# Patient Record
Sex: FEMALE | Race: WHITE | Employment: OTHER | URBAN - METROPOLITAN AREA
[De-identification: names, ages, dates, MRNs, and addresses within clinical notes are randomized per-mention and may not be internally consistent; named-entity substitution may affect disease eponyms.]

---

## 2018-11-26 ENCOUNTER — HOSPITAL ENCOUNTER (OUTPATIENT)
Dept: WOMENS IMAGING | Age: 63
Discharge: HOME | End: 2018-11-28
Payer: MEDICARE

## 2018-11-26 DIAGNOSIS — Z12.31 ENCOUNTER FOR SCREENING MAMMOGRAM FOR BREAST CANCER: ICD-10-CM

## 2018-11-26 PROCEDURE — 77067 SCR MAMMO BI INCL CAD: CPT

## 2020-03-19 ENCOUNTER — HOSPITAL ENCOUNTER (OUTPATIENT)
Dept: WOMENS IMAGING | Age: 65
Discharge: HOME | End: 2020-03-21
Payer: MEDICARE

## 2020-03-19 PROCEDURE — 77063 BREAST TOMOSYNTHESIS BI: CPT

## 2020-10-16 ENCOUNTER — HOSPITAL ENCOUNTER (OUTPATIENT)
Dept: GENERAL RADIOLOGY | Age: 65
Discharge: HOME | End: 2020-10-18
Payer: MEDICARE

## 2020-10-16 PROCEDURE — 72050 X-RAY EXAM NECK SPINE 4/5VWS: CPT

## 2021-08-10 ENCOUNTER — HOSPITAL ENCOUNTER (OUTPATIENT)
Dept: WOMENS IMAGING | Age: 66
Discharge: HOME | End: 2021-08-12
Payer: MEDICARE

## 2021-08-10 DIAGNOSIS — Z78.0 MENOPAUSE: ICD-10-CM

## 2021-08-10 DIAGNOSIS — Z12.31 ENCOUNTER FOR SCREENING MAMMOGRAM FOR BREAST CANCER: ICD-10-CM

## 2021-08-10 DIAGNOSIS — Z13.820 SCREENING FOR OSTEOPOROSIS: ICD-10-CM

## 2021-08-10 PROCEDURE — 77067 SCR MAMMO BI INCL CAD: CPT

## 2021-08-10 PROCEDURE — 77080 DXA BONE DENSITY AXIAL: CPT

## 2022-09-14 ENCOUNTER — HOSPITAL ENCOUNTER (OUTPATIENT)
Dept: GENERAL RADIOLOGY | Age: 67
Discharge: HOME | End: 2022-09-16
Payer: MEDICARE

## 2022-09-14 DIAGNOSIS — M54.6 THORACIC SPINE PAIN: ICD-10-CM

## 2022-09-14 DIAGNOSIS — M54.2 NECK PAIN: ICD-10-CM

## 2022-09-14 PROCEDURE — 72050 X-RAY EXAM NECK SPINE 4/5VWS: CPT

## 2022-09-14 PROCEDURE — 72072 X-RAY EXAM THORAC SPINE 3VWS: CPT

## 2023-03-16 ENCOUNTER — INITIAL CONSULT (OUTPATIENT)
Dept: PAIN MANAGEMENT | Age: 68
End: 2023-03-16
Payer: MEDICARE

## 2023-03-16 VITALS
TEMPERATURE: 96.9 F | DIASTOLIC BLOOD PRESSURE: 84 MMHG | HEIGHT: 65 IN | SYSTOLIC BLOOD PRESSURE: 132 MMHG | WEIGHT: 239 LBS | BODY MASS INDEX: 39.82 KG/M2

## 2023-03-16 DIAGNOSIS — M54.12 CERVICAL RADICULOPATHY: Primary | ICD-10-CM

## 2023-03-16 PROCEDURE — 99203 OFFICE O/P NEW LOW 30 MIN: CPT | Performed by: PAIN MEDICINE

## 2023-03-16 PROCEDURE — 1123F ACP DISCUSS/DSCN MKR DOCD: CPT | Performed by: PAIN MEDICINE

## 2023-03-16 NOTE — PROGRESS NOTES
[facility]) Physicians  Neurosurgery and Pain [facility]  [address] [facility], [address] [facility] , [address], [place]: [phone]  F: [phone]        Crescencio Liriano  ([birth year])    3/16/2023    Subjective:     Crescencio Liriano is 78 y.o. female who complains today of:     Chief Complaint   Patient presents with    Neck Pain    Back Pain    Consultation     Patient here today for initial evaluation. She saw Izabel Jarquin physician assistant for Dr. Gunner Maldonado. Currently she is not a surgical candidate. Her MRI from January of this year shows mild multilevel cervical spondylosis most pronounced at C5-6 C6-7. She gets neck pain and sometimes pain down the arms with numbness tingling. She is on Plavix aspirin. No neck surgery. She is diabetic. Currently does not work to physical jobs. Had physical therapy. She quit smoking in 2006. Turning twisting bothers her. The pain can be achy or sharp and varies. Plan: She has failed conservative treatment. We will put in for a cervical epidural to help her neck pain and radicular pain. We will try to get permission from her primary care provider to be off Plavix and aspirin 1 week prior to procedure. She apparently was put on Plavix many years ago by her neurologist.  She currently does not have a neurologist.  She also had a heart attack in 2006 that is when she quit smoking. I showed her an anatomic model pathology. She understands the plan is in agreement. Questions answered chart was reviewed. Allergies:  Patient has no allergy information on record. No past medical history on file. No past surgical history on file. No family history on file.   Social History     Socioeconomic History    Marital status:      Spouse name: Not on file    Number of children: Not on file    Years of education: Not on file    Highest education level: Not on file   Occupational History    Not on file   Tobacco Use    Smoking status: Not on file    Smokeless tobacco: Not on file   Substance and Sexual Activity    Alcohol use: Not on file    Drug use: Not on file    Sexual activity: Not on file   Other Topics Concern    Not on file   Social History Narrative    Not on file     Social Determinants of Health     Financial Resource Strain: Not on file   Food Insecurity: Not on file   Transportation Needs: Not on file   Physical Activity: Not on file   Stress: Not on file   Social Connections: Not on file   Intimate Partner Violence: Not on file   Housing Stability: Not on file       No current outpatient medications on file prior to visit. No current facility-administered medications on file prior to visit. HPI    Review of Systems   All other systems reviewed and are negative. Objective:     Vitals:  /84 (Site: Left Upper Arm)   Temp 96.9 °F (36.1 °C) (Temporal)   Ht 5' 5\" (1.651 m)   Wt 239 lb (108.4 kg)   BMI 39.77 kg/m² Pain Score:  10 - Worst pain ever      Physical Exam  Patient is AO X 3 , recent and remote memory is intact,mood and affect normal,judgement and insight normal. Head normacephalic,eyes - PERRLA, EOMI, No obvious external Ears, Nose, mouth masses seen, neck supple, trachae midline, no abnormal lymph nodes palpated in neck or supraclavicular region. CN's 2-12 grossly intact. Strength functional for ambulation, balance functional, coordination normal. Visualized skin intact, no obvious lesion or visualized cyanosis. No swelling. Pulses intact. No obvious upper motor neuron signs. Sensation grossly intact to light touch. Strength functional upper extremities. Positive Spurling's maneuver decreased range of motion cervical spine      Assessment:      Diagnosis Orders   1.  Cervical radiculopathy  MO NJX DX/THER SBST INTRLMNR CRV/THRC W/IMG GDN          Plan:

## 2023-03-24 ENCOUNTER — TELEPHONE (OUTPATIENT)
Dept: PAIN MANAGEMENT | Age: 68
End: 2023-03-24

## 2023-03-24 NOTE — TELEPHONE ENCOUNTER
Received permission from Dr Thierry Serrano to hold Plavx and ASA for  7 days prior to cervical epidural. Waiting for insurance approval. Order printed and put in precert bin.

## 2023-03-29 ENCOUNTER — TELEPHONE (OUTPATIENT)
Dept: PAIN MANAGEMENT | Age: 68
End: 2023-03-29

## 2023-03-29 NOTE — TELEPHONE ENCOUNTER
PLEASE ASK DR. Stacy Oneal WHO HE WOULD LIKE TO PERFORM THIS CERVICAL PRIYANKA? DR Naveed Landin?     WE NEED TO KNOW WHICH DR TO  SUBMIT FOR AUTHORIZATION

## 2023-03-30 ENCOUNTER — TELEPHONE (OUTPATIENT)
Dept: PAIN MANAGEMENT | Age: 68
End: 2023-03-30

## 2023-03-30 NOTE — TELEPHONE ENCOUNTER
REFERRAL NUMBER [id number]    EDILMA    AUTH FROM 4/4/23-9/26/23    OK to schedule procedure approved as above. Please note sides/levels approved and date range. (If applicable, sides/levels approved may differ from those ordered)    TO BE SCHEDULED WITH DR. Delacruz Running

## 2023-03-30 NOTE — TELEPHONE ENCOUNTER
(SM- CERVICAL PRIYANKA AUTH -- 4/4/23-9/26/23)    Patient is on Plavix and ASA , request send to pcp Dr aPntera Tillman, request send on 3/24 ok to schedule.  Patient is schedule

## 2023-05-04 ENCOUNTER — PROCEDURE VISIT (OUTPATIENT)
Dept: PAIN MANAGEMENT | Age: 68
End: 2023-05-04

## 2023-05-04 DIAGNOSIS — M54.12 CERVICAL RADICULOPATHY: Primary | ICD-10-CM

## 2023-05-04 RX ORDER — LIDOCAINE HYDROCHLORIDE 10 MG/ML
5 INJECTION, SOLUTION INFILTRATION; PERINEURAL ONCE
Status: COMPLETED | OUTPATIENT
Start: 2023-05-04 | End: 2023-05-04

## 2023-05-04 RX ORDER — DEXAMETHASONE SODIUM PHOSPHATE 10 MG/ML
10 INJECTION, SOLUTION INTRAMUSCULAR; INTRAVENOUS ONCE
Status: COMPLETED | OUTPATIENT
Start: 2023-05-04 | End: 2023-05-04

## 2023-05-04 RX ORDER — SODIUM CHLORIDE 9 MG/ML
3 INJECTION INTRAVENOUS ONCE
Status: COMPLETED | OUTPATIENT
Start: 2023-05-04 | End: 2023-05-04

## 2023-05-04 RX ADMIN — Medication 0.5 MEQ: at 11:43

## 2023-05-04 RX ADMIN — SODIUM CHLORIDE 3 ML: 9 INJECTION INTRAVENOUS at 11:44

## 2023-05-04 RX ADMIN — LIDOCAINE HYDROCHLORIDE 5 ML: 10 INJECTION, SOLUTION INFILTRATION; PERINEURAL at 11:43

## 2023-05-04 RX ADMIN — DEXAMETHASONE SODIUM PHOSPHATE 10 MG: 10 INJECTION, SOLUTION INTRAMUSCULAR; INTRAVENOUS at 11:42

## 2023-05-04 NOTE — PROGRESS NOTES
Cervical Interlaminar Epidural Corticosteroid Injection (ILESI) under Fluoroscopic Guidance         Patient Name: Dimas Ramirez  :   Date: 2023   Provider: Francois Segura MD    PROCEDURE:  Cervical interlaminar epidural corticosteroid injection (ILESI) at the C7/T1 level under fluoroscopic guidance    INDICATIONS: Dimas Ramirez is a 78 y.o. female who presents with symptoms and physical exam findings consistent with cervical radiculopathy. She has cervical paresthesias with a positive Spurling's maneuver on physical exam. She has had persistent pain that limits her activities of daily living such as upper body dressing. The pain is persistent despite conservative measures including home exercise program. She is unable to take anti-inflammatories due to comorbidities. Given her symptoms, physical exam findings, impairment in activities of daily living, and lack of response to conservative measures, consideration for C7/T1 Cervical interlaminar epidural corticosteroid injection under fluoroscopic guidance was given. Discussed the risks including but not limited to bleeding, infection, worsened pain, damage to surrounding structures, side effects, toxicity, allergic reactions to medications used, need for surgery, headache, vision changes, difficulty with chewing and/or swallowing, pneumothorax, immune and stress-response dysfunction, fat necrosis, avascular necrosis, skin pigmentation changes, blood sugar elevation, as well as catastrophic injury such as vision loss/blindness, paralysis, spinal cord or plexus injury, cerebral brainstem or spinal cord infarction, intrathecal injection, spinal cord puncture, persistent dural leak, arachnoiditis, stroke, bowel/bladder/sexual dysfunction, ventilator dependence, loss of use of the arms and/or legs, and death. Discussed off-label use of corticosteroids and how the Food and Drug Administration (FDA) has not approved corticosteroids for epidural use.

## 2023-05-04 NOTE — PROGRESS NOTES
This procedure was 50% more difficult and required 50% more work secondary to the patient's habitus. The patient has a BMI of 39.8 and has comorbidities of diabetes mellitus and osteoarthritis. This required increased work for safe and proper positioning upon the fluoroscopy table, increased needle passes for safe and appropriate needle placement, and increased fluoroscopy time and radiation exposure for proper visualization.

## 2023-05-26 LAB
BACTERIA, URINE: ABNORMAL /HPF
BUDDING YEAST, URINE: PRESENT /HPF
HYALINE CASTS, URINE: ABNORMAL /LPF
RBC, URINE: 10 /HPF (ref 0–5)
SQUAMOUS EPITHELIAL CELLS, URINE: 3 /HPF
WBC CLUMPS, URINE: ABNORMAL /HPF
WBC, URINE: 36 /HPF (ref 0–5)

## 2023-05-27 LAB — URINE CULTURE: NORMAL

## 2023-06-07 ENCOUNTER — OFFICE VISIT (OUTPATIENT)
Dept: PAIN MANAGEMENT | Age: 68
End: 2023-06-07
Payer: MEDICARE

## 2023-06-07 VITALS
SYSTOLIC BLOOD PRESSURE: 126 MMHG | BODY MASS INDEX: 39.82 KG/M2 | DIASTOLIC BLOOD PRESSURE: 74 MMHG | HEIGHT: 65 IN | TEMPERATURE: 96.4 F | WEIGHT: 239 LBS

## 2023-06-07 DIAGNOSIS — M25.561 CHRONIC PAIN OF BOTH KNEES: Primary | ICD-10-CM

## 2023-06-07 DIAGNOSIS — M25.552 BILATERAL HIP PAIN: ICD-10-CM

## 2023-06-07 DIAGNOSIS — G89.29 CHRONIC PAIN OF BOTH KNEES: Primary | ICD-10-CM

## 2023-06-07 DIAGNOSIS — M25.551 BILATERAL HIP PAIN: ICD-10-CM

## 2023-06-07 DIAGNOSIS — M54.12 CERVICAL RADICULOPATHY: ICD-10-CM

## 2023-06-07 DIAGNOSIS — M25.562 CHRONIC PAIN OF BOTH KNEES: Primary | ICD-10-CM

## 2023-06-07 PROCEDURE — 1123F ACP DISCUSS/DSCN MKR DOCD: CPT | Performed by: NURSE PRACTITIONER

## 2023-06-07 PROCEDURE — 99214 OFFICE O/P EST MOD 30 MIN: CPT | Performed by: NURSE PRACTITIONER

## 2023-06-07 RX ORDER — CLOPIDOGREL BISULFATE 75 MG/1
75 TABLET ORAL DAILY
COMMUNITY
Start: 2023-03-31

## 2023-06-07 RX ORDER — OMEPRAZOLE MAGNESIUM 10 MG/1
GRANULE, DELAYED RELEASE ORAL
COMMUNITY

## 2023-06-07 RX ORDER — FLUTICASONE PROPIONATE AND SALMETEROL 500; 50 UG/1; UG/1
1 POWDER RESPIRATORY (INHALATION) 2 TIMES DAILY
COMMUNITY
Start: 2022-05-13

## 2023-06-07 RX ORDER — CLOPIDOGREL BISULFATE 75 MG/1
TABLET ORAL
COMMUNITY
Start: 2022-09-24

## 2023-06-07 RX ORDER — GLIMEPIRIDE 2 MG/1
TABLET ORAL
COMMUNITY
Start: 2023-04-15

## 2023-06-07 RX ORDER — B-COMPLEX WITH VITAMIN C
TABLET ORAL
COMMUNITY

## 2023-06-07 RX ORDER — CALCIUM CARBONATE/VITAMIN D3 600 MG-10
TABLET ORAL
COMMUNITY

## 2023-06-07 RX ORDER — ALLOPURINOL 100 MG/1
TABLET ORAL
COMMUNITY
Start: 2023-06-05

## 2023-06-07 RX ORDER — ALPRAZOLAM 0.5 MG/1
TABLET ORAL
COMMUNITY
Start: 2023-03-10

## 2023-06-07 RX ORDER — DICYCLOMINE HCL 20 MG
TABLET ORAL
COMMUNITY
Start: 2020-12-15

## 2023-06-07 RX ORDER — GABAPENTIN 300 MG/1
CAPSULE ORAL
COMMUNITY
Start: 2023-05-16

## 2023-06-07 RX ORDER — LEVOFLOXACIN 500 MG/1
500 TABLET, FILM COATED ORAL DAILY
COMMUNITY
Start: 2023-05-25

## 2023-06-07 ASSESSMENT — ENCOUNTER SYMPTOMS
DIARRHEA: 0
RESPIRATORY NEGATIVE: 1
CONSTIPATION: 0
BACK PAIN: 0

## 2023-06-08 ENCOUNTER — HOSPITAL ENCOUNTER (OUTPATIENT)
Dept: GENERAL RADIOLOGY | Age: 68
Discharge: HOME | End: 2023-06-10
Payer: MEDICARE

## 2023-06-08 DIAGNOSIS — M25.561 CHRONIC PAIN OF BOTH KNEES: ICD-10-CM

## 2023-06-08 DIAGNOSIS — M25.562 CHRONIC PAIN OF BOTH KNEES: ICD-10-CM

## 2023-06-08 DIAGNOSIS — G89.29 CHRONIC PAIN OF BOTH KNEES: ICD-10-CM

## 2023-06-08 DIAGNOSIS — M25.552 BILATERAL HIP PAIN: ICD-10-CM

## 2023-06-08 DIAGNOSIS — M25.551 BILATERAL HIP PAIN: ICD-10-CM

## 2023-06-08 PROCEDURE — 73502 X-RAY EXAM HIP UNI 2-3 VIEWS: CPT

## 2023-06-08 PROCEDURE — 73560 X-RAY EXAM OF KNEE 1 OR 2: CPT

## 2023-06-09 ENCOUNTER — INITIAL CONSULT (OUTPATIENT)
Dept: SPORTS MEDICINE | Age: 68
End: 2023-06-09

## 2023-06-09 VITALS
HEIGHT: 65 IN | WEIGHT: 234 LBS | SYSTOLIC BLOOD PRESSURE: 128 MMHG | DIASTOLIC BLOOD PRESSURE: 82 MMHG | TEMPERATURE: 97.8 F | BODY MASS INDEX: 38.99 KG/M2

## 2023-06-09 DIAGNOSIS — M25.859 FEMORAL ACETABULAR IMPINGEMENT: ICD-10-CM

## 2023-06-09 DIAGNOSIS — M17.0 PRIMARY OSTEOARTHRITIS OF BOTH KNEES: Primary | ICD-10-CM

## 2023-06-09 RX ORDER — LIDOCAINE HYDROCHLORIDE 10 MG/ML
6 INJECTION, SOLUTION INFILTRATION; PERINEURAL ONCE
Status: COMPLETED | OUTPATIENT
Start: 2023-06-09 | End: 2023-06-09

## 2023-06-09 RX ORDER — BETAMETHASONE SODIUM PHOSPHATE AND BETAMETHASONE ACETATE 3; 3 MG/ML; MG/ML
24 INJECTION, SUSPENSION INTRA-ARTICULAR; INTRALESIONAL; INTRAMUSCULAR; SOFT TISSUE ONCE
Status: COMPLETED | OUTPATIENT
Start: 2023-06-09 | End: 2023-06-09

## 2023-06-09 RX ORDER — BUPIVACAINE HYDROCHLORIDE 2.5 MG/ML
6 INJECTION, SOLUTION INFILTRATION; PERINEURAL ONCE
Status: COMPLETED | OUTPATIENT
Start: 2023-06-09 | End: 2023-06-09

## 2023-06-09 RX ADMIN — LIDOCAINE HYDROCHLORIDE 6 ML: 10 INJECTION, SOLUTION INFILTRATION; PERINEURAL at 11:54

## 2023-06-09 RX ADMIN — BUPIVACAINE HYDROCHLORIDE 15 MG: 2.5 INJECTION, SOLUTION INFILTRATION; PERINEURAL at 11:54

## 2023-06-09 RX ADMIN — BETAMETHASONE SODIUM PHOSPHATE AND BETAMETHASONE ACETATE 24 MG: 3; 3 INJECTION, SUSPENSION INTRA-ARTICULAR; INTRALESIONAL; INTRAMUSCULAR; SOFT TISSUE at 11:54

## 2023-06-09 ASSESSMENT — ENCOUNTER SYMPTOMS
DIARRHEA: 0
SHORTNESS OF BREATH: 0
BACK PAIN: 0
CONSTIPATION: 0
NAUSEA: 0

## 2023-06-09 NOTE — PROGRESS NOTES
[facility]) Physicians  Neurosurgery and Pain [facility]  [address] [facility] [facility], [address] [facility] , [address] [facility], [place]: [phone]  F: [phone]      Frank Whitten  ([birth year])    6/9/2023    Subjective:     Frank Whitten is 78 y.o. female who complains today of:    Chief Complaint   Patient presents with    Knee Pain    Hip Pain       HPI     This patient comes in in consultation for bilateral knee and hip pain she has some left side hurts worse than the right since the left was so bad at times she had to get a surgery done about 10 years ago for meniscal issue which says the pain continues to bother her she has had no recent treatments for the issue is here today for further evaluation and treatment  Allergies:  Atorvastatin, Erythromycin, Hydroxychloroquine, Penicillins, Amlodipine besylate, and Glimepiride    History reviewed. No pertinent past medical history. History reviewed. No pertinent surgical history. History reviewed. No pertinent family history.   Social History     Socioeconomic History    Marital status:      Spouse name: Not on file    Number of children: Not on file    Years of education: Not on file    Highest education level: Not on file   Occupational History    Not on file   Tobacco Use    Smoking status: Never    Smokeless tobacco: Never   Substance and Sexual Activity    Alcohol use: Never    Drug use: Never    Sexual activity: Not on file   Other Topics Concern    Not on file   Social History Narrative    Not on file     Social Determinants of Health     Financial Resource Strain: Not on file   Food Insecurity: Not on file   Transportation Needs: Not on file   Physical Activity: Not on file   Stress: Not on file   Social Connections: Not on file   Intimate Partner Violence: Not on file   Housing Stability: Not on file       Current Outpatient Medications on File Prior to Visit   Medication Sig Dispense Refill    allopurinol (ZYLOPRIM) 100 MG tablet

## 2023-07-03 ENCOUNTER — OFFICE VISIT (OUTPATIENT)
Dept: SPORTS MEDICINE | Age: 68
End: 2023-07-03
Payer: MEDICARE

## 2023-07-03 VITALS — WEIGHT: 236 LBS | TEMPERATURE: 97.6 F | HEIGHT: 65 IN | BODY MASS INDEX: 39.32 KG/M2

## 2023-07-03 DIAGNOSIS — M17.0 PRIMARY OSTEOARTHRITIS OF BOTH KNEES: Primary | ICD-10-CM

## 2023-07-03 DIAGNOSIS — M25.859 FEMORAL ACETABULAR IMPINGEMENT: ICD-10-CM

## 2023-07-03 PROCEDURE — 1123F ACP DISCUSS/DSCN MKR DOCD: CPT | Performed by: FAMILY MEDICINE

## 2023-07-03 PROCEDURE — 99213 OFFICE O/P EST LOW 20 MIN: CPT | Performed by: FAMILY MEDICINE

## 2023-07-03 ASSESSMENT — ENCOUNTER SYMPTOMS
SHORTNESS OF BREATH: 0
CONSTIPATION: 0
NAUSEA: 0
BACK PAIN: 0
DIARRHEA: 0

## 2023-07-03 NOTE — PROGRESS NOTES
[facility] ([facility]) Physicians  Neurosurgery and Pain [address] , [address] [facility], [address] [place] [address] [place]: [phone]  F: [phone]      Cathy Moulton  ([birth year])    7/3/2023    Subjective:     Cathy Moulton is 78 y.o. female who complains today of:    Chief Complaint   Patient presents with    Knee Pain       HPI     This patient returns stating that she is feeling a lot better since injection still has some pain in both knees she is started on her exercise at home and also getting set to go to formalized therapy hips are also feeling better  Allergies:  Atorvastatin, Erythromycin, Hydroxychloroquine, Penicillins, Amlodipine besylate, and Glimepiride    History reviewed. No pertinent past medical history. History reviewed. No pertinent surgical history. History reviewed. No pertinent family history.   Social History     Socioeconomic History    Marital status:      Spouse name: Not on file    Number of children: Not on file    Years of education: Not on file    Highest education level: Not on file   Occupational History    Not on file   Tobacco Use    Smoking status: Never    Smokeless tobacco: Never   Substance and Sexual Activity    Alcohol use: Never    Drug use: Never    Sexual activity: Not on file   Other Topics Concern    Not on file   Social History Narrative    Not on file     Social Determinants of Health     Financial Resource Strain: Not on file   Food Insecurity: Not on file   Transportation Needs: Not on file   Physical Activity: Not on file   Stress: Not on file   Social Connections: Not on file   Intimate Partner Violence: Not on file   Housing Stability: Not on file       Current Outpatient Medications on File Prior to Visit   Medication Sig Dispense Refill    allopurinol (ZYLOPRIM) 100 MG tablet       ALPRAZolam (XANAX) 0.5 MG tablet Take by mouth.      calcium carb-cholecalciferol 600-10 MG-MCG TABS per tab       clopidogrel (PLAVIX) 75 MG

## 2023-07-24 ENCOUNTER — OFFICE VISIT (OUTPATIENT)
Dept: SPORTS MEDICINE | Age: 68
End: 2023-07-24
Payer: MEDICARE

## 2023-07-24 VITALS — TEMPERATURE: 96.7 F | BODY MASS INDEX: 39.32 KG/M2 | WEIGHT: 236 LBS | HEIGHT: 65 IN

## 2023-07-24 DIAGNOSIS — M17.0 PRIMARY OSTEOARTHRITIS OF BOTH KNEES: Primary | ICD-10-CM

## 2023-07-24 DIAGNOSIS — M25.859 FEMORAL ACETABULAR IMPINGEMENT: ICD-10-CM

## 2023-07-24 PROCEDURE — 99213 OFFICE O/P EST LOW 20 MIN: CPT | Performed by: FAMILY MEDICINE

## 2023-07-24 PROCEDURE — 1123F ACP DISCUSS/DSCN MKR DOCD: CPT | Performed by: FAMILY MEDICINE

## 2023-07-24 ASSESSMENT — ENCOUNTER SYMPTOMS
BACK PAIN: 0
CONSTIPATION: 0
NAUSEA: 0
DIARRHEA: 0
SHORTNESS OF BREATH: 0

## 2023-07-24 NOTE — PROGRESS NOTES
motion is 0-90 tenderness mostly of the medial joint spaces no edema or effusion    Assessment:      Diagnosis Orders   1. Primary osteoarthritis of both knees        2. Femoral acetabular impingement            Plan:   Regarding the hips I would have her try some physical therapy see if it helps if not we will consider an injection  Regarding the knees we are waiting for permission for Durolane and we will inject once we get permission       No orders of the defined types were placed in this encounter. No orders of the defined types were placed in this encounter. Follow up:  Return for gel injections.     ROBER BHATIA DO

## 2023-07-26 ENCOUNTER — TELEPHONE (OUTPATIENT)
Dept: SPORTS MEDICINE | Age: 68
End: 2023-07-26

## 2023-07-26 NOTE — TELEPHONE ENCOUNTER
PLEASE ADVISE PATIENT THAT AN ORDER WAS RECEIVED FOR A BILAT KNEE DUROLANE INJECTION. PATIENT WILL NEED A COURSE OF PT NEEDED BEFORE WE CAN SUBMIT FOR AUTH.

## 2023-07-27 NOTE — TELEPHONE ENCOUNTER
CALLED [facility] AND SPOKE WITH BILL BELTRAN STATES THE LAST TIME PATIENT WAS THERE WAS BACK IN JAN AND FEB OF THIS YEAR. NOTHING IN June WHEN DR. BHATIA ORDERED PT FOR HER KNEES.

## 2023-09-08 ENCOUNTER — TELEPHONE (OUTPATIENT)
Dept: PAIN MANAGEMENT | Age: 68
End: 2023-09-08

## 2023-09-08 NOTE — TELEPHONE ENCOUNTER
[facility] called requesting the PT order updated. It's usually good for 60days but it's pass that and pt has an appt next Mon. 9/11.

## 2023-09-11 ENCOUNTER — HOSPITAL ENCOUNTER (OUTPATIENT)
Dept: PHYSICAL THERAPY | Age: 68
Setting detail: THERAPIES SERIES
Discharge: HOME | End: 2023-09-11
Payer: MEDICARE

## 2023-09-11 PROCEDURE — 97162 PT EVAL MOD COMPLEX 30 MIN: CPT

## 2023-09-11 PROCEDURE — 97140 MANUAL THERAPY 1/> REGIONS: CPT

## 2023-09-11 ASSESSMENT — PAIN SCALES - GENERAL: PAINLEVEL_OUTOF10: 9

## 2023-09-11 ASSESSMENT — PAIN DESCRIPTION - LOCATION: LOCATION: KNEE

## 2023-09-11 ASSESSMENT — PAIN DESCRIPTION - DESCRIPTORS: DESCRIPTORS: ACHING;THROBBING

## 2023-09-11 ASSESSMENT — PAIN DESCRIPTION - ORIENTATION: ORIENTATION: RIGHT;LEFT;ANTERIOR

## 2023-09-11 NOTE — PROGRESS NOTES
[address]   [address],[facility] [address]   [place], [address]  Phone: [phone]                             Physical Therapy: Initial Evaluation    Patient: Panchito Montana (84 y.o.     female)   Examination Date: 2023   :  1955 ;    Confirmed: Yes MRN: [id number]  CSN: [id number]   Insurance: Payor: Dede Haddad / Plan: Nancy Manriquez PPO / Product Type: Medicare /   Insurance ID: [id number] - (Medicare Managed) PT Insurance Information: Aetna Medicare Secondary Insurance (if applicable):    Referring Physician: Anastasiia Oneill DO      PCP: Consuelo Perry MD Visits to Date/Visits Approved:   (BMN, $20 copay)    No Show/Cancelled Appts: 0 / 0     Medical Diagnosis: Bilateral primary osteoarthritis of knee [M17.0]  Other specified joint disorders, unspecified hip [M25.859]    Treatment Diagnosis: B knee pain with decreased ROM, strength affecting overall functional tolerance. PERTINENT MEDICAL HISTORY   Patient Assessed for Rehabilitation Services: Yes       Medical History: Chart Reviewed: Yes No past medical history on file. Surgical History: No past surgical history on file.     Medications:   Current Outpatient Medications:     allopurinol (ZYLOPRIM) 100 MG tablet, , Disp: , Rfl:     ALPRAZolam (XANAX) 0.5 MG tablet, Take by mouth., Disp: , Rfl:     calcium carb-cholecalciferol 600-10 MG-MCG TABS per tab, , Disp: , Rfl:     clopidogrel (PLAVIX) 75 MG tablet, , Disp: , Rfl:     Calcium Carbonate-Vitamin D 600-5 MG-MCG TABS, Take by mouth, Disp: , Rfl:     clopidogrel (PLAVIX) 75 MG tablet, Take 1 tablet by mouth daily, Disp: , Rfl:     dicyclomine (BENTYL) 20 MG tablet, Take by mouth, Disp: , Rfl:     fluticasone-salmeterol (ADVAIR) 500-50 MCG/ACT AEPB diskus inhaler, Inhale 1 puff into the lungs 2 times daily, Disp: , Rfl:     gabapentin (NEURONTIN) 300 MG capsule, TAKE 2 CAPSULES BY MOUTH 3 TIMES A DAY, Disp: , Rfl:     glimepiride (AMARYL) 2 MG

## 2023-09-15 ENCOUNTER — OFFICE VISIT (OUTPATIENT)
Dept: SPORTS MEDICINE | Age: 68
End: 2023-09-15
Payer: MEDICARE

## 2023-09-15 ENCOUNTER — HOSPITAL ENCOUNTER (OUTPATIENT)
Dept: PHYSICAL THERAPY | Age: 68
Setting detail: THERAPIES SERIES
Discharge: HOME | End: 2023-09-15
Payer: MEDICARE

## 2023-09-15 VITALS — HEIGHT: 65 IN | WEIGHT: 236 LBS | BODY MASS INDEX: 39.32 KG/M2 | TEMPERATURE: 96.7 F

## 2023-09-15 DIAGNOSIS — M25.859 FEMORAL ACETABULAR IMPINGEMENT: ICD-10-CM

## 2023-09-15 DIAGNOSIS — M17.0 PRIMARY OSTEOARTHRITIS OF BOTH KNEES: Primary | ICD-10-CM

## 2023-09-15 PROCEDURE — G0283 ELEC STIM OTHER THAN WOUND: HCPCS

## 2023-09-15 PROCEDURE — 97110 THERAPEUTIC EXERCISES: CPT

## 2023-09-15 PROCEDURE — 1123F ACP DISCUSS/DSCN MKR DOCD: CPT | Performed by: FAMILY MEDICINE

## 2023-09-15 PROCEDURE — 99213 OFFICE O/P EST LOW 20 MIN: CPT | Performed by: FAMILY MEDICINE

## 2023-09-15 ASSESSMENT — PAIN DESCRIPTION - LOCATION: LOCATION: KNEE

## 2023-09-15 ASSESSMENT — PAIN SCALES - GENERAL: PAINLEVEL_OUTOF10: 4

## 2023-09-15 ASSESSMENT — PAIN DESCRIPTION - ORIENTATION: ORIENTATION: RIGHT;LEFT

## 2023-09-15 ASSESSMENT — ENCOUNTER SYMPTOMS
NAUSEA: 0
SHORTNESS OF BREATH: 0
CONSTIPATION: 0
BACK PAIN: 0
DIARRHEA: 0

## 2023-09-15 ASSESSMENT — PAIN DESCRIPTION - DESCRIPTORS: DESCRIPTORS: ACHING;THROBBING

## 2023-09-15 NOTE — PROGRESS NOTES
[address] [facility], [address]  VVW:[phone]      Physical TherapyTreatment Note        Date: 9/15/2023  Patient: Alin Matt  : 1955   Confirmed: Yes  MRN: [id number]  Referring Provider: Renetta Mendez DO      Medical Diagnosis: Bilateral primary osteoarthritis of knee [M17.0]  Other specified joint disorders, unspecified hip [M25.859]      Treatment Diagnosis: B knee pain with decreased ROM, strength affecting overall functional tolerance. Visit Information:  Insurance: Payor: Nahid Leone / Plan: Criss Can PPO / Product Type: Medicare /   PT Visit Information  Onset Date: 22 (one year worsening of arthritic knee pain L>R knee)  PT Insurance Information: Aetna Medicare  Total # of Visits Approved:  (BMN, $20 copay)  Total # of Visits to Date: 2  Plan of Care/Certification Expiration Date: 23  No Show: 0  Progress Note Due Date: 10/11/23  Canceled Appointment: 0  Progress Note Counter:  (PN due 10/11/23)    Subjective Information:  Subjective: Pt continues to use st cane. States was sore after evaluation testing the other day. HEP Compliance:  [] Good [] Fair [] Poor [x] Reports forgetting to follow up about aquatics and pt removed taping due to ineffective at controlling pain. Pain Screening  Pain Level: 4  Pain Location: Knee  Pain Orientation: Right, Left  Pain Descriptors: Aching, Throbbing    Treatment:  Exercises:  Exercises  Exercise 1: QS 5 s x 10, ham jeff, hip IR 5s x 10 with ball @ankles/ER 5s x 10 with belt  Exercise 2: hip add 10s x 12/abd GTB x 12  Exercise 3: ham stretch*  Exercise 4: supine SLR x 10 R and x7 L, hip abd*, circles*, heel slides x 10 B  Exercise 5: bridge x 10, 3 sec hold  Exercise 15: HEP:QS, heel slide, SLR, hip add, abd with green band, bridge  Treatment Reasoning  Limitations addressed: Mobility, Strength  Functional ability(s) targeted:  Tolerance to age appropriate

## 2023-09-18 ENCOUNTER — HOSPITAL ENCOUNTER (OUTPATIENT)
Dept: PHYSICAL THERAPY | Age: 68
Setting detail: THERAPIES SERIES
Discharge: HOME | End: 2023-09-18
Payer: MEDICARE

## 2023-09-18 ENCOUNTER — TELEPHONE (OUTPATIENT)
Dept: PAIN MANAGEMENT | Age: 68
End: 2023-09-18

## 2023-09-18 PROCEDURE — 97110 THERAPEUTIC EXERCISES: CPT

## 2023-09-18 PROCEDURE — G0283 ELEC STIM OTHER THAN WOUND: HCPCS

## 2023-09-18 ASSESSMENT — PAIN DESCRIPTION - LOCATION: LOCATION: LEG

## 2023-09-18 ASSESSMENT — PAIN DESCRIPTION - ORIENTATION: ORIENTATION: RIGHT;LEFT

## 2023-09-18 ASSESSMENT — PAIN SCALES - GENERAL: PAINLEVEL_OUTOF10: 5

## 2023-09-18 ASSESSMENT — PAIN DESCRIPTION - DESCRIPTORS: DESCRIPTORS: ACHING

## 2023-09-18 NOTE — PROGRESS NOTES
exercise program, Gait training, Stair training, Neuromuscular re-education, Balance training, Functional mobility training, Manual, Modalities  Modalities: Heat/Cold, E-stim - unattended, Ultrasound  Pt to continue current HEP. See objective section for any therapeutic exercise changes, additions or modifications this date.     Therapy Time:      PT Individual Minutes  Time In: 4653  Time Out: 4450  Minutes: 53  Timed Code Treatment Minutes: 43 Minutes  Procedure Minutes:10 min Estim   Timed Activity Minutes Units   Ther Ex 43 3     Electronically signed by Rachel Aaron PTA on 9/18/23 at 1:09 PM EDT

## 2023-09-18 NOTE — TELEPHONE ENCOUNTER
Prior authorization has been started on Cover My Meds, for Lidocaine 5% ointment   Clinical uploaded, authorization pending Key: [id number] - PA Case ID: [id number]

## 2023-09-19 NOTE — TELEPHONE ENCOUNTER
Patient's insurance has denied coverage of Lidocaine 4% ointment for the following reason(s):    -We denied this request under Medicare Part D because: Drugs available without a prescription and over the counter are excluded from coverage under your Medicare Part D drug plan. Your Medicare Part D drug plan was asked to cover the requested drug. The requested drug is available without a prescription and available over the counter    How would Dr. Gala Nuñez like to proceed?   Please respond to Pain Clinical Pool

## 2023-09-20 ENCOUNTER — HOSPITAL ENCOUNTER (OUTPATIENT)
Dept: PHYSICAL THERAPY | Age: 68
Setting detail: THERAPIES SERIES
Discharge: HOME | End: 2023-09-20
Payer: MEDICARE

## 2023-09-20 NOTE — PROGRESS NOTES
Therapy                            Cancellation/No-show Note    Date: 2023  Patient: Lashawn Sheppard (8 y.o. female)  : 1955  MRN:  [id number]  Referring Physician: Harshil Colindres DO    Medical Diagnosis: Bilateral primary osteoarthritis of knee [M17.0]  Other specified joint disorders, unspecified hip [M25.859]      Visit Information:  Insurance: Payor: Teodora Figueroa / Plan: Kati Diaz PPO / Product Type: Medicare /   Visits to Date: 3   No Show/Cancelled Appts: 0 / 1      For today's appointment patient:  [x]  Cancelled  []  Rescheduled appointment  []  No-show   [x]  Called pt to remind of next appointment     Reason given by patient:  [x]  Patient ill  []  Conflicting appointment  []  No transportation    []  Conflict with work  []  No reason given  []  Other:      [x] Pt has future appointments scheduled, no follow up needed  [] Pt requests to be on hold.     Reason:   If > 2 weeks please discuss with therapist.  [] Therapist to call pt for follow up     Comments:       Signature: Electronically signed by Lupis Boyer PTA on 23 at 10:39 AM EDT

## 2023-09-27 ENCOUNTER — HOSPITAL ENCOUNTER (OUTPATIENT)
Dept: PHYSICAL THERAPY | Age: 68
Setting detail: THERAPIES SERIES
Discharge: HOME | End: 2023-09-27
Payer: MEDICARE

## 2023-09-27 PROCEDURE — 97110 THERAPEUTIC EXERCISES: CPT

## 2023-09-27 PROCEDURE — G0283 ELEC STIM OTHER THAN WOUND: HCPCS

## 2023-09-27 ASSESSMENT — PAIN SCALES - GENERAL: PAINLEVEL_OUTOF10: 5

## 2023-09-27 ASSESSMENT — PAIN DESCRIPTION - LOCATION: LOCATION: KNEE

## 2023-09-27 ASSESSMENT — PAIN DESCRIPTION - ORIENTATION: ORIENTATION: RIGHT;LEFT

## 2023-09-27 ASSESSMENT — PAIN DESCRIPTION - DESCRIPTORS: DESCRIPTORS: ACHING

## 2023-10-03 ENCOUNTER — HOSPITAL ENCOUNTER (OUTPATIENT)
Dept: PHYSICAL THERAPY | Age: 68
Setting detail: THERAPIES SERIES
Discharge: HOME | End: 2023-10-03
Payer: MEDICARE

## 2023-10-03 PROCEDURE — 97110 THERAPEUTIC EXERCISES: CPT

## 2023-10-03 PROCEDURE — G0283 ELEC STIM OTHER THAN WOUND: HCPCS

## 2023-10-03 ASSESSMENT — PAIN DESCRIPTION - LOCATION: LOCATION: KNEE

## 2023-10-03 ASSESSMENT — PAIN SCALES - GENERAL: PAINLEVEL_OUTOF10: 7

## 2023-10-03 ASSESSMENT — PAIN DESCRIPTION - DESCRIPTORS: DESCRIPTORS: ACHING

## 2023-10-03 ASSESSMENT — PAIN DESCRIPTION - ORIENTATION: ORIENTATION: LEFT

## 2023-10-03 NOTE — PROGRESS NOTES
[address] [facility], [address]  EIR[place]:[phone]      Physical TherapyTreatment Note        Date: 10/3/2023  Patient: Latricia Hanson  : 1955   Confirmed: Yes  MRN: [id number]  Referring Provider: Arlyn Mendoza DO      Medical Diagnosis: Bilateral primary osteoarthritis of knee [M17.0]  Other specified joint disorders, unspecified hip [M25.859]      Treatment Diagnosis: B knee pain with decreased ROM, strength affecting overall functional tolerance. Visit Information:  Insurance: Payor: Argelia Jo / Plan: Nina Gonzalez PPO / Product Type: Medicare /   PT Visit Information  Onset Date: 22 (one year worsening of arthritic knee pain L>R knee)  PT Insurance Information: Aetna Medicare  Total # of Visits Approved:  (BMN, $20 copay)  Total # of Visits to Date: 5  Plan of Care/Certification Expiration Date: 23  No Show: 1  Progress Note Due Date: 10/11/23  Canceled Appointment: 1  Progress Note Counter:  (PN due 10/11/23)    Subjective Information:  Subjective: Pt reports increased pain in L knee this date. Pt reports that it is waking her up at night.  Pt reports relief after last treatment for about an 1/2 hr.  HEP Compliance:  [x] Good [] Fair [] Poor [] Reports not doing due to:    Pain Screening  Patient Currently in Pain: Yes  Pain Assessment: 0-10  Pain Level: 7  Pain Location: Knee  Pain Orientation: Left  Pain Descriptors: Aching    Treatment:  Exercises:  Exercises  Exercise 1: QS 5s x 12, seated B hip IR iso 5s x 10 with ball / B  with belt*  Exercise 2: hip add 5s x 12 / abd GTB x 12 seated  Exercise 4: supine SLR x 10 B, hip abd*, circles*, heel slides x 15 B  Exercise 5: bridge x 10, 3 sec hold  Exercise 6: hamstring curl YTB B 10 x 3\"     Modalities:  Electric stimulation, unattended (CPT 60191) /  (Medicare)  Patient Position: Supine  E-stim location: Right, Left, Knee  E-stim type: Pre-modulated  E-stim via: 2 Electrode

## 2023-10-05 ENCOUNTER — HOSPITAL ENCOUNTER (OUTPATIENT)
Dept: PHYSICAL THERAPY | Age: 68
Setting detail: THERAPIES SERIES
Discharge: HOME | End: 2023-10-05
Payer: MEDICARE

## 2023-10-05 ENCOUNTER — OFFICE VISIT (OUTPATIENT)
Dept: SPORTS MEDICINE | Age: 68
End: 2023-10-05
Payer: MEDICARE

## 2023-10-05 VITALS — HEIGHT: 66 IN | TEMPERATURE: 96.9 F | HEART RATE: 62 BPM | BODY MASS INDEX: 36.96 KG/M2 | WEIGHT: 230 LBS

## 2023-10-05 DIAGNOSIS — M17.0 PRIMARY OSTEOARTHRITIS OF BOTH KNEES: Primary | ICD-10-CM

## 2023-10-05 PROCEDURE — 1123F ACP DISCUSS/DSCN MKR DOCD: CPT | Performed by: FAMILY MEDICINE

## 2023-10-05 PROCEDURE — 99213 OFFICE O/P EST LOW 20 MIN: CPT | Performed by: FAMILY MEDICINE

## 2023-10-05 ASSESSMENT — ENCOUNTER SYMPTOMS
SHORTNESS OF BREATH: 0
BACK PAIN: 0
CONSTIPATION: 0
NAUSEA: 0
DIARRHEA: 0

## 2023-10-05 NOTE — PROGRESS NOTES
[facility] ([facility]) Physicians  Neurosurgery and Pain [facility]  [address] , [address] [address] [facility], [address] [place] [address] [place]: [phone]  F: [phone]      Martin Martinez  ([birth year])    10/5/2023    Subjective:     Martin Martinez is 80 y.o. female who complains today of:    Chief Complaint   Patient presents with    Knee Pain     bilateral       HPI     This patient returns stating that her right knee is starting improved somewhat but left knee is still very stubborn and has significant pain she is doing her physical therapy she is also been using ibuprofen and lidocaine cream  Allergies:  Atorvastatin, Erythromycin, Hydroxychloroquine, Penicillins, Amlodipine besylate, and Glimepiride    History reviewed. No pertinent past medical history. History reviewed. No pertinent surgical history. History reviewed. No pertinent family history.   Social History     Socioeconomic History    Marital status:      Spouse name: Not on file    Number of children: Not on file    Years of education: Not on file    Highest education level: Not on file   Occupational History    Not on file   Tobacco Use    Smoking status: Never    Smokeless tobacco: Never   Substance and Sexual Activity    Alcohol use: Never    Drug use: Never    Sexual activity: Not on file   Other Topics Concern    Not on file   Social History Narrative    Not on file     Social Determinants of Health     Financial Resource Strain: Not on file   Food Insecurity: Not on file   Transportation Needs: Not on file   Physical Activity: Not on file   Stress: Not on file   Social Connections: Not on file   Intimate Partner Violence: Not on file   Housing Stability: Not on file       Current Outpatient Medications on File Prior to Visit   Medication Sig Dispense Refill    Lidocaine 4 % OINT Apply 1 application  topically in the morning and at bedtime 150 g 0    allopurinol (ZYLOPRIM) 100 MG tablet       ALPRAZolam (XANAX) 0.5 MG tablet Take

## 2023-10-05 NOTE — PROGRESS NOTES
Therapy                            Cancellation/No-show Note    Date: 10/05/2023  Patient: Panchito Montana (39 y.o. female)  : 1955  MRN:  [id number]  Referring Physician: Anastasiia Oneill DO    Medical Diagnosis: Bilateral primary osteoarthritis of knee [M17.0]  Other specified joint disorders, unspecified hip [M25.859]      Visit Information:  Insurance: Payor: Dede Haddad / Plan: [employer] PPO / Product Type: Medicare /   Visits to Date: 5   No Show/Cancelled Appts:       For today's appointment patient:  [x]  Cancelled  []  Rescheduled appointment  []  No-show   []  Called pt to remind of next appointment     Reason given by patient:  [x]  Patient ill  []  Conflicting appointment  []  No transportation    []  Conflict with work  []  No reason given  []  Other:      [] Pt has future appointments scheduled, no follow up needed  [] Pt requests to be on hold. Reason:   If > 2 weeks please discuss with therapist.  [x] Therapist/ to call pt for follow up- Pt does not have any further appts at this time.       Comments:       Signature: Electronically signed by Yu Luna PTA on 10/5/23 at 9:19 AM EDT

## 2023-10-10 ENCOUNTER — TELEPHONE (OUTPATIENT)
Dept: PAIN MANAGEMENT | Age: 68
End: 2023-10-10

## 2023-10-10 NOTE — TELEPHONE ENCOUNTER
REFERRAL # [id number]      BILAT SYNSIC-ONE    AUTH FROM 10/9/23-1/9/24    OK to schedule procedure approved as above. Please note sides/levels approved and date range.    (If applicable, sides/levels approved may differ from those ordered)    TO BE SCHEDULED WITH DR Shanda Castillo

## 2023-10-16 PROBLEM — K58.9 IBS (IRRITABLE BOWEL SYNDROME): Status: ACTIVE | Noted: 2023-10-16

## 2023-10-16 PROBLEM — N18.30 CKD (CHRONIC KIDNEY DISEASE), STAGE III (MULTI): Status: ACTIVE | Noted: 2023-10-16

## 2023-10-16 PROBLEM — I65.09 VERTEBRAL ARTERY NARROWING: Status: ACTIVE | Noted: 2023-10-16

## 2023-10-16 PROBLEM — E04.1 THYROID NODULE: Status: ACTIVE | Noted: 2023-10-16

## 2023-10-16 PROBLEM — I10 ESSENTIAL HYPERTENSION: Status: ACTIVE | Noted: 2023-10-16

## 2023-10-16 PROBLEM — G45.9 TIA (TRANSIENT ISCHEMIC ATTACK): Status: ACTIVE | Noted: 2023-10-16

## 2023-10-16 PROBLEM — R20.9 SENSORY DISTURBANCE: Status: ACTIVE | Noted: 2023-10-16

## 2023-10-16 PROBLEM — E11.69 DM TYPE 2 WITH DIABETIC MIXED HYPERLIPIDEMIA (MULTI): Status: ACTIVE | Noted: 2023-10-16

## 2023-10-16 PROBLEM — I87.2 CHRONIC VENOUS INSUFFICIENCY: Status: ACTIVE | Noted: 2023-10-16

## 2023-10-16 PROBLEM — E79.0 HYPERURICEMIA: Status: ACTIVE | Noted: 2023-10-16

## 2023-10-16 PROBLEM — E78.2 MIXED HYPERLIPIDEMIA: Status: ACTIVE | Noted: 2023-10-16

## 2023-10-16 PROBLEM — M54.9 CHRONIC BACK PAIN: Status: ACTIVE | Noted: 2023-10-16

## 2023-10-16 PROBLEM — H81.10 BPPV (BENIGN PAROXYSMAL POSITIONAL VERTIGO): Status: ACTIVE | Noted: 2023-10-16

## 2023-10-16 PROBLEM — I50.32: Status: ACTIVE | Noted: 2023-10-16

## 2023-10-16 PROBLEM — M54.2 NECK PAIN: Status: ACTIVE | Noted: 2023-10-16

## 2023-10-16 PROBLEM — J01.90 ACUTE SINUSITIS: Status: ACTIVE | Noted: 2023-10-16

## 2023-10-16 PROBLEM — J44.9 CHRONIC OBSTRUCTIVE PULMONARY DISEASE (MULTI): Status: ACTIVE | Noted: 2023-10-16

## 2023-10-16 PROBLEM — I95.9 HYPOTENSION: Status: ACTIVE | Noted: 2023-10-16

## 2023-10-16 PROBLEM — G89.29 CHRONIC BACK PAIN: Status: ACTIVE | Noted: 2023-10-16

## 2023-10-16 PROBLEM — N91.2 AMENIA: Status: ACTIVE | Noted: 2023-10-16

## 2023-10-16 PROBLEM — F32.A DEPRESSION: Status: ACTIVE | Noted: 2023-10-16

## 2023-10-16 PROBLEM — N20.0 NEPHROLITHIASIS: Status: ACTIVE | Noted: 2023-10-16

## 2023-10-16 PROBLEM — J30.9 ALLERGIC RHINITIS: Status: ACTIVE | Noted: 2023-10-16

## 2023-10-16 PROBLEM — G45.0 VERTEBROBASILAR INSUFFICIENCY: Status: ACTIVE | Noted: 2023-10-16

## 2023-10-16 PROBLEM — J90 PLEURAL EFFUSION: Status: ACTIVE | Noted: 2023-10-16

## 2023-10-16 PROBLEM — Z98.61 CAD S/P PERCUTANEOUS CORONARY ANGIOPLASTY: Status: ACTIVE | Noted: 2023-10-16

## 2023-10-16 PROBLEM — I25.10 CAD S/P PERCUTANEOUS CORONARY ANGIOPLASTY: Status: ACTIVE | Noted: 2023-10-16

## 2023-10-16 PROBLEM — E11.42 DIABETIC PERIPHERAL NEUROPATHY (MULTI): Status: ACTIVE | Noted: 2023-10-16

## 2023-10-16 PROBLEM — F41.9 ANXIETY: Status: ACTIVE | Noted: 2023-10-16

## 2023-10-16 PROBLEM — K21.9 GERD (GASTROESOPHAGEAL REFLUX DISEASE): Status: ACTIVE | Noted: 2023-10-16

## 2023-10-16 PROBLEM — E78.2 DM TYPE 2 WITH DIABETIC MIXED HYPERLIPIDEMIA (MULTI): Status: ACTIVE | Noted: 2023-10-16

## 2023-10-16 PROBLEM — G62.9 PERIPHERAL POLYNEUROPATHY: Status: ACTIVE | Noted: 2023-10-16

## 2023-10-16 PROBLEM — E55.9 VITAMIN D DEFICIENCY: Status: ACTIVE | Noted: 2023-10-16

## 2023-10-16 PROBLEM — N28.9 RENAL INSUFFICIENCY: Status: ACTIVE | Noted: 2023-10-16

## 2023-10-16 RX ORDER — CLOPIDOGREL BISULFATE 75 MG/1
1 TABLET ORAL DAILY
COMMUNITY
Start: 2022-03-16 | End: 2023-12-04

## 2023-10-16 RX ORDER — IPRATROPIUM BROMIDE AND ALBUTEROL SULFATE 2.5; .5 MG/3ML; MG/3ML
SOLUTION RESPIRATORY (INHALATION)
COMMUNITY
Start: 2021-07-19

## 2023-10-16 RX ORDER — SIMVASTATIN 20 MG/1
1 TABLET, FILM COATED ORAL DAILY
COMMUNITY
Start: 2020-07-30 | End: 2024-05-03

## 2023-10-16 RX ORDER — MINERAL OIL
1 ENEMA (ML) RECTAL DAILY
COMMUNITY
Start: 2020-06-29 | End: 2024-03-04

## 2023-10-16 RX ORDER — METOPROLOL SUCCINATE 50 MG/1
50 TABLET, EXTENDED RELEASE ORAL
COMMUNITY
End: 2023-11-06

## 2023-10-16 RX ORDER — LOPERAMIDE HYDROCHLORIDE 2 MG/1
1 CAPSULE ORAL DAILY PRN
COMMUNITY

## 2023-10-16 RX ORDER — TIOTROPIUM BROMIDE INHALATION SPRAY 1.56 UG/1
2 SPRAY, METERED RESPIRATORY (INHALATION) DAILY
COMMUNITY

## 2023-10-16 RX ORDER — FLUTICASONE PROPIONATE 50 MCG
2 SPRAY, SUSPENSION (ML) NASAL DAILY
COMMUNITY
Start: 2020-07-15 | End: 2024-03-04

## 2023-10-16 RX ORDER — SITAGLIPTIN 50 MG/1
50 TABLET, FILM COATED ORAL
COMMUNITY
Start: 2023-03-25

## 2023-10-16 RX ORDER — GLIMEPIRIDE 2 MG/1
2 TABLET ORAL 2 TIMES DAILY
COMMUNITY
Start: 2023-04-15 | End: 2024-01-19

## 2023-10-16 RX ORDER — POTASSIUM CHLORIDE 750 MG/1
1 TABLET, EXTENDED RELEASE ORAL DAILY
COMMUNITY
Start: 2019-12-20 | End: 2024-03-07

## 2023-10-16 RX ORDER — FLUTICASONE PROPIONATE AND SALMETEROL 250; 50 UG/1; UG/1
1 POWDER RESPIRATORY (INHALATION) 2 TIMES DAILY
COMMUNITY
Start: 2022-08-04 | End: 2024-03-07

## 2023-10-16 RX ORDER — LANOLIN ALCOHOL/MO/W.PET/CERES
1 CREAM (GRAM) TOPICAL DAILY
COMMUNITY
Start: 2020-12-21

## 2023-10-16 RX ORDER — DICYCLOMINE HYDROCHLORIDE 20 MG/1
1 TABLET ORAL 3 TIMES DAILY
COMMUNITY
Start: 2021-02-25

## 2023-10-16 RX ORDER — CYCLOBENZAPRINE HCL 5 MG
5 TABLET ORAL
COMMUNITY
Start: 2023-01-10 | End: 2023-11-21 | Stop reason: ALTCHOICE

## 2023-10-16 RX ORDER — METFORMIN HYDROCHLORIDE 1000 MG/1
1000 TABLET ORAL 2 TIMES DAILY
COMMUNITY
Start: 2020-06-29 | End: 2023-11-21 | Stop reason: ALTCHOICE

## 2023-10-16 RX ORDER — SERTRALINE HYDROCHLORIDE 100 MG/1
100 TABLET, FILM COATED ORAL
COMMUNITY
Start: 2020-04-15 | End: 2024-02-17

## 2023-10-16 RX ORDER — ALLOPURINOL 100 MG/1
100 TABLET ORAL DAILY
COMMUNITY
Start: 2022-06-30 | End: 2024-05-31

## 2023-10-16 RX ORDER — PROPRANOLOL HYDROCHLORIDE 60 MG/1
1 CAPSULE, EXTENDED RELEASE ORAL DAILY
COMMUNITY
Start: 2021-05-11

## 2023-10-16 RX ORDER — LISINOPRIL 20 MG/1
20 TABLET ORAL DAILY
COMMUNITY
Start: 2020-04-15 | End: 2024-03-29

## 2023-10-16 RX ORDER — LIDOCAINE 50 MG/G
1 PATCH TOPICAL EVERY 12 HOURS
COMMUNITY
Start: 2022-09-29 | End: 2023-12-06 | Stop reason: ALTCHOICE

## 2023-10-16 RX ORDER — ASPIRIN 81 MG/1
1 TABLET ORAL DAILY
COMMUNITY
Start: 2020-04-15 | End: 2023-11-07 | Stop reason: ALTCHOICE

## 2023-10-16 RX ORDER — DAPAGLIFLOZIN 10 MG/1
10 TABLET, FILM COATED ORAL
COMMUNITY
Start: 2023-08-04

## 2023-10-16 RX ORDER — ERGOCALCIFEROL 1.25 MG/1
50000 CAPSULE ORAL
COMMUNITY
Start: 2022-06-30 | End: 2023-11-21 | Stop reason: ALTCHOICE

## 2023-10-16 RX ORDER — MECLIZINE HYDROCHLORIDE 25 MG/1
1 TABLET ORAL 3 TIMES DAILY PRN
COMMUNITY
Start: 2020-06-09 | End: 2024-04-25

## 2023-10-16 RX ORDER — GABAPENTIN 300 MG/1
300 CAPSULE ORAL 3 TIMES DAILY
COMMUNITY
Start: 2021-12-20 | End: 2024-01-03

## 2023-10-16 RX ORDER — PANTOPRAZOLE SODIUM 40 MG/1
1 TABLET, DELAYED RELEASE ORAL 2 TIMES DAILY
COMMUNITY
Start: 2020-04-15

## 2023-10-16 RX ORDER — ALBUTEROL SULFATE 90 UG/1
AEROSOL, METERED RESPIRATORY (INHALATION)
COMMUNITY
Start: 2022-09-08 | End: 2024-02-19

## 2023-10-16 RX ORDER — FUROSEMIDE 20 MG/1
20 TABLET ORAL 2 TIMES DAILY
COMMUNITY
Start: 2019-11-25 | End: 2023-11-08

## 2023-10-16 RX ORDER — DOXYCYCLINE 100 MG/1
100 CAPSULE ORAL EVERY 12 HOURS
COMMUNITY
Start: 2022-03-17 | End: 2023-11-21 | Stop reason: ALTCHOICE

## 2023-10-18 ENCOUNTER — APPOINTMENT (OUTPATIENT)
Dept: UROLOGY | Facility: CLINIC | Age: 68
End: 2023-10-18
Payer: MEDICARE

## 2023-10-26 DIAGNOSIS — N18.30 CHRONIC KIDNEY DISEASE, STAGE 3 UNSPECIFIED (MULTI): ICD-10-CM

## 2023-11-01 ENCOUNTER — OFFICE VISIT (OUTPATIENT)
Dept: UROLOGY | Facility: CLINIC | Age: 68
End: 2023-11-01
Payer: MEDICARE

## 2023-11-01 DIAGNOSIS — N39.46 MIXED STRESS AND URGE URINARY INCONTINENCE: Primary | ICD-10-CM

## 2023-11-01 DIAGNOSIS — R35.0 FREQUENCY OF MICTURITION: ICD-10-CM

## 2023-11-01 DIAGNOSIS — R39.15 URGENCY OF URINATION: ICD-10-CM

## 2023-11-01 LAB
POC APPEARANCE, URINE: CLEAR
POC BILIRUBIN, URINE: NEGATIVE
POC BLOOD, URINE: NEGATIVE
POC COLOR, URINE: YELLOW
POC GLUCOSE, URINE: ABNORMAL MG/DL
POC KETONES, URINE: NEGATIVE MG/DL
POC LEUKOCYTES, URINE: NEGATIVE
POC NITRITE,URINE: NEGATIVE
POC PH, URINE: 5.5 PH
POC PROTEIN, URINE: NEGATIVE MG/DL
POC SPECIFIC GRAVITY, URINE: 1.01
POC UROBILINOGEN, URINE: 0.2 EU/DL

## 2023-11-01 PROCEDURE — 4010F ACE/ARB THERAPY RXD/TAKEN: CPT | Performed by: NURSE PRACTITIONER

## 2023-11-01 PROCEDURE — 3074F SYST BP LT 130 MM HG: CPT | Performed by: NURSE PRACTITIONER

## 2023-11-01 PROCEDURE — 51798 US URINE CAPACITY MEASURE: CPT | Performed by: NURSE PRACTITIONER

## 2023-11-01 PROCEDURE — 3078F DIAST BP <80 MM HG: CPT | Performed by: NURSE PRACTITIONER

## 2023-11-01 PROCEDURE — 81003 URINALYSIS AUTO W/O SCOPE: CPT | Performed by: NURSE PRACTITIONER

## 2023-11-01 PROCEDURE — 99214 OFFICE O/P EST MOD 30 MIN: CPT | Performed by: NURSE PRACTITIONER

## 2023-11-01 PROCEDURE — 1036F TOBACCO NON-USER: CPT | Performed by: NURSE PRACTITIONER

## 2023-11-01 PROCEDURE — 1159F MED LIST DOCD IN RCRD: CPT | Performed by: NURSE PRACTITIONER

## 2023-11-01 RX ORDER — MULTIVITAMIN
1 TABLET ORAL DAILY
COMMUNITY

## 2023-11-01 RX ORDER — OXYBUTYNIN CHLORIDE 5 MG/1
5 TABLET, EXTENDED RELEASE ORAL DAILY
Qty: 30 TABLET | Refills: 11 | Status: SHIPPED | OUTPATIENT
Start: 2023-11-01 | End: 2023-11-21 | Stop reason: ALTCHOICE

## 2023-11-01 NOTE — PATIENT INSTRUCTIONS
Oxybutynin ER 5 mg daily, discussed constipation prevention  Psyllium fiber (metamucil) or colace     Has order for blood work HgbA1c and follow up w Dr. Gallegos as planned  I believe her increased urinary symptoms may be r/t DM Hgb A1c was 14 last check  And blood glucose still running 400s in a.m.    Follow up 4-6 weeks Olimpia pelvic exam, PVR, med response    Nurse line [phone]

## 2023-11-01 NOTE — PROGRESS NOTES
11/01/23   [id number]    Urinary symptoms     Subjective      HPI Catherine Patel is a 68 y.o. female who presents for increased urinary incontinence.  Wondered if kidney stones as she has so much incontinence past few mos and forcing to finish last part out to urinate, PVR 48 ml, UA negative except for glucose 500; Hgb A1c 14.8 few mos ago, has appt. later this month for DM recheck may consider going on insulin per patient if not under control; per patient sugar running 400 in a.m. on glimiperide increased last visit by Dr. Gallegos, patient has order for HgbA1c, will go and do sooner and follow up w Dr. Gallegos; would like OAB med until blood glucose can get under control.    no hematuria; no UTI, reviewed urine last year did not show glucose;     CT 8/2022 no urolithiasis or obstructive uropathy; no renal masses;     The CT in Aug 2022 was done after MARKO showed possible stone;         Objective     There were no vitals taken for this visit.   Physical Exam  General: Appears comfortable and in no apparent distress, well nourished  Head: Normocephalic, atraumatic  Neck: trachea midline  Respiratory: respirations unlabored, no wheezes, and no use of accessory muscles  Cardiovascular: at rest no dyspnea, well perfused  Skin: no visible rashes or lesions  Neurologic: grossly intact, oriented to person, place, and time  Psychiatric: mood and affect appropriate  Musculoskeletal: in chair for appt. no difficulty w upper body movement      Assessment/Plan   Problem List Items Addressed This Visit    None  Visit Diagnoses       Mixed stress and urge urinary incontinence    -  Primary    Relevant Medications    oxybutynin XL (Ditropan XL) 5 mg 24 hr tablet    Other Relevant Orders    POCT UA Automated manually resulted (Completed)    Post-Void Residual (Completed)    Urgency of urination        Relevant Medications    oxybutynin XL (Ditropan XL) 5 mg 24 hr tablet    Frequency of micturition              Orders Placed This  Encounter   Procedures    Post-Void Residual    POCT UA Automated manually resulted     Order Specific Question:   Release result to [employer]     Answer:   Immediate [1]      Oxybutynin ER 5 mg daily, discussed constipation prevention  Psyllium fiber (metamucil) or colace     Has order for blood work HgbA1c and follow up w Dr. Gallegos as planned  I believe her increased urinary symptoms may be r/t DM Hgb A1c was 14 last check  And blood glucose still running 400s in a.m.    Follow up 4-6 weeks Olimpia pelvic exam, PVR, med response    Nurse line [phone]       Olimpia Rosas, APRN-CNP  Lab Results   Component Value Date    GLUCOSE 174 (H) 05/31/2022    CALCIUM 9.3 05/31/2022     05/31/2022    K 4.3 05/31/2022    CO2 27 05/31/2022     05/31/2022    BUN 32 (H) 05/31/2022    CREATININE 1.88 (H) 05/31/2022

## 2023-11-02 ENCOUNTER — OFFICE VISIT (OUTPATIENT)
Dept: PAIN MANAGEMENT | Age: 68
End: 2023-11-02
Payer: MEDICARE

## 2023-11-02 VITALS
TEMPERATURE: 97.6 F | DIASTOLIC BLOOD PRESSURE: 70 MMHG | HEIGHT: 65 IN | SYSTOLIC BLOOD PRESSURE: 128 MMHG | BODY MASS INDEX: 38.32 KG/M2 | WEIGHT: 230 LBS

## 2023-11-02 DIAGNOSIS — M17.0 PRIMARY OSTEOARTHRITIS OF BOTH KNEES: ICD-10-CM

## 2023-11-02 DIAGNOSIS — G89.4 CHRONIC PAIN SYNDROME: ICD-10-CM

## 2023-11-02 DIAGNOSIS — M47.812 CERVICAL SPONDYLOSIS WITHOUT MYELOPATHY: Primary | ICD-10-CM

## 2023-11-02 PROCEDURE — 99214 OFFICE O/P EST MOD 30 MIN: CPT | Performed by: PAIN MEDICINE

## 2023-11-02 PROCEDURE — 1123F ACP DISCUSS/DSCN MKR DOCD: CPT | Performed by: PAIN MEDICINE

## 2023-11-02 RX ORDER — DAPAGLIFLOZIN 10 MG/1
TABLET, FILM COATED ORAL
COMMUNITY
Start: 2023-10-30

## 2023-11-02 RX ORDER — TRAMADOL HYDROCHLORIDE 50 MG/1
50 TABLET ORAL 2 TIMES DAILY PRN
Qty: 60 TABLET | Refills: 0 | Status: SHIPPED | OUTPATIENT
Start: 2023-11-02 | End: 2023-12-02

## 2023-11-02 NOTE — PROGRESS NOTES
file.  Social History     Socioeconomic History    Marital status:      Spouse name: Not on file    Number of children: Not on file    Years of education: Not on file    Highest education level: Not on file   Occupational History    Not on file   Tobacco Use    Smoking status: Never    Smokeless tobacco: Never   Substance and Sexual Activity    Alcohol use: Never    Drug use: Never    Sexual activity: Not on file   Other Topics Concern    Not on file   Social History Narrative    Not on file     Social Determinants of Health     Financial Resource Strain: Not on file   Food Insecurity: Not on file   Transportation Needs: Not on file   Physical Activity: Not on file   Stress: Not on file   Social Connections: Not on file   Intimate Partner Violence: Not on file   Housing Stability: Not on file       Current Outpatient Medications on File Prior to Visit   Medication Sig Dispense Refill    FARXIGA 10 MG tablet       Lidocaine 4 % OINT Apply 1 application  topically in the morning and at bedtime 150 g 0    allopurinol (ZYLOPRIM) 100 MG tablet       ALPRAZolam (XANAX) 0.5 MG tablet Take by mouth.      calcium carb-cholecalciferol 600-10 MG-MCG TABS per tab       Calcium Carbonate-Vitamin D 600-5 MG-MCG TABS Take by mouth      clopidogrel (PLAVIX) 75 MG tablet Take 1 tablet by mouth daily      dicyclomine (BENTYL) 20 MG tablet Take by mouth      fluticasone-salmeterol (ADVAIR) 500-50 MCG/ACT AEPB diskus inhaler Inhale 1 puff into the lungs 2 times daily      gabapentin (NEURONTIN) 300 MG capsule TAKE 2 CAPSULES BY MOUTH 3 TIMES A DAY      glimepiride (AMARYL) 2 MG tablet TAKE 1 TABLET BY MOUTH TWICE A DAY AS DIRECTED       Current Facility-Administered Medications on File Prior to Visit   Medication Dose Route Frequency Provider Last Rate Last Admin    iopamidol (ISOVUE-300) 61 % injection 2 mL  2 mL Other ONCE PRN Ana Maria Morrell MD               HPI    Review of Systems   All other systems reviewed and are

## 2023-11-03 ENCOUNTER — TELEPHONE (OUTPATIENT)
Dept: PAIN MANAGEMENT | Age: 68
End: 2023-11-03

## 2023-11-03 DIAGNOSIS — I25.10 ATHEROSCLEROTIC HEART DISEASE OF NATIVE CORONARY ARTERY WITHOUT ANGINA PECTORIS: ICD-10-CM

## 2023-11-03 DIAGNOSIS — Z98.61 CORONARY ANGIOPLASTY STATUS: ICD-10-CM

## 2023-11-03 NOTE — TELEPHONE ENCOUNTER
MARIBEL C5,6,7 MBB    AUTH APPROVED FROM 11/3/23-5/1/24    REFERRAL # [id number]  OK to schedule procedure approved as above. Please note sides/levels approved and date range.    (If applicable, sides/levels approved may differ from those ordered)    TO [address] [place] [id number]

## 2023-11-06 RX ORDER — METOPROLOL SUCCINATE 50 MG/1
TABLET, EXTENDED RELEASE ORAL
Qty: 135 TABLET | Refills: 1 | Status: SHIPPED | OUTPATIENT
Start: 2023-11-06 | End: 2024-05-10

## 2023-11-07 RX ORDER — NITROGLYCERIN 0.4 MG/1
0.4 TABLET SUBLINGUAL EVERY 5 MIN PRN
COMMUNITY

## 2023-11-08 RX ORDER — FUROSEMIDE 20 MG/1
20 TABLET ORAL 2 TIMES DAILY
Qty: 180 TABLET | Refills: 3 | Status: SHIPPED | OUTPATIENT
Start: 2023-11-08

## 2023-11-20 ENCOUNTER — OFFICE VISIT (OUTPATIENT)
Dept: PRIMARY CARE | Facility: CLINIC | Age: 68
End: 2023-11-20
Payer: MEDICARE

## 2023-11-20 VITALS
WEIGHT: 225.4 LBS | BODY MASS INDEX: 37.55 KG/M2 | TEMPERATURE: 97 F | HEIGHT: 65 IN | HEART RATE: 69 BPM | DIASTOLIC BLOOD PRESSURE: 64 MMHG | SYSTOLIC BLOOD PRESSURE: 86 MMHG

## 2023-11-20 DIAGNOSIS — E11.42 DIABETIC PERIPHERAL NEUROPATHY (MULTI): Primary | ICD-10-CM

## 2023-11-20 DIAGNOSIS — Z87.891 FORMER SMOKER: ICD-10-CM

## 2023-11-20 DIAGNOSIS — Z00.00 ENCOUNTER FOR ANNUAL WELLNESS EXAM IN MEDICARE PATIENT: ICD-10-CM

## 2023-11-20 PROCEDURE — 1160F RVW MEDS BY RX/DR IN RCRD: CPT | Performed by: FAMILY MEDICINE

## 2023-11-20 PROCEDURE — 3008F BODY MASS INDEX DOCD: CPT | Performed by: FAMILY MEDICINE

## 2023-11-20 PROCEDURE — 1159F MED LIST DOCD IN RCRD: CPT | Performed by: FAMILY MEDICINE

## 2023-11-20 PROCEDURE — 3078F DIAST BP <80 MM HG: CPT | Performed by: FAMILY MEDICINE

## 2023-11-20 PROCEDURE — G0439 PPPS, SUBSEQ VISIT: HCPCS | Performed by: FAMILY MEDICINE

## 2023-11-20 PROCEDURE — 1036F TOBACCO NON-USER: CPT | Performed by: FAMILY MEDICINE

## 2023-11-20 PROCEDURE — 3074F SYST BP LT 130 MM HG: CPT | Performed by: FAMILY MEDICINE

## 2023-11-20 PROCEDURE — 1170F FXNL STATUS ASSESSED: CPT | Performed by: FAMILY MEDICINE

## 2023-11-20 PROCEDURE — 4010F ACE/ARB THERAPY RXD/TAKEN: CPT | Performed by: FAMILY MEDICINE

## 2023-11-20 ASSESSMENT — PATIENT HEALTH QUESTIONNAIRE - PHQ9
5. POOR APPETITE OR OVEREATING: NEARLY EVERY DAY
10. IF YOU CHECKED OFF ANY PROBLEMS, HOW DIFFICULT HAVE THESE PROBLEMS MADE IT FOR YOU TO DO YOUR WORK, TAKE CARE OF THINGS AT HOME, OR GET ALONG WITH OTHER PEOPLE: SOMEWHAT DIFFICULT
6. FEELING BAD ABOUT YOURSELF - OR THAT YOU ARE A FAILURE OR HAVE LET YOURSELF OR YOUR FAMILY DOWN: NOT AT ALL
1. LITTLE INTEREST OR PLEASURE IN DOING THINGS: NOT AT ALL
9. THOUGHTS THAT YOU WOULD BE BETTER OFF DEAD, OR OF HURTING YOURSELF: NOT AT ALL
2. FEELING DOWN, DEPRESSED OR HOPELESS: NEARLY EVERY DAY
3. TROUBLE FALLING OR STAYING ASLEEP OR SLEEPING TOO MUCH: MORE THAN HALF THE DAYS
8. MOVING OR SPEAKING SO SLOWLY THAT OTHER PEOPLE COULD HAVE NOTICED. OR THE OPPOSITE, BEING SO FIGETY OR RESTLESS THAT YOU HAVE BEEN MOVING AROUND A LOT MORE THAN USUAL: NOT AT ALL
SUM OF ALL RESPONSES TO PHQ9 QUESTIONS 1 AND 2: 3
SUM OF ALL RESPONSES TO PHQ QUESTIONS 1-9: 12
4. FEELING TIRED OR HAVING LITTLE ENERGY: NEARLY EVERY DAY
7. TROUBLE CONCENTRATING ON THINGS, SUCH AS READING THE NEWSPAPER OR WATCHING TELEVISION: SEVERAL DAYS

## 2023-11-20 ASSESSMENT — ACTIVITIES OF DAILY LIVING (ADL)
DOING_HOUSEWORK: INDEPENDENT
BATHING: INDEPENDENT
GROCERY_SHOPPING: INDEPENDENT
DRESSING: INDEPENDENT
MANAGING_FINANCES: INDEPENDENT
TAKING_MEDICATION: INDEPENDENT

## 2023-11-21 ENCOUNTER — PROCEDURE VISIT (OUTPATIENT)
Dept: PAIN MANAGEMENT | Age: 68
End: 2023-11-21
Payer: MEDICARE

## 2023-11-21 DIAGNOSIS — M47.812 CERVICAL SPONDYLOSIS WITHOUT MYELOPATHY: ICD-10-CM

## 2023-11-21 PROCEDURE — 64490 INJ PARAVERT F JNT C/T 1 LEV: CPT | Performed by: PAIN MEDICINE

## 2023-11-21 PROCEDURE — 64491 INJ PARAVERT F JNT C/T 2 LEV: CPT | Performed by: PAIN MEDICINE

## 2023-11-21 RX ORDER — LIDOCAINE HYDROCHLORIDE 10 MG/ML
3 INJECTION, SOLUTION EPIDURAL; INFILTRATION; INTRACAUDAL; PERINEURAL ONCE
Status: COMPLETED | OUTPATIENT
Start: 2023-11-21 | End: 2023-11-21

## 2023-11-21 RX ADMIN — LIDOCAINE HYDROCHLORIDE 3 ML: 10 INJECTION, SOLUTION EPIDURAL; INFILTRATION; INTRACAUDAL; PERINEURAL at 12:00

## 2023-11-21 ASSESSMENT — ENCOUNTER SYMPTOMS
CARDIOVASCULAR NEGATIVE: 1
ENDOCRINE NEGATIVE: 1
NEUROLOGICAL NEGATIVE: 1
BACK PAIN: 1
EYES NEGATIVE: 1
ARTHRALGIAS: 1
CONSTITUTIONAL NEGATIVE: 1
ALLERGIC/IMMUNOLOGIC NEGATIVE: 1
GASTROINTESTINAL NEGATIVE: 1
RESPIRATORY NEGATIVE: 1
NECK PAIN: 1
HEMATOLOGIC/LYMPHATIC NEGATIVE: 1
PSYCHIATRIC NEGATIVE: 1

## 2023-11-21 NOTE — PROGRESS NOTES
Subjective Catherine is here for wellness visit and follow-up on hypertension and diabetes and hyperlipidemia.  She has had bilateral knee pain for which she is seeing orthopedics and physical therapy.  She also has chronic neck pain and is going to pain management regarding this.  She continues on her meds noted.  Her fasting blood sugars have been running very high from 300-500 at home.  She continues on her meds as noted.    Patient ID: Catherine Patel is a 68 y.o. female who presents for Medicare Annual Wellness Visit Subsequent (AWV:  lab review: ):    Problem List Items Addressed This Visit       Diabetic peripheral neuropathy (CMS/[facility]) - Primary    Relevant Orders    Comprehensive Metabolic Panel    Lipid Panel    Hemoglobin A1C    Comprehensive Metabolic Panel    CBC    Lipid Panel    Hemoglobin A1C     Other Visit Diagnoses       Former smoker        BMI 37.0-37.9, adult               Past Medical History:   Diagnosis Date    Body mass index (BMI)40.0-44.9, adult 07/29/2022    BMI 40.0-44.9, adult    Carpal tunnel syndrome, left upper limb 09/16/2021    Carpal tunnel syndrome of left wrist    Dizziness and giddiness 09/16/2021    Postural dizziness    History of falling 09/08/2022    Status post fall    Localized edema 07/29/2022    Edema leg    Morbid (severe) obesity due to excess calories (CMS/HCC) 09/29/2022    Morbid obesity with BMI of 40.0-44.9, adult    Pain in right hip 01/20/2022    Right hip pain    Personal history of other endocrine, nutritional and metabolic disease 01/20/2022    History of morbid obesity    Personal history of other specified conditions 10/23/2021    History of vertigo    Personal history of other specified conditions 07/13/2022    History of urinary frequency    Personal history of other specified conditions 08/30/2022    History of edema    Personal history of other specified conditions 08/30/2022    History of shortness of breath    Pneumonia, unspecified organism      Pneumonia of right lung due to infectious organism, unspecified part of lung    Shortness of breath 2022    Shortness of breath on exertion    Syncope and collapse 2021    Near syncope      Past Surgical History:   Procedure Laterality Date    MR HEAD ANGIO WO IV CONTRAST  3/7/2020    MR HEAD ANGIO WO IV CONTRAST 3/7/2020 [facility]    MR NECK ANGIO WO IV CONTRAST  3/7/2020    MR NECK ANGIO WO IV CONTRAST 3/7/2020 [facility]    OTHER SURGICAL HISTORY  2022    Eye surgery    OTHER SURGICAL HISTORY  2022    Appendectomy    OTHER SURGICAL HISTORY  2022    Cyst excision    OTHER SURGICAL HISTORY  2022    Cardiac catheterization with stent placement    OTHER SURGICAL HISTORY  2022    Tonsillectomy with adenoidectomy    OTHER SURGICAL HISTORY  2022    Esophagogastroduodenoscopy    OTHER SURGICAL HISTORY  2022    Ankle surgery    OTHER SURGICAL HISTORY  2022     section    OTHER SURGICAL HISTORY  11/10/2022    Complete colonoscopy      Family History   Problem Relation Name Age of Onset    Arthritis Mother      Hypertension Mother      Lung cancer Mother      COPD Father      Other (CARDIAC DISORDER) Father      Hyperlipidemia Father      Hypertension Father      Diabetes Father      Other (MESONEPHROMA, BENIGN) Father      Colonic polyp Sister      Thyroid cancer Sister      Other (HEART VALVE REPLACED) Father's Sister        Social History     Socioeconomic History    Marital status:      Spouse name: Not on file    Number of children: Not on file    Years of education: Not on file    Highest education level: Not on file   Occupational History    Not on file   Tobacco Use    Smoking status: Former     Types: Cigarettes    Smokeless tobacco: Never   Substance and Sexual Activity    Alcohol use: Not Currently    Drug use: Never    Sexual activity: Not on file   Other Topics Concern    Not on file   Social History Narrative    Not  on file     Social Determinants of Health     Financial Resource Strain: Not on file   Food Insecurity: Not on file   Transportation Needs: Not on file   Physical Activity: Not on file   Stress: Not on file   Social Connections: Not on file   Intimate Partner Violence: Not on file   Housing Stability: Not on file      Erythromycin, Hydroxychloroquine, Penicillins, Adhesive tape-silicones, Amlodipine, and Atorvastatin   Current Outpatient Medications   Medication Sig Dispense Refill    albuterol 90 mcg/actuation inhaler Inhale. TAKE PER DIRECTED 1-2 PUFFS EVERY 4-6 HRS      allopurinol (Zyloprim) 100 mg tablet Take 1 tablet (100 mg) by mouth once daily.      calcium carbonate-vitamin D3 (Calcium 600 + D,3,) 600 mg-10 mcg (400 unit) tablet Take 1 tablet by mouth once daily.      clopidogrel (Plavix) 75 mg tablet Take 1 tablet (75 mg) by mouth once daily. TAKE PER DIRECTED      cyanocobalamin (Vitamin B-12) 1,000 mcg tablet Take 1 tablet (1,000 mcg) by mouth once daily. TAKE PER DIRECTED      dapagliflozin propanediol (Farxiga) 10 mg Take 1 tablet (10 mg) by mouth once daily in the morning. Take before meals.      dicyclomine (Bentyl) 20 mg tablet Take 1 tablet (20 mg) by mouth 3 times a day. TAKE PER DIRECTED      fexofenadine (Allegra) 180 mg tablet Take 1 tablet (180 mg) by mouth once daily. TAKE PER DIRECTED      fluticasone (Flonase) 50 mcg/actuation nasal spray Administer 2 sprays into affected nostril(s) once daily. TAKE PER DIRECTED      furosemide (Lasix) 20 mg tablet TAKE 1 TABLET BY MOUTH TWICE A  tablet 3    gabapentin (Neurontin) 300 mg capsule Take 1 capsule (300 mg) by mouth 3 times a day. TAKE PER DIRECTED      glimepiride (Amaryl) 2 mg tablet Take 1 tablet (2 mg) by mouth 2 times a day. TAKE PER DIRECTED      ipratropium-albuteroL (Duo-Neb) 0.5-2.5 mg/3 mL nebulizer solution Inhale. TAKE PER DIRECTED      Januvia 50 mg tablet Take 1 tablet (50 mg) by mouth. TAKE PER DIRECTED      lidocaine  (Lidoderm) 5 % patch Place 1 patch on the skin every 12 hours. Apply 1 patch and leave in place for 12 hours, then remove and leave off for 12 hours.      lisinopril 20 mg tablet Take 1 tablet (20 mg) by mouth 2 times a day.      loperamide (Imodium A-D) 2 mg capsule Take 1 capsule (2 mg) by mouth every other day. TAKE PER DIRECTED      meclizine (Antivert) 25 mg tablet Take 1 tablet (25 mg) by mouth 3 times a day as needed.      metoprolol succinate XL (Toprol-XL) 50 mg 24 hr tablet TAKE 1 TABLET IN AM AND 1/2 TABLET IN PM FOR TOTAL OF 75MG DAILY 135 tablet 1    nitroglycerin (Nitrostat) 0.4 mg SL tablet Place 1 tablet (0.4 mg) under the tongue every 5 minutes if needed for chest pain.      pantoprazole (ProtoNix) 40 mg EC tablet Take 1 tablet (40 mg) by mouth 2 times a day.      potassium chloride CR 10 mEq ER tablet Take 1 tablet (10 mEq) by mouth once daily.      propranolol LA (Inderal LA) 60 mg 24 hr capsule Take 1 capsule (60 mg) by mouth once daily.      sertraline (Zoloft) 100 mg tablet Take 1 tablet (100 mg) by mouth once daily. TAKE 1 1/2 TAB PER DIRECTED      simvastatin (Zocor) 20 mg tablet Take 1 tablet (20 mg) by mouth once daily.      Spiriva Respimat 1.25 mcg/actuation inhaler Inhale 2 puffs once daily.      Wixela Inhub 250-50 mcg/dose diskus inhaler Inhale 1 puff twice a day. TAKE PER DIRECTED      cyclobenzaprine (Flexeril) 5 mg tablet Take 1 tablet (5 mg) by mouth. TAKE PER DIRECTED      doxycycline (Vibramycin) 100 mg capsule Take 1 capsule (100 mg) by mouth every 12 hours. TAKE PER DIRECTED      ergocalciferol (Vitamin D-2) 1.25 MG (46230 UT) capsule Take 1 capsule (50,000 Units) by mouth. TAKE PER DIRECTED      metFORMIN (Glucophage) 1,000 mg tablet Take 1 tablet (1,000 mg) by mouth 2 times a day. TAKE PER DIRECTED      oxybutynin XL (Ditropan XL) 5 mg 24 hr tablet Take 1 tablet (5 mg) by mouth once daily. Do not crush, chew, or split. (Patient not taking: Reported on 11/20/2023) 30 tablet 11      No current facility-administered medications for this visit.       Immunization History   Administered Date(s) Administered    Flu vaccine (IIV4), preservative free *Check age/dose* 10/19/2016, 10/03/2017, 10/01/2019    Flu vaccine, quadrivalent, high-dose, preservative free, age 65y+ (FLUZONE) 12/23/2021, 02/02/2023    Flu vaccine, quadrivalent, recombinant, preservative free, adult (FLUBLOK) 11/06/2019    Hepatitis B vaccine, adult (RECOMBIVAX, ENGERIX) 09/13/2010, 10/18/2010, 04/08/2011    Influenza, Unspecified 09/01/2013, 10/13/2014    Influenza, injectable, MDCK, preservative free, quadrivalent 12/09/2018    Influenza, injectable, quadrivalent 10/13/2020    Influenza, seasonal, injectable, preservative free 12/02/2015    PPD Test 05/16/2011    Pfizer Purple Cap SARS-CoV-2 04/17/2021, 05/08/2021, 12/23/2021    Pneumococcal conjugate vaccine, 13-valent (PREVNAR 13) 05/31/2022    Pneumococcal polysaccharide vaccine, 23-valent, age 2 years and older (PNEUMOVAX 23) 03/11/2016    Tdap vaccine, age 7 year and older (BOOSTRIX) 10/17/2017    Zoster vaccine, recombinant, adult (SHINGRIX) 11/06/2019, 10/13/2020, 02/06/2021    Zoster, Unspecified 10/01/2019        Review of Systems   Constitutional: Negative.    HENT: Negative.     Eyes: Negative.    Respiratory: Negative.     Cardiovascular: Negative.    Gastrointestinal: Negative.    Endocrine: Negative.    Genitourinary: Negative.    Musculoskeletal:  Positive for arthralgias, back pain and neck pain.   Skin: Negative.    Allergic/Immunologic: Negative.    Neurological: Negative.    Hematological: Negative.    Psychiatric/Behavioral: Negative.          Vitals:    11/20/23 1115   BP: 86/64   Pulse: 69   Temp: 36.1 °C (97 °F)     Vitals:    11/20/23 1115   Weight: 102 kg (225 lb 6.4 oz)      Physical Exam  Constitutional:       General: She is not in acute distress.     Appearance: Normal appearance.   Cardiovascular:      Rate and Rhythm: Normal rate and regular  rhythm.      Pulses: Normal pulses.      Heart sounds: Normal heart sounds.   Pulmonary:      Effort: Pulmonary effort is normal.      Breath sounds: Normal breath sounds.   Neurological:      Mental Status: She is alert.   Psychiatric:         Mood and Affect: Mood normal.         Thought Content: Thought content normal.          ASSESSMENT/PLAN: Annual wellness visit  Hypertension with low reading  Diabetes mellitus type 2  Hyperlipidemia  Bilateral knee DJD  Chronic neck pain  Urinary incontinence followed by urology  Chronic kidney disease followed by Dr. Velazco  Asthma monitored by pulmonology  Sleep apnea on CPAP    Plan--continue current meds except decrease lisinopril to 20 mg daily  Check CBC CMP lipid profile and A1c  Follow-up with various consultants as noted above  Continue home blood sugar monitoring  Try to follow diabetic diet  Exercise regularly  Follow-up pending above and call as needed

## 2023-11-30 ENCOUNTER — TELEPHONE (OUTPATIENT)
Dept: PAIN MANAGEMENT | Age: 68
End: 2023-11-30

## 2023-11-30 NOTE — TELEPHONE ENCOUNTER
REFERRAL # [id number]    MARIBEL [id number] CHACHO    AUTH FROM 11/29/23-5/27/24    OK to schedule procedure approved as above. Please note sides/levels approved and date range.    (If applicable, sides/levels approved may differ from those ordered)    TO BE SCHEDULED WITH DR Joy Balderas

## 2023-12-03 DIAGNOSIS — Z98.61 CAD S/P PERCUTANEOUS CORONARY ANGIOPLASTY: Primary | ICD-10-CM

## 2023-12-03 DIAGNOSIS — I25.10 CAD S/P PERCUTANEOUS CORONARY ANGIOPLASTY: Primary | ICD-10-CM

## 2023-12-04 ENCOUNTER — PROCEDURE VISIT (OUTPATIENT)
Dept: PAIN MANAGEMENT | Age: 68
End: 2023-12-04

## 2023-12-04 DIAGNOSIS — M47.812 CERVICAL SPONDYLOSIS WITHOUT MYELOPATHY: ICD-10-CM

## 2023-12-04 RX ORDER — CLOPIDOGREL BISULFATE 75 MG/1
75 TABLET ORAL DAILY
Qty: 90 TABLET | Refills: 0 | Status: SHIPPED | OUTPATIENT
Start: 2023-12-04 | End: 2024-03-04

## 2023-12-04 RX ORDER — LIDOCAINE HYDROCHLORIDE 10 MG/ML
3 INJECTION, SOLUTION EPIDURAL; INFILTRATION; INTRACAUDAL; PERINEURAL ONCE
Status: COMPLETED | OUTPATIENT
Start: 2023-12-04 | End: 2023-12-04

## 2023-12-04 RX ADMIN — LIDOCAINE HYDROCHLORIDE 3 ML: 10 INJECTION, SOLUTION EPIDURAL; INFILTRATION; INTRACAUDAL; PERINEURAL at 10:21

## 2023-12-04 NOTE — PROGRESS NOTES
[facility] ([facility]) Physicians  Neurosurgery and Pain [facility]  [address] [place], [address] [address] [facility], [address] [place] [address] [place]: [phone]  F: [phone]        [address]    Provider: Dylon Montes DO          Patient Name: Chiquita Baxter :         Date: 2023         Chiquita Baxter is here today for interventional pain management. Sterile technique was used. Fluoroscopic guidance was used. Patient positioned in prone position. Area was cleaned with Betadine. Informed consent was obtained. Appropriate length spinal needle was advanced to the anatomic location of the medial branch at the lateral mass utilizing intermittent fluoroscopy. Approximately 5mg Dexamethasone was divided equally at each level and 0.5cc of 1% PF Lidocaine was injected at each anatomic level without difficulty. Patient tolerated the procedure well, no obvious complications occurred during the procedure. Patient was appropriately monitored and discharged home in stable condition with their usual motor strength. The patient will keep close track of pain over the next several hours and call our office tomorrow and let us know what percentage of pain relief is experienced. Post op Instructions were given to patient. If on blood thiners patient was told to resume them post-procedure. Relevant and recent imaging reviewed with patient today. [x] Bilateral [] C2 [] C3      [] C4 [x] C5     [] Right [x] C6 [x] C7            [] Left                                      Patient had >80% pain relief following procedure with positive facet joint provocativemaneuvers, schedule RF ablation.       Dylon Montes DO

## 2023-12-05 ENCOUNTER — TELEPHONE (OUTPATIENT)
Dept: PAIN MANAGEMENT | Age: 68
End: 2023-12-05

## 2023-12-05 RX ORDER — ERGOCALCIFEROL 1.25 MG/1
1 CAPSULE ORAL
Refills: 0 | OUTPATIENT
Start: 2023-12-05

## 2023-12-05 NOTE — TELEPHONE ENCOUNTER
REFERRAL # [id number]    RIGHT C567 RFA    AUTH FROM 12/5/23-6/2/24    OK to schedule procedure approved as above. Please note sides/levels approved and date range.    (If applicable, sides/levels approved may differ from those ordered)    TO BE SCHEDULED WITH DR Acharya Breath

## 2023-12-06 ENCOUNTER — OFFICE VISIT (OUTPATIENT)
Dept: UROLOGY | Facility: CLINIC | Age: 68
End: 2023-12-06
Payer: MEDICARE

## 2023-12-06 VITALS
WEIGHT: 221 LBS | HEIGHT: 65 IN | HEART RATE: 67 BPM | SYSTOLIC BLOOD PRESSURE: 140 MMHG | TEMPERATURE: 95.7 F | BODY MASS INDEX: 36.82 KG/M2 | DIASTOLIC BLOOD PRESSURE: 71 MMHG

## 2023-12-06 DIAGNOSIS — R35.0 FREQUENCY OF URINATION: ICD-10-CM

## 2023-12-06 DIAGNOSIS — N95.8 GENITOURINARY SYNDROME OF MENOPAUSE: ICD-10-CM

## 2023-12-06 DIAGNOSIS — L29.2 VULVAR ITCHING: ICD-10-CM

## 2023-12-06 DIAGNOSIS — N28.9 RENAL INSUFFICIENCY: Primary | ICD-10-CM

## 2023-12-06 LAB
POC APPEARANCE, URINE: ABNORMAL
POC BILIRUBIN, URINE: NEGATIVE
POC BLOOD, URINE: ABNORMAL
POC COLOR, URINE: YELLOW
POC GLUCOSE, URINE: ABNORMAL MG/DL
POC KETONES, URINE: NEGATIVE MG/DL
POC LEUKOCYTES, URINE: ABNORMAL
POC NITRITE,URINE: NEGATIVE
POC PH, URINE: 5.5 PH
POC PROTEIN, URINE: NEGATIVE MG/DL
POC SPECIFIC GRAVITY, URINE: 1.01
POC UROBILINOGEN, URINE: 0.2 EU/DL

## 2023-12-06 PROCEDURE — 1159F MED LIST DOCD IN RCRD: CPT | Performed by: NURSE PRACTITIONER

## 2023-12-06 PROCEDURE — 3077F SYST BP >= 140 MM HG: CPT | Performed by: NURSE PRACTITIONER

## 2023-12-06 PROCEDURE — 1160F RVW MEDS BY RX/DR IN RCRD: CPT | Performed by: NURSE PRACTITIONER

## 2023-12-06 PROCEDURE — 81003 URINALYSIS AUTO W/O SCOPE: CPT | Performed by: NURSE PRACTITIONER

## 2023-12-06 PROCEDURE — 51798 US URINE CAPACITY MEASURE: CPT | Performed by: NURSE PRACTITIONER

## 2023-12-06 PROCEDURE — 3008F BODY MASS INDEX DOCD: CPT | Performed by: NURSE PRACTITIONER

## 2023-12-06 PROCEDURE — 3078F DIAST BP <80 MM HG: CPT | Performed by: NURSE PRACTITIONER

## 2023-12-06 PROCEDURE — 99213 OFFICE O/P EST LOW 20 MIN: CPT | Performed by: NURSE PRACTITIONER

## 2023-12-06 PROCEDURE — 4010F ACE/ARB THERAPY RXD/TAKEN: CPT | Performed by: NURSE PRACTITIONER

## 2023-12-06 PROCEDURE — 1036F TOBACCO NON-USER: CPT | Performed by: NURSE PRACTITIONER

## 2023-12-06 RX ORDER — CLOTRIMAZOLE AND BETAMETHASONE DIPROPIONATE 10; .64 MG/G; MG/G
CREAM TOPICAL
Qty: 15 G | Refills: 0 | Status: SHIPPED | OUTPATIENT
Start: 2023-12-06

## 2023-12-06 RX ORDER — OXYBUTYNIN CHLORIDE 5 MG/1
5 TABLET, EXTENDED RELEASE ORAL DAILY
Qty: 90 TABLET | Refills: 3 | Status: SHIPPED | OUTPATIENT
Start: 2023-12-06 | End: 2024-12-05

## 2023-12-06 RX ORDER — ESTRADIOL 0.1 MG/G
CREAM VAGINAL
Qty: 42.5 G | Refills: 5 | Status: SHIPPED | OUTPATIENT
Start: 2023-12-06 | End: 2024-12-05

## 2023-12-06 NOTE — PROGRESS NOTES
"12/06/23   [id number]    Med response, pelvic exam (deferred), vulvar itching;      Subjective      HPI Catherine Patel is a 68 y.o. female who presents for medication response and pelvic exam (deferred).  Didn't feel she was ready today for exam; Overall, 80% improvement in bladder symptoms, getting up dry in a.m. can't get to bathroom most of time without wetting self; one pad for security some days, other times maybe change a pad; dry mouth no worse, no constipation; Metamucil gummies manages well;     Working on DM, high glucose 501 this morning, per patient dessert last night; planning blood work next few days; Last visit UA neg, today cloudy trace heme, small leuk, denies symptoms of UTI, some vulvar itching, felt related to pad; very dry;     no hematuria; no UTI, reviewed urine last year did not show glucose;      CT 8/2022 no urolithiasis or obstructive uropathy; no renal masses;      The CT in Aug 2022 was done after MARKO showed possible stone;       Objective     /71   Pulse 67   Temp 35.4 °C (95.7 °F)   Ht 1.651 m (5' 5\")   Wt 100 kg (221 lb)   BMI 36.78 kg/m²    Physical Exam  General: Appears comfortable and in no apparent distress, well nourished  Head: Normocephalic, atraumatic  Neck: trachea midline  Respiratory: respirations unlabored, no wheezes, and no use of accessory muscles  Cardiovascular: at rest no dyspnea, well perfused  Skin: no visible rashes or lesions  Neurologic: grossly intact, oriented to person, place, and time  Psychiatric: mood and affect appropriate  Musculoskeletal: in chair for appt. no difficulty w upper body movement      Assessment/Plan   Problem List Items Addressed This Visit          Genitourinary and Reproductive    Renal insufficiency - Primary    Relevant Orders    POCT UA Automated manually resulted (Completed)    Post-Void Residual (Completed)     Other Visit Diagnoses       Frequency of urination        Relevant Medications    oxybutynin XL (Ditropan XL) 5 " mg 24 hr tablet    Other Relevant Orders    Post-Void Residual (Completed)    Genitourinary syndrome of menopause        Relevant Medications    estradiol (Estrace) 0.01 % (0.1 mg/gram) vaginal cream    Vulvar itching        Relevant Medications    clotrimazole-betamethasone (Lotrisone) cream          Orders Placed This Encounter   Procedures    Post-Void Residual    POCT UA Automated manually resulted     Order Specific Question:   Release result to [facility]     Answer:   Immediate [1]      Plan:  Oxybutynin ER 5 mg daily, discussed constipation prevention  Psyllium fiber (metamucil) or colace   Lotrisone cream twice daily x 2 weeks then taper off  Estrogen cream pea size amount nightly x 2 weeks then 2-3 x per week  Vulvar /pelvic exam next visit 3 mos  Nurse line [phone]       ISIDORO Narayanan-CNP  Lab Results   Component Value Date    GLUCOSE 174 (H) 05/31/2022    CALCIUM 9.3 05/31/2022     05/31/2022    K 4.3 05/31/2022    CO2 27 05/31/2022     05/31/2022    BUN 32 (H) 05/31/2022    CREATININE 1.88 (H) 05/31/2022

## 2023-12-06 NOTE — PATIENT INSTRUCTIONS
Plan:  Oxybutynin ER 5 mg daily, discussed constipation prevention  Psyllium fiber (metamucil) or colace   Lotrisone cream twice daily x 2 weeks then taper off  Estrogen cream pea size amount nightly x 2 weeks then 2-3 x per week  Vulvar /pelvic exam next visit 3 mos  Nurse line [phone]

## 2023-12-12 ENCOUNTER — OFFICE VISIT (OUTPATIENT)
Dept: PAIN MANAGEMENT | Age: 68
End: 2023-12-12
Payer: MEDICARE

## 2023-12-12 DIAGNOSIS — M47.812 CERVICAL SPONDYLOSIS WITHOUT MYELOPATHY: ICD-10-CM

## 2023-12-12 PROCEDURE — 64634 DESTROY C/TH FACET JNT ADDL: CPT | Performed by: PAIN MEDICINE

## 2023-12-12 PROCEDURE — 64633 DESTROY CERV/THOR FACET JNT: CPT | Performed by: PAIN MEDICINE

## 2023-12-12 RX ORDER — DEXAMETHASONE SODIUM PHOSPHATE 10 MG/ML
10 INJECTION, SOLUTION INTRAMUSCULAR; INTRAVENOUS ONCE
Status: COMPLETED | OUTPATIENT
Start: 2023-12-12 | End: 2023-12-12

## 2023-12-12 RX ORDER — LIDOCAINE HYDROCHLORIDE 10 MG/ML
3 INJECTION, SOLUTION EPIDURAL; INFILTRATION; INTRACAUDAL; PERINEURAL ONCE
Status: COMPLETED | OUTPATIENT
Start: 2023-12-12 | End: 2023-12-12

## 2023-12-12 RX ADMIN — DEXAMETHASONE SODIUM PHOSPHATE 10 MG: 10 INJECTION, SOLUTION INTRAMUSCULAR; INTRAVENOUS at 10:36

## 2023-12-12 RX ADMIN — LIDOCAINE HYDROCHLORIDE 3 ML: 10 INJECTION, SOLUTION EPIDURAL; INFILTRATION; INTRACAUDAL; PERINEURAL at 10:36

## 2023-12-12 NOTE — PROGRESS NOTES
Procedure: B c567 cervical radiofrequency medial branch ablation using fluoroscopic needle guidance. Timeout taken to identify correct patient, procedure and side. Patient lying in the prone position the patient was prepped and draped in the usual sterile fashion using Betadine. The levels were determined under fluoroscopy. 1% preservative-free lidocaine was used to numb the skin using a 27-gauge 1-1/2 inch needle. Radiofrequency needle was introduced to the anatomic location of the medial branch at the lateral masses utilizing intermittent fluoroscopy. Motor stimulation up to 2 V was done to confirm no ablation of the ventral ramus at each level. Prior to lesioning at [address] [place] [address] for 90 seconds 1.5 mL of 1% preservative-free lidocaine along with 10 mg dexamethasone preservative-free was divided equally amongst the levels and injected with negative aspiration. This was done at each level. The procedure was tolerated well without complications. The patient was monitored after procedure, had their usual motor strength. And discharged home in stable condition. Patient will ice the area and  take it easy. All questions answered, chart was reviewed.

## 2024-01-02 DIAGNOSIS — G89.4 CHRONIC PAIN SYNDROME: ICD-10-CM

## 2024-01-02 RX ORDER — TRAMADOL HYDROCHLORIDE 50 MG/1
50 TABLET ORAL 2 TIMES DAILY PRN
Qty: 14 TABLET | Refills: 4 | Status: SHIPPED | OUTPATIENT
Start: 2024-01-02 | End: 2024-02-01

## 2024-01-02 NOTE — TELEPHONE ENCOUNTER
Requested Prescriptions     Pending Prescriptions Disp Refills    traMADol (ULTRAM) 50 MG tablet [Pharmacy Med Name: TRAMADOL HCL 50 MG TABLET] 14 tablet 4     Sig: Take 1 tablet by mouth 2 times daily as needed for Pain for up to 30 days. Max Daily Amount: 100 mg

## 2024-01-18 DIAGNOSIS — E78.2 MIXED HYPERLIPIDEMIA: ICD-10-CM

## 2024-01-18 DIAGNOSIS — E11.69 TYPE 2 DIABETES MELLITUS WITH OTHER SPECIFIED COMPLICATION (MULTI): ICD-10-CM

## 2024-01-18 NOTE — TELEPHONE ENCOUNTER
Patient calling in requesting lab orders placed and sent to her home address before her next appt

## 2024-01-19 RX ORDER — GLIMEPIRIDE 2 MG/1
2 TABLET ORAL 2 TIMES DAILY
Qty: 180 TABLET | Refills: 0 | Status: SHIPPED | OUTPATIENT
Start: 2024-01-19 | End: 2024-04-05

## 2024-01-22 ENCOUNTER — APPOINTMENT (OUTPATIENT)
Dept: PRIMARY CARE | Facility: CLINIC | Age: 69
End: 2024-01-22
Payer: MEDICARE

## 2024-01-29 ENCOUNTER — APPOINTMENT (OUTPATIENT)
Dept: CARDIOLOGY | Facility: CLINIC | Age: 69
End: 2024-01-29
Payer: MEDICARE

## 2024-02-12 ENCOUNTER — APPOINTMENT (OUTPATIENT)
Dept: CARDIOLOGY | Facility: CLINIC | Age: 69
End: 2024-02-12
Payer: MEDICARE

## 2024-02-13 ENCOUNTER — APPOINTMENT (OUTPATIENT)
Dept: NEPHROLOGY | Facility: CLINIC | Age: 69
End: 2024-02-13
Payer: MEDICARE

## 2024-02-19 ENCOUNTER — TELEMEDICINE (OUTPATIENT)
Dept: PRIMARY CARE | Facility: CLINIC | Age: 69
End: 2024-02-19
Payer: MEDICARE

## 2024-02-19 DIAGNOSIS — H60.92 OTITIS EXTERNA OF LEFT EAR, UNSPECIFIED CHRONICITY, UNSPECIFIED TYPE: ICD-10-CM

## 2024-02-19 DIAGNOSIS — J01.90 ACUTE NON-RECURRENT SINUSITIS, UNSPECIFIED LOCATION: Primary | ICD-10-CM

## 2024-02-19 PROCEDURE — 4010F ACE/ARB THERAPY RXD/TAKEN: CPT | Performed by: INTERNAL MEDICINE

## 2024-02-19 PROCEDURE — 3008F BODY MASS INDEX DOCD: CPT | Performed by: INTERNAL MEDICINE

## 2024-02-19 PROCEDURE — 99441 PR PHYS/QHP TELEPHONE EVALUATION 5-10 MIN: CPT | Performed by: INTERNAL MEDICINE

## 2024-02-19 PROCEDURE — 1036F TOBACCO NON-USER: CPT | Performed by: INTERNAL MEDICINE

## 2024-02-19 RX ORDER — DOXYCYCLINE 100 MG/1
100 CAPSULE ORAL 2 TIMES DAILY
Qty: 14 CAPSULE | Refills: 0 | Status: SHIPPED | OUTPATIENT
Start: 2024-02-19 | End: 2024-02-26

## 2024-02-19 RX ORDER — CIPROFLOXACIN AND DEXAMETHASONE 3; 1 MG/ML; MG/ML
4 SUSPENSION/ DROPS AURICULAR (OTIC) 2 TIMES DAILY
Qty: 7.5 ML | Refills: 0 | Status: SHIPPED | OUTPATIENT
Start: 2024-02-19 | End: 2024-02-26

## 2024-02-19 NOTE — PROGRESS NOTES
Assessment/Plan   Problem List Items Addressed This Visit       Acute sinusitis - Primary    Relevant Medications    doxycycline (Vibramycin) 100 mg capsule    Otitis externa of left ear    Relevant Medications    ciprofloxacin-dexamethasone (CiproDEX) otic suspension   Discuss ENT referral and she said it is first time that the ear has been affected and I see that her intention to get some treatment  I was unable to examine because this was a telephone call  So in view of his description we consider it is acute sinusitis and otitis externa of the left ear  Therefore oral antibiotic and eardrop as prescribed  She already takes Flonase nasal spray as well as the antihistamines for allergic rhinitis which she may continue    Subjective   Patient ID: Catherine Patel is a 68 y.o. female who presents for No chief complaint on file..    Past Surgical History:   Procedure Laterality Date    MR HEAD ANGIO WO IV CONTRAST  3/7/2020    MR HEAD ANGIO WO IV CONTRAST 3/7/2020 [facility]    MR NECK ANGIO WO IV CONTRAST  3/7/2020    MR NECK ANGIO WO IV CONTRAST 3/7/2020 [facility]    OTHER SURGICAL HISTORY  2022    Eye surgery    OTHER SURGICAL HISTORY  2022    Appendectomy    OTHER SURGICAL HISTORY  2022    Cyst excision    OTHER SURGICAL HISTORY  2022    Cardiac catheterization with stent placement    OTHER SURGICAL HISTORY  2022    Tonsillectomy with adenoidectomy    OTHER SURGICAL HISTORY  2022    Esophagogastroduodenoscopy    OTHER SURGICAL HISTORY  2022    Ankle surgery    OTHER SURGICAL HISTORY  2022     section    OTHER SURGICAL HISTORY  11/10/2022    Complete colonoscopy      Family History   Problem Relation Name Age of Onset    Arthritis Mother      Hypertension Mother      Lung cancer Mother      COPD Father      Other (CARDIAC DISORDER) Father      Hyperlipidemia Father      Hypertension Father      Diabetes Father      Other (MESONEPHROMA,  BENIGN) Father      Colonic polyp Sister      Thyroid cancer Sister      Other (HEART VALVE REPLACED) Father's Sister        Social History     Socioeconomic History    Marital status:      Spouse name: Not on file    Number of children: Not on file    Years of education: Not on file    Highest education level: Not on file   Occupational History    Not on file   Tobacco Use    Smoking status: Former     Types: Cigarettes    Smokeless tobacco: Never   Substance and Sexual Activity    Alcohol use: Not Currently    Drug use: Never    Sexual activity: Not on file   Other Topics Concern    Not on file   Social History Narrative    Not on file     Social Determinants of Health     Financial Resource Strain: Not on file   Food Insecurity: Not on file   Transportation Needs: Not on file   Physical Activity: Not on file   Stress: Not on file   Social Connections: Not on file   Intimate Partner Violence: Not on file   Housing Stability: Not on file      Erythromycin, Hydroxychloroquine, Penicillins, Adhesive tape-silicones, Amlodipine, and Atorvastatin   Current Outpatient Medications   Medication Sig Dispense Refill    albuterol 90 mcg/actuation inhaler Inhale. TAKE PER DIRECTED 1-2 PUFFS EVERY 4-6 HRS      allopurinol (Zyloprim) 100 mg tablet Take 1 tablet (100 mg) by mouth once daily.      calcium carbonate-vitamin D3 (Calcium 600 + D,3,) 600 mg-10 mcg (400 unit) tablet Take 1 tablet by mouth once daily.      ciprofloxacin-dexamethasone (CiproDEX) otic suspension Administer 4 drops into the left ear 2 times a day for 7 days. 7.5 mL 0    clopidogrel (Plavix) 75 mg tablet TAKE 1 TABLET DAILY 90 tablet 0    clotrimazole-betamethasone (Lotrisone) cream Twice daily to area of irritation x 2 weeks, then 2-3 x per week 15 g 0    cyanocobalamin (Vitamin B-12) 1,000 mcg tablet Take 1 tablet (1,000 mcg) by mouth once daily. TAKE PER DIRECTED      dapagliflozin propanediol (Farxiga) 10 mg Take 1 tablet (10 mg) by mouth once  daily in the morning. Take before meals.      dicyclomine (Bentyl) 20 mg tablet Take 1 tablet (20 mg) by mouth 3 times a day. TAKE PER DIRECTED      doxycycline (Vibramycin) 100 mg capsule Take 1 capsule (100 mg) by mouth 2 times a day for 7 days. Take with at least 8 ounces (large glass) of water, do not lie down for 30 minutes after 14 capsule 0    estradiol (Estrace) 0.01 % (0.1 mg/gram) vaginal cream Apply nightly 1 gram for 2 weeks, then 3 times per week. 42.5 g 5    fexofenadine (Allegra) 180 mg tablet Take 1 tablet (180 mg) by mouth once daily. TAKE PER DIRECTED      fluticasone (Flonase) 50 mcg/actuation nasal spray Administer 2 sprays into affected nostril(s) once daily. TAKE PER DIRECTED      furosemide (Lasix) 20 mg tablet TAKE 1 TABLET BY MOUTH TWICE A  tablet 3    gabapentin (Neurontin) 300 mg capsule TAKE 2 CAPSULES BY MOUTH 3 TIMES A  capsule 0    glimepiride (Amaryl) 2 mg tablet TAKE 1 TABLET BY MOUTH TWICE A DAY AS DIRECTED 180 tablet 0    ipratropium-albuteroL (Duo-Neb) 0.5-2.5 mg/3 mL nebulizer solution Inhale. TAKE PER DIRECTED      Januvia 50 mg tablet Take 1 tablet (50 mg) by mouth. TAKE PER DIRECTED      lisinopril 20 mg tablet Take 1 tablet (20 mg) by mouth 2 times a day.      loperamide (Imodium A-D) 2 mg capsule Take 1 capsule (2 mg) by mouth every other day. TAKE PER DIRECTED      meclizine (Antivert) 25 mg tablet Take 1 tablet (25 mg) by mouth 3 times a day as needed.      metoprolol succinate XL (Toprol-XL) 50 mg 24 hr tablet TAKE 1 TABLET IN AM AND 1/2 TABLET IN PM FOR TOTAL OF 75MG DAILY 135 tablet 1    nitroglycerin (Nitrostat) 0.4 mg SL tablet Place 1 tablet (0.4 mg) under the tongue every 5 minutes if needed for chest pain.      oxybutynin XL (Ditropan XL) 5 mg 24 hr tablet Take 1 tablet (5 mg) by mouth once daily. Do not crush, chew, or split. 90 tablet 3    pantoprazole (ProtoNix) 40 mg EC tablet Take 1 tablet (40 mg) by mouth 2 times a day.      potassium chloride  "CR 10 mEq ER tablet Take 1 tablet (10 mEq) by mouth once daily.      propranolol LA (Inderal LA) 60 mg 24 hr capsule Take 1 capsule (60 mg) by mouth once daily.      sertraline (Zoloft) 100 mg tablet TAKE 1 & 1/2 TABLETS BY MOUTH EVERY  tablet 1    simvastatin (Zocor) 20 mg tablet Take 1 tablet (20 mg) by mouth once daily.      Spiriva Respimat 1.25 mcg/actuation inhaler Inhale 2 puffs once daily.      Wixela Inhub 250-50 mcg/dose diskus inhaler Inhale 1 puff twice a day. TAKE PER DIRECTED       No current facility-administered medications for this visit.      There were no vitals filed for this visit.   Problem List Items Addressed This Visit       Acute sinusitis - Primary    Relevant Medications    doxycycline (Vibramycin) 100 mg capsule    Otitis externa of left ear    Relevant Medications    ciprofloxacin-dexamethasone (CiproDEX) otic suspension      No orders of the defined types were placed in this encounter.         HPI  This is a pleasant 68-year-old female who was responded on the telephone and presented with issue there all night she has some intermittent some bleeding from the left ear and discomfort she also says for 1 week she has been having congestion nasal problems postnasal drip facial pressure and gunk coming out which is consistent with sinusitis she feels    ROS there is no fever there is no shortness of breath she has a history of COPD coronary artery disease and all the medical problems where otherwise stable  Diabetes is less than perfect blood sugar is around 100 we discussed the management as well  Past medical history reviewed  Social and family history reviewed  Allergies and medications reviewed  Recent labs reviewed  Vital signs reviewed    PHYSICAL EXAM  Limited by the Telephone Call She Was Awake Alert Orientated No Acute Distress Normal Is Speaking      No results found for: \"PR1\", \"BMPR1A\", \"CMPLAS\", \"MY9MYMOF\", \"KPSAT\"   Lab Results   Component Value Date    CHOL 147 " 05/31/2022    [facility] 4.2 05/31/2022

## 2024-02-26 ENCOUNTER — OFFICE VISIT (OUTPATIENT)
Dept: PRIMARY CARE | Facility: CLINIC | Age: 69
End: 2024-02-26
Payer: MEDICARE

## 2024-02-26 VITALS — DIASTOLIC BLOOD PRESSURE: 62 MMHG | SYSTOLIC BLOOD PRESSURE: 107 MMHG | HEART RATE: 68 BPM | TEMPERATURE: 97.1 F

## 2024-02-26 DIAGNOSIS — E78.2 DM TYPE 2 WITH DIABETIC MIXED HYPERLIPIDEMIA (MULTI): ICD-10-CM

## 2024-02-26 DIAGNOSIS — Z87.891 FORMER SMOKER: ICD-10-CM

## 2024-02-26 DIAGNOSIS — N18.30 STAGE 3 CHRONIC KIDNEY DISEASE, UNSPECIFIED WHETHER STAGE 3A OR 3B CKD (MULTI): ICD-10-CM

## 2024-02-26 DIAGNOSIS — E66.9 CLASS 2 OBESITY WITH BODY MASS INDEX (BMI) OF 36.0 TO 36.9 IN ADULT, UNSPECIFIED OBESITY TYPE, UNSPECIFIED WHETHER SERIOUS COMORBIDITY PRESENT: ICD-10-CM

## 2024-02-26 DIAGNOSIS — E78.2 MIXED HYPERLIPIDEMIA: ICD-10-CM

## 2024-02-26 DIAGNOSIS — I10 ESSENTIAL HYPERTENSION: ICD-10-CM

## 2024-02-26 DIAGNOSIS — E11.69 DM TYPE 2 WITH DIABETIC MIXED HYPERLIPIDEMIA (MULTI): ICD-10-CM

## 2024-02-26 DIAGNOSIS — E79.0 HYPERURICEMIA: ICD-10-CM

## 2024-02-26 PROCEDURE — 1160F RVW MEDS BY RX/DR IN RCRD: CPT | Performed by: FAMILY MEDICINE

## 2024-02-26 PROCEDURE — 1159F MED LIST DOCD IN RCRD: CPT | Performed by: FAMILY MEDICINE

## 2024-02-26 PROCEDURE — 3008F BODY MASS INDEX DOCD: CPT | Performed by: FAMILY MEDICINE

## 2024-02-26 PROCEDURE — 3074F SYST BP LT 130 MM HG: CPT | Performed by: FAMILY MEDICINE

## 2024-02-26 PROCEDURE — 99213 OFFICE O/P EST LOW 20 MIN: CPT | Performed by: FAMILY MEDICINE

## 2024-02-26 PROCEDURE — 3078F DIAST BP <80 MM HG: CPT | Performed by: FAMILY MEDICINE

## 2024-02-26 PROCEDURE — 1036F TOBACCO NON-USER: CPT | Performed by: FAMILY MEDICINE

## 2024-02-26 PROCEDURE — 4010F ACE/ARB THERAPY RXD/TAKEN: CPT | Performed by: FAMILY MEDICINE

## 2024-02-26 RX ORDER — DIAPER,BRIEF,ADULT, DISPOSABLE
1 EACH MISCELLANEOUS DAILY
Qty: 100 STRIP | Refills: 3 | Status: SHIPPED | OUTPATIENT
Start: 2024-02-26 | End: 2024-04-09 | Stop reason: ALTCHOICE

## 2024-02-26 ASSESSMENT — ENCOUNTER SYMPTOMS
CARDIOVASCULAR NEGATIVE: 1
GASTROINTESTINAL NEGATIVE: 1
EYES NEGATIVE: 1
NEUROLOGICAL NEGATIVE: 1
ENDOCRINE NEGATIVE: 1
COUGH: 1
MUSCULOSKELETAL NEGATIVE: 1
SINUS PRESSURE: 1
CONSTITUTIONAL NEGATIVE: 1
PSYCHIATRIC NEGATIVE: 1
HEMATOLOGIC/LYMPHATIC NEGATIVE: 1
ALLERGIC/IMMUNOLOGIC NEGATIVE: 1

## 2024-02-26 NOTE — PROGRESS NOTES
Subjective Catherine is here today for follow-up on diabetes hypertension and hyperlipidemia.  She states that overall she has been doing well.  She had an upper respiratory infection about 7 to 10 days ago and is currently on doxycycline and improving with less congestion and cough.  Her home blood sugars have been variable sometimes as low as 200 but when she is ill they can be 400 and above.  She continues on her meds noted.  She sees Dr. Velazco for nephrology care Dr. Cox for cardiology care and Dr. Thompson for pulmonary care.    Patient ID: Catherine Patel is a 68 y.o. female who presents for Follow-up:    Problem List Items Addressed This Visit    None     Past Medical History:   Diagnosis Date   • Body mass index (BMI)40.0-44.9, adult 07/29/2022    BMI 40.0-44.9, adult   • Carpal tunnel syndrome, left upper limb 09/16/2021    Carpal tunnel syndrome of left wrist   • Dizziness and giddiness 09/16/2021    Postural dizziness   • History of falling 09/08/2022    Status post fall   • Localized edema 07/29/2022    Edema leg   • Morbid (severe) obesity due to excess calories (CMS/[facility]) 09/29/2022    Morbid obesity with BMI of 40.0-44.9, adult   • Pain in right hip 01/20/2022    Right hip pain   • Personal history of other endocrine, nutritional and metabolic disease 01/20/2022    History of morbid obesity   • Personal history of other specified conditions 10/23/2021    History of vertigo   • Personal history of other specified conditions 07/13/2022    History of urinary frequency   • Personal history of other specified conditions 08/30/2022    History of edema   • Personal history of other specified conditions 08/30/2022    History of shortness of breath   • Pneumonia, unspecified organism     Pneumonia of right lung due to infectious organism, unspecified part of lung   • Shortness of breath 07/29/2022    Shortness of breath on exertion   • Syncope and collapse 09/16/2021    Near syncope      Past Surgical  History:   Procedure Laterality Date   • MR HEAD ANGIO WO IV CONTRAST  3/7/2020    MR HEAD ANGIO WO IV CONTRAST 3/7/2020 [facility]   • MR NECK ANGIO WO IV CONTRAST  3/7/2020    MR NECK ANGIO WO IV CONTRAST 3/7/2020 [facility]   • OTHER SURGICAL HISTORY  2022    Eye surgery   • OTHER SURGICAL HISTORY  2022    Appendectomy   • OTHER SURGICAL HISTORY  2022    Cyst excision   • OTHER SURGICAL HISTORY  2022    Cardiac catheterization with stent placement   • OTHER SURGICAL HISTORY  2022    Tonsillectomy with adenoidectomy   • OTHER SURGICAL HISTORY  2022    Esophagogastroduodenoscopy   • OTHER SURGICAL HISTORY  2022    Ankle surgery   • OTHER SURGICAL HISTORY  2022     section   • OTHER SURGICAL HISTORY  11/10/2022    Complete colonoscopy      Family History   Problem Relation Name Age of Onset   • Arthritis Mother     • Hypertension Mother     • Lung cancer Mother     • COPD Father     • Other (CARDIAC DISORDER) Father     • Hyperlipidemia Father     • Hypertension Father     • Diabetes Father     • Other (MESONEPHROMA, BENIGN) Father     • Colonic polyp Sister     • Thyroid cancer Sister     • Other (HEART VALVE REPLACED) Father's Sister        Social History     Socioeconomic History   • Marital status:      Spouse name: Not on file   • Number of children: Not on file   • Years of education: Not on file   • Highest education level: Not on file   Occupational History   • Not on file   Tobacco Use   • Smoking status: Former     Types: Cigarettes   • Smokeless tobacco: Never   Substance and Sexual Activity   • Alcohol use: Not Currently   • Drug use: Never   • Sexual activity: Not on file   Other Topics Concern   • Not on file   Social History Narrative   • Not on file     Social Determinants of Health     Financial Resource Strain: Not on file   Food Insecurity: Not on file   Transportation Needs: Not on file   Physical Activity: Not on  file   Stress: Not on file   Social Connections: Not on file   Intimate Partner Violence: Not on file   Housing Stability: Not on file      Erythromycin, Hydroxychloroquine, Penicillins, Adhesive tape-silicones, Amlodipine, and Atorvastatin   Current Outpatient Medications   Medication Sig Dispense Refill   • albuterol 90 mcg/actuation inhaler INHALE 1 TO 2 PUFFS BY MOUTH EVERY 4 TO 6 HOURS AS NEEDED 18 g 3   • allopurinol (Zyloprim) 100 mg tablet Take 1 tablet (100 mg) by mouth once daily.     • calcium carbonate-vitamin D3 (Calcium 600 + D,3,) 600 mg-10 mcg (400 unit) tablet Take 1 tablet by mouth once daily.     • ciprofloxacin-dexamethasone (CiproDEX) otic suspension Administer 4 drops into the left ear 2 times a day for 7 days. 7.5 mL 0   • clopidogrel (Plavix) 75 mg tablet TAKE 1 TABLET DAILY 90 tablet 0   • clotrimazole-betamethasone (Lotrisone) cream Twice daily to area of irritation x 2 weeks, then 2-3 x per week 15 g 0   • cyanocobalamin (Vitamin B-12) 1,000 mcg tablet Take 1 tablet (1,000 mcg) by mouth once daily. TAKE PER DIRECTED     • dapagliflozin propanediol (Farxiga) 10 mg Take 1 tablet (10 mg) by mouth once daily in the morning. Take before meals.     • dicyclomine (Bentyl) 20 mg tablet Take 1 tablet (20 mg) by mouth 3 times a day. TAKE PER DIRECTED     • doxycycline (Vibramycin) 100 mg capsule Take 1 capsule (100 mg) by mouth 2 times a day for 7 days. Take with at least 8 ounces (large glass) of water, do not lie down for 30 minutes after 14 capsule 0   • estradiol (Estrace) 0.01 % (0.1 mg/gram) vaginal cream Apply nightly 1 gram for 2 weeks, then 3 times per week. 42.5 g 5   • fexofenadine (Allegra) 180 mg tablet Take 1 tablet (180 mg) by mouth once daily. TAKE PER DIRECTED     • fluticasone (Flonase) 50 mcg/actuation nasal spray Administer 2 sprays into affected nostril(s) once daily. TAKE PER DIRECTED     • furosemide (Lasix) 20 mg tablet TAKE 1 TABLET BY MOUTH TWICE A  tablet 3   •  gabapentin (Neurontin) 300 mg capsule TAKE 2 CAPSULES BY MOUTH 3 TIMES A  capsule 0   • glimepiride (Amaryl) 2 mg tablet TAKE 1 TABLET BY MOUTH TWICE A DAY AS DIRECTED (Patient taking differently: Take 2 tablets (4 mg) by mouth 2 times a day. AS DIRECTED) 180 tablet 0   • ipratropium-albuteroL (Duo-Neb) 0.5-2.5 mg/3 mL nebulizer solution Inhale. TAKE PER DIRECTED     • Januvia 50 mg tablet Take 1 tablet (50 mg) by mouth. TAKE PER DIRECTED     • lisinopril 20 mg tablet Take 1 tablet (20 mg) by mouth once daily.     • loperamide (Imodium A-D) 2 mg capsule Take 1 capsule (2 mg) by mouth once daily as needed. TAKE PER DIRECTED     • meclizine (Antivert) 25 mg tablet Take 1 tablet (25 mg) by mouth 3 times a day as needed.     • metoprolol succinate XL (Toprol-XL) 50 mg 24 hr tablet TAKE 1 TABLET IN AM AND 1/2 TABLET IN PM FOR TOTAL OF 75MG DAILY 135 tablet 1   • nitroglycerin (Nitrostat) 0.4 mg SL tablet Place 1 tablet (0.4 mg) under the tongue every 5 minutes if needed for chest pain.     • oxybutynin XL (Ditropan XL) 5 mg 24 hr tablet Take 1 tablet (5 mg) by mouth once daily. Do not crush, chew, or split. 90 tablet 3   • pantoprazole (ProtoNix) 40 mg EC tablet Take 1 tablet (40 mg) by mouth 2 times a day.     • potassium chloride CR 10 mEq ER tablet Take 1 tablet (10 mEq) by mouth once daily.     • propranolol LA (Inderal LA) 60 mg 24 hr capsule Take 1 capsule (60 mg) by mouth once daily.     • sertraline (Zoloft) 100 mg tablet TAKE 1 & 1/2 TABLETS BY MOUTH EVERY  tablet 1   • simvastatin (Zocor) 20 mg tablet Take 1 tablet (20 mg) by mouth once daily.     • Spiriva Respimat 1.25 mcg/actuation inhaler Inhale 2 puffs once daily.     • Wixela Inhub 250-50 mcg/dose diskus inhaler Inhale 1 puff twice a day. TAKE PER DIRECTED       No current facility-administered medications for this visit.       Immunization History   Administered Date(s) Administered   • Flu vaccine (IIV4), preservative free *Check age/dose*  10/19/2016, 10/03/2017, 10/01/2019   • Flu vaccine, quadrivalent, high-dose, preservative free, age 65y+ (FLUZONE) 12/23/2021, 02/02/2023   • Flu vaccine, quadrivalent, no egg protein, age 6 month or greater (FLUCELVAX) 12/09/2018   • Flu vaccine, quadrivalent, recombinant, preservative free, adult (FLUBLOK) 11/06/2019   • Hepatitis B vaccine, adult (RECOMBIVAX, ENGERIX) 09/13/2010, 10/18/2010, 04/08/2011   • Influenza, Unspecified 09/01/2013, 10/13/2014   • Influenza, injectable, quadrivalent 10/13/2020   • Influenza, seasonal, injectable, preservative free 12/02/2015   • PPD Test 05/16/2011   • Pfizer Purple Cap SARS-CoV-2 04/17/2021, 05/08/2021, 12/23/2021   • Pneumococcal conjugate vaccine, 13-valent (PREVNAR 13) 05/31/2022   • Pneumococcal polysaccharide vaccine, 23-valent, age 2 years and older (PNEUMOVAX 23) 03/11/2016   • Tdap vaccine, age 7 year and older (BOOSTRIX, ADACEL) 10/17/2017   • Zoster vaccine, recombinant, adult (SHINGRIX) 11/06/2019, 10/13/2020, 02/06/2021   • Zoster, Unspecified 10/01/2019        Review of Systems   Constitutional: Negative.    HENT:  Positive for congestion, ear pain and sinus pressure.    Eyes: Negative.    Respiratory:  Positive for cough.    Cardiovascular: Negative.    Gastrointestinal: Negative.    Endocrine: Negative.    Genitourinary: Negative.    Musculoskeletal: Negative.    Skin: Negative.    Allergic/Immunologic: Negative.    Neurological: Negative.    Hematological: Negative.    Psychiatric/Behavioral: Negative.     All other systems reviewed and are negative.       Vitals:    02/26/24 1355   BP: 107/62   Pulse: 68   Temp: 36.2 °C (97.1 °F)     Vitals:      Physical Exam  Constitutional:       General: She is not in acute distress.     Appearance: Normal appearance.   Neurological:      Mental Status: She is alert.   Psychiatric:         Mood and Affect: Mood normal.         Thought Content: Thought content normal.        ASSESSMENT/PLAN: Diabetes mellitus type II  with poor control.  Obtain labs including CMP A1c lipid profile and CBC.  Ophthalmology evaluations are up-to-date.    Hypertension stable.  Continue current meds noted    Hyperlipidemia.  Check lipid profile and continue simvastatin as noted.    Coronary artery disease.  Follow-up with cardiology as scheduled    Chronic kidney disease.  Follow-up with nephrology.  Also check uric acid and PTH level.    COPD managed by pulmonology.  Continue current inhalers as noted.    Sleep apnea    Chronic neck pain.  Follow-up with pain management as scheduled    Follow-up in 4 months pending above and call as needed     Scribe Attestation  By signing my name below, I, Ava Del Cid LPN, Scribe   attest that this documentation has been prepared under the direction and in the presence of Henrry Gallegos MD.

## 2024-03-06 ENCOUNTER — OFFICE VISIT (OUTPATIENT)
Dept: UROLOGY | Facility: CLINIC | Age: 69
End: 2024-03-06
Payer: MEDICARE

## 2024-03-06 VITALS
HEART RATE: 68 BPM | DIASTOLIC BLOOD PRESSURE: 51 MMHG | WEIGHT: 221 LBS | TEMPERATURE: 97.3 F | SYSTOLIC BLOOD PRESSURE: 105 MMHG | BODY MASS INDEX: 36.78 KG/M2

## 2024-03-06 DIAGNOSIS — R39.15 URGENCY OF URINATION: ICD-10-CM

## 2024-03-06 DIAGNOSIS — N95.8 GENITOURINARY SYNDROME OF MENOPAUSE: ICD-10-CM

## 2024-03-06 DIAGNOSIS — N39.46 MIXED STRESS AND URGE URINARY INCONTINENCE: ICD-10-CM

## 2024-03-06 DIAGNOSIS — R35.0 FREQUENCY OF URINATION: Primary | ICD-10-CM

## 2024-03-06 PROCEDURE — 99213 OFFICE O/P EST LOW 20 MIN: CPT | Performed by: NURSE PRACTITIONER

## 2024-03-06 PROCEDURE — 1159F MED LIST DOCD IN RCRD: CPT | Performed by: NURSE PRACTITIONER

## 2024-03-06 PROCEDURE — 1160F RVW MEDS BY RX/DR IN RCRD: CPT | Performed by: NURSE PRACTITIONER

## 2024-03-06 PROCEDURE — 3074F SYST BP LT 130 MM HG: CPT | Performed by: NURSE PRACTITIONER

## 2024-03-06 PROCEDURE — 3008F BODY MASS INDEX DOCD: CPT | Performed by: NURSE PRACTITIONER

## 2024-03-06 PROCEDURE — 3078F DIAST BP <80 MM HG: CPT | Performed by: NURSE PRACTITIONER

## 2024-03-06 PROCEDURE — 1036F TOBACCO NON-USER: CPT | Performed by: NURSE PRACTITIONER

## 2024-03-06 PROCEDURE — 4010F ACE/ARB THERAPY RXD/TAKEN: CPT | Performed by: NURSE PRACTITIONER

## 2024-03-06 NOTE — PATIENT INSTRUCTIONS
Continue Oxybutynin ER 5 mg   Continue estrogen cream, lotrisone if needed  Follow up 6 mos  Nurse line [phone]

## 2024-03-06 NOTE — PROGRESS NOTES
03/06/24   [id number]    Pelvic exam, follow up [facility]     Subjective      HPI Catherine Patel is a 68 y.o. female who presents for pelvic exam, follow up [facility]; managed w Oxybutynin ER 5mg, psyllium fiber, using estrogen cream and lotrisone as needed for itching; happy w oxybutynin, 80-90% improvement no brain fog, no dry mouth, constipation managed w stool softener;     Creatinine 1.81  PVR 0 ml      CT 8/2022 no urolithiasis or obstructive uropathy; no renal masses;      The CT in Aug 2022 was done after MARKO showed possible stone;     History reviewed, planning blood work in next few weeks to eval creatinine    Objective     /51   Pulse 68   Temp 36.3 °C (97.3 °F)   Wt 100 kg (221 lb)   BMI 36.78 kg/m²    Physical Exam  Genitourinary:     Comments: No vulvar lesions, neg Qtip tenderness, mod atrophy  Neg CST lying down, Pos levator ani tightness, no tenderness  No cystocele, no rectocele or uterine prolapse  Non tender ovaries/uterus, difficult exam d/t  body habitus   No rectal exam done        General: Appears comfortable and in no apparent distress, well nourished, obese  Head: Normocephalic, atraumatic  Neck: trachea midline  Respiratory: respirations unlabored, no wheezes, and no use of accessory muscles  Cardiovascular: at rest no dyspnea, well perfused  Skin: no visible rashes or lesions  Neurologic: grossly intact, oriented to person, place, and time  Psychiatric: mood and affect appropriate  Musculoskeletal: in chair for appt. no difficulty w upper body movement      Assessment/Plan   Problem List Items Addressed This Visit    None  Visit Diagnoses       Frequency of urination    -  Primary    Urgency of urination        Mixed stress and urge urinary incontinence        Genitourinary syndrome of menopause              No orders of the defined types were placed in this encounter.     Continue Oxybutynin ER 5 mg   Continue estrogen cream, lotrisone if needed  Follow up 6 mos  Nurse line [phone]        Olimpia Rosas, APRN-CNP  Lab Results   Component Value Date    GLUCOSE 174 (H) 05/31/2022    CALCIUM 9.3 05/31/2022     05/31/2022    K 4.3 05/31/2022    CO2 27 05/31/2022     05/31/2022    BUN 32 (H) 05/31/2022    CREATININE 1.88 (H) 05/31/2022

## 2024-03-21 DIAGNOSIS — J30.9 ALLERGIC RHINITIS, UNSPECIFIED SEASONALITY, UNSPECIFIED TRIGGER: ICD-10-CM

## 2024-03-21 RX ORDER — MINERAL OIL
180 ENEMA (ML) RECTAL DAILY
Qty: 90 TABLET | Refills: 1 | Status: SHIPPED | OUTPATIENT
Start: 2024-03-21

## 2024-03-29 DIAGNOSIS — Z00.00 ENCOUNTER FOR GENERAL ADULT MEDICAL EXAMINATION WITHOUT ABNORMAL FINDINGS: ICD-10-CM

## 2024-03-29 RX ORDER — LISINOPRIL 20 MG/1
20 TABLET ORAL 2 TIMES DAILY
Qty: 180 TABLET | Refills: 1 | Status: SHIPPED | OUTPATIENT
Start: 2024-03-29

## 2024-04-05 ENCOUNTER — LAB (OUTPATIENT)
Dept: LAB | Facility: LAB | Age: 69
End: 2024-04-05
Payer: MEDICARE

## 2024-04-05 DIAGNOSIS — E11.69 DM TYPE 2 WITH DIABETIC MIXED HYPERLIPIDEMIA (MULTI): ICD-10-CM

## 2024-04-05 DIAGNOSIS — E79.0 HYPERURICEMIA: ICD-10-CM

## 2024-04-05 DIAGNOSIS — E79.0 HYPERURICEMIA WITHOUT SIGNS OF INFLAMMATORY ARTHRITIS AND TOPHACEOUS DISEASE: ICD-10-CM

## 2024-04-05 DIAGNOSIS — E11.42 DIABETIC PERIPHERAL NEUROPATHY (MULTI): ICD-10-CM

## 2024-04-05 DIAGNOSIS — E11.69 TYPE 2 DIABETES MELLITUS WITH OTHER SPECIFIED COMPLICATION (MULTI): ICD-10-CM

## 2024-04-05 DIAGNOSIS — N18.30 CHRONIC KIDNEY DISEASE, STAGE 3 UNSPECIFIED (MULTI): ICD-10-CM

## 2024-04-05 DIAGNOSIS — E78.2 DM TYPE 2 WITH DIABETIC MIXED HYPERLIPIDEMIA (MULTI): ICD-10-CM

## 2024-04-05 DIAGNOSIS — E78.2 MIXED HYPERLIPIDEMIA: ICD-10-CM

## 2024-04-05 DIAGNOSIS — I10 ESSENTIAL (PRIMARY) HYPERTENSION: Primary | ICD-10-CM

## 2024-04-05 DIAGNOSIS — E55.9 VITAMIN D DEFICIENCY: Primary | ICD-10-CM

## 2024-04-05 DIAGNOSIS — N18.30 STAGE 3 CHRONIC KIDNEY DISEASE, UNSPECIFIED WHETHER STAGE 3A OR 3B CKD (MULTI): ICD-10-CM

## 2024-04-05 DIAGNOSIS — I10 ESSENTIAL HYPERTENSION: ICD-10-CM

## 2024-04-05 DIAGNOSIS — R39.15 URGENCY OF URINATION: ICD-10-CM

## 2024-04-05 DIAGNOSIS — R35.0 FREQUENCY OF URINATION: ICD-10-CM

## 2024-04-05 LAB
25(OH)D3 SERPL-MCNC: 25 NG/ML (ref 30–100)
ALBUMIN SERPL BCP-MCNC: 4.3 G/DL (ref 3.4–5)
ALP SERPL-CCNC: 91 U/L (ref 33–136)
ALT SERPL W P-5'-P-CCNC: 15 U/L (ref 7–45)
ANION GAP SERPL CALC-SCNC: 15 MMOL/L (ref 10–20)
APPEARANCE UR: CLEAR
AST SERPL W P-5'-P-CCNC: 15 U/L (ref 9–39)
BACTERIA #/AREA URNS AUTO: ABNORMAL /HPF
BILIRUB SERPL-MCNC: 0.4 MG/DL (ref 0–1.2)
BILIRUB UR STRIP.AUTO-MCNC: NEGATIVE MG/DL
BUN SERPL-MCNC: 42 MG/DL (ref 6–23)
CALCIUM SERPL-MCNC: 10 MG/DL (ref 8.6–10.3)
CHLORIDE SERPL-SCNC: 94 MMOL/L (ref 98–107)
CHOLEST SERPL-MCNC: 221 MG/DL (ref 0–199)
CHOLESTEROL/HDL RATIO: 7.1
CO2 SERPL-SCNC: 28 MMOL/L (ref 21–32)
COLOR UR: COLORLESS
CREAT SERPL-MCNC: 2.01 MG/DL (ref 0.5–1.05)
CREAT UR-MCNC: 25.7 MG/DL (ref 20–320)
EGFRCR SERPLBLD CKD-EPI 2021: 27 ML/MIN/1.73M*2
ERYTHROCYTE [DISTWIDTH] IN BLOOD BY AUTOMATED COUNT: 16.6 % (ref 11.5–14.5)
EST. AVERAGE GLUCOSE BLD GHB EST-MCNC: 355 MG/DL
GLUCOSE SERPL-MCNC: 426 MG/DL (ref 74–99)
GLUCOSE UR STRIP.AUTO-MCNC: ABNORMAL MG/DL
HBA1C MFR BLD: 14 %
HCT VFR BLD AUTO: 34.2 % (ref 36–46)
HDLC SERPL-MCNC: 31.2 MG/DL
HGB BLD-MCNC: 10.9 G/DL (ref 12–16)
KETONES UR STRIP.AUTO-MCNC: NEGATIVE MG/DL
LDLC SERPL CALC-MCNC: ABNORMAL MG/DL
LEUKOCYTE ESTERASE UR QL STRIP.AUTO: ABNORMAL
MCH RBC QN AUTO: 26.8 PG (ref 26–34)
MCHC RBC AUTO-ENTMCNC: 31.9 G/DL (ref 32–36)
MCV RBC AUTO: 84 FL (ref 80–100)
NITRITE UR QL STRIP.AUTO: NEGATIVE
NON HDL CHOLESTEROL: 190 MG/DL (ref 0–149)
NRBC BLD-RTO: 0 /100 WBCS (ref 0–0)
PH UR STRIP.AUTO: 6 [PH]
PHOSPHATE SERPL-MCNC: 4.2 MG/DL (ref 2.5–4.9)
PLATELET # BLD AUTO: 295 X10*3/UL (ref 150–450)
POTASSIUM SERPL-SCNC: 4.7 MMOL/L (ref 3.5–5.3)
PROT SERPL-MCNC: 6.7 G/DL (ref 6.4–8.2)
PROT UR STRIP.AUTO-MCNC: NEGATIVE MG/DL
PROT UR-ACNC: 5 MG/DL (ref 5–24)
PROT/CREAT UR: 0.19 MG/MG CREAT (ref 0–0.17)
PTH-INTACT SERPL-MCNC: 95.4 PG/ML (ref 18.5–88)
RBC # BLD AUTO: 4.06 X10*6/UL (ref 4–5.2)
RBC # UR STRIP.AUTO: NEGATIVE /UL
RBC #/AREA URNS AUTO: ABNORMAL /HPF
SODIUM SERPL-SCNC: 132 MMOL/L (ref 136–145)
SP GR UR STRIP.AUTO: 1.01
SQUAMOUS #/AREA URNS AUTO: ABNORMAL /HPF
TRIGL SERPL-MCNC: 666 MG/DL (ref 0–149)
URATE SERPL-MCNC: 6.2 MG/DL (ref 2.3–6.7)
UROBILINOGEN UR STRIP.AUTO-MCNC: <2 MG/DL
VLDL: ABNORMAL
WBC # BLD AUTO: 8.4 X10*3/UL (ref 4.4–11.3)
WBC #/AREA URNS AUTO: ABNORMAL /HPF

## 2024-04-05 PROCEDURE — 85027 COMPLETE CBC AUTOMATED: CPT

## 2024-04-05 PROCEDURE — 84550 ASSAY OF BLOOD/URIC ACID: CPT

## 2024-04-05 PROCEDURE — 83970 ASSAY OF PARATHORMONE: CPT

## 2024-04-05 PROCEDURE — 82306 VITAMIN D 25 HYDROXY: CPT

## 2024-04-05 PROCEDURE — 84156 ASSAY OF PROTEIN URINE: CPT

## 2024-04-05 PROCEDURE — 82570 ASSAY OF URINE CREATININE: CPT

## 2024-04-05 PROCEDURE — 80053 COMPREHEN METABOLIC PANEL: CPT

## 2024-04-05 PROCEDURE — 36415 COLL VENOUS BLD VENIPUNCTURE: CPT

## 2024-04-05 PROCEDURE — 84100 ASSAY OF PHOSPHORUS: CPT

## 2024-04-05 PROCEDURE — 83036 HEMOGLOBIN GLYCOSYLATED A1C: CPT

## 2024-04-05 PROCEDURE — 80061 LIPID PANEL: CPT

## 2024-04-05 PROCEDURE — 81001 URINALYSIS AUTO W/SCOPE: CPT

## 2024-04-05 RX ORDER — ERGOCALCIFEROL 1.25 MG/1
50000 CAPSULE ORAL
Qty: 6 CAPSULE | Refills: 2 | Status: SHIPPED | OUTPATIENT
Start: 2024-04-05

## 2024-04-09 DIAGNOSIS — E08.65 DIABETES MELLITUS DUE TO UNDERLYING CONDITION WITH HYPERGLYCEMIA, WITHOUT LONG-TERM CURRENT USE OF INSULIN (MULTI): ICD-10-CM

## 2024-04-09 RX ORDER — LANCETS
EACH MISCELLANEOUS
Qty: 100 EACH | Refills: 3 | Status: SHIPPED | OUTPATIENT
Start: 2024-04-09

## 2024-04-09 RX ORDER — ISOPROPYL ALCOHOL 70 ML/100ML
1 SWAB TOPICAL DAILY
Qty: 100 EACH | Refills: 3 | Status: SHIPPED | OUTPATIENT
Start: 2024-04-09

## 2024-04-09 RX ORDER — GLIPIZIDE 5 MG/1
TABLET ORAL
Refills: 0 | OUTPATIENT
Start: 2024-04-09

## 2024-04-09 RX ORDER — CONTAINER,EMPTY
1 EACH MISCELLANEOUS DAILY
Qty: 1 EACH | Refills: 3 | Status: SHIPPED | OUTPATIENT
Start: 2024-04-09 | End: 2025-04-09

## 2024-04-09 RX ORDER — GLIMEPIRIDE 4 MG/1
4 TABLET ORAL 2 TIMES DAILY
Qty: 180 TABLET | Refills: 1 | OUTPATIENT
Start: 2024-04-09

## 2024-04-09 RX ORDER — GLIMEPIRIDE 2 MG/1
4 TABLET ORAL 2 TIMES DAILY
Qty: 360 TABLET | Refills: 1 | Status: SHIPPED | OUTPATIENT
Start: 2024-04-09 | End: 2024-04-25 | Stop reason: WASHOUT

## 2024-04-09 RX ORDER — INSULIN PUMP SYRINGE, 3 ML
1 EACH MISCELLANEOUS DAILY
Qty: 1 KIT | Refills: 1 | Status: SHIPPED | OUTPATIENT
Start: 2024-04-09 | End: 2025-04-09

## 2024-04-09 RX ORDER — IBUPROFEN 200 MG
1 CAPSULE ORAL DAILY
Qty: 100 STRIP | Refills: 3 | Status: SHIPPED | OUTPATIENT
Start: 2024-04-09

## 2024-04-09 NOTE — TELEPHONE ENCOUNTER
Spoke with PT who was already aware of results of blood sugar and endo referral.  Message sent to [id number] for follow up on scheduling. PT does need new glucometer and requesting it be sent to [facility]/[address].  Sent to provider for authorization.

## 2024-04-09 NOTE — TELEPHONE ENCOUNTER
----- Message from Henrry Gallegos MD sent at 4/8/2024  7:45 PM EDT -----  Send new meter and supplies.  Referral entered  ----- Message -----  From: Zackery Mccarty MA  Sent: 4/8/2024  11:57 AM EDT  To: Henrry Gallegos MD    Patient is not testing.  She needs anew glucometer and strips.  Please send in.  Also she wants to know hiw many times to test?  Also, please enter referral to see ENDO so Alisia can schedule.  ----- Message -----  From: Henrry Gallegos MD  Sent: 4/8/2024   8:06 AM EDT  To: [id number] Clinical Support Staff    Sugar very high at 426 and A1C 14.  Ask pt what BS readings she is getting at home.  She needs to consult an endocrinologist

## 2024-04-24 DIAGNOSIS — G45.0 VERTEBRO-BASILAR ARTERY SYNDROME: ICD-10-CM

## 2024-04-25 ENCOUNTER — OFFICE VISIT (OUTPATIENT)
Dept: NEPHROLOGY | Facility: CLINIC | Age: 69
End: 2024-04-25
Payer: MEDICARE

## 2024-04-25 VITALS
DIASTOLIC BLOOD PRESSURE: 72 MMHG | HEART RATE: 65 BPM | WEIGHT: 223 LBS | SYSTOLIC BLOOD PRESSURE: 126 MMHG | BODY MASS INDEX: 37.15 KG/M2 | HEIGHT: 65 IN

## 2024-04-25 DIAGNOSIS — R80.8 OTHER PROTEINURIA: ICD-10-CM

## 2024-04-25 DIAGNOSIS — I10 ESSENTIAL HYPERTENSION: ICD-10-CM

## 2024-04-25 DIAGNOSIS — E08.22 DIABETES MELLITUS DUE TO UNDERLYING CONDITION WITH DIABETIC CHRONIC KIDNEY DISEASE, UNSPECIFIED CKD STAGE, UNSPECIFIED WHETHER LONG TERM INSULIN USE (MULTI): ICD-10-CM

## 2024-04-25 DIAGNOSIS — N18.32 STAGE 3B CHRONIC KIDNEY DISEASE (MULTI): Primary | ICD-10-CM

## 2024-04-25 PROCEDURE — 1159F MED LIST DOCD IN RCRD: CPT | Performed by: INTERNAL MEDICINE

## 2024-04-25 PROCEDURE — 3078F DIAST BP <80 MM HG: CPT | Performed by: INTERNAL MEDICINE

## 2024-04-25 PROCEDURE — 3061F NEG MICROALBUMINURIA REV: CPT | Performed by: INTERNAL MEDICINE

## 2024-04-25 PROCEDURE — 3074F SYST BP LT 130 MM HG: CPT | Performed by: INTERNAL MEDICINE

## 2024-04-25 PROCEDURE — 3046F HEMOGLOBIN A1C LEVEL >9.0%: CPT | Performed by: INTERNAL MEDICINE

## 2024-04-25 PROCEDURE — 3008F BODY MASS INDEX DOCD: CPT | Performed by: INTERNAL MEDICINE

## 2024-04-25 PROCEDURE — 1036F TOBACCO NON-USER: CPT | Performed by: INTERNAL MEDICINE

## 2024-04-25 PROCEDURE — 99214 OFFICE O/P EST MOD 30 MIN: CPT | Performed by: INTERNAL MEDICINE

## 2024-04-25 PROCEDURE — 4010F ACE/ARB THERAPY RXD/TAKEN: CPT | Performed by: INTERNAL MEDICINE

## 2024-04-25 PROCEDURE — 1160F RVW MEDS BY RX/DR IN RCRD: CPT | Performed by: INTERNAL MEDICINE

## 2024-04-25 RX ORDER — MECLIZINE HYDROCHLORIDE 25 MG/1
25 TABLET ORAL 3 TIMES DAILY PRN
Qty: 270 TABLET | Refills: 1 | Status: SHIPPED | OUTPATIENT
Start: 2024-04-25

## 2024-04-25 NOTE — PROGRESS NOTES
Catherine SIOMARA Amanda   68 y.o.    @@  [facility]/Room: [id number]/Room/bed info not found    Subjective:   The patient is being seen for a routine clinic follow-up of chronic kidney disease. Recently, the disease has been stable. Disease complications:  No hyperkalemia, no hypocalcemia, no hyperphosphatemia, no metabolic acidosis, no coagulopathy, no uremic encephalopathy, no neuropathy and no renal osteodystrophy. The patient is currently asymptomatic. No associated symptoms are reported.       Meds:   Current Outpatient Medications   Medication Sig Dispense Refill    albuterol 90 mcg/actuation inhaler INHALE 1 TO 2 PUFFS BY MOUTH EVERY 4 TO 6 HOURS AS NEEDED 18 g 3    alcohol swabs (Alcohol Pads) pads, medicated Apply 1 Swab topically once daily. 100 each 3    allopurinol (Zyloprim) 100 mg tablet Take 1 tablet (100 mg) by mouth once daily.      Blood glucose monitoring meter kit (Blood Glucose Monitoring) kit 1 each once daily. Use to test blood sugar once daily and as needed 1 kit 1    blood sugar diagnostic (Blood Glucose Test) strip 1 strip once daily. 100 strip 3    calcium carbonate-vitamin D3 (Calcium 600 + D,3,) 600 mg-10 mcg (400 unit) tablet Take 1 tablet by mouth once daily.      clopidogrel (Plavix) 75 mg tablet TAKE 1 TABLET DAILY 90 tablet 1    clotrimazole-betamethasone (Lotrisone) cream Twice daily to area of irritation x 2 weeks, then 2-3 x per week 15 g 0    cyanocobalamin (Vitamin B-12) 1,000 mcg tablet Take 1 tablet (1,000 mcg) by mouth once daily. TAKE PER DIRECTED      dapagliflozin propanediol (Farxiga) 10 mg Take 1 tablet (10 mg) by mouth once daily in the morning. Take before meals.      dicyclomine (Bentyl) 20 mg tablet Take 1 tablet (20 mg) by mouth 3 times a day. TAKE PER DIRECTED      ergocalciferol (Vitamin D-2) 1.25 MG (63131 UT) capsule Take 1 capsule (50,000 Units) by mouth every 14 (fourteen) days. 6 capsule 2    estradiol (Estrace) 0.01 % (0.1 mg/gram) vaginal cream Apply nightly 1 gram for 2  weeks, then 3 times per week. 42.5 g 5    fexofenadine (Allegra) 180 mg tablet Take 1 tablet (180 mg) by mouth once daily. 90 tablet 1    fluticasone (Flonase) 50 mcg/actuation nasal spray INSTILL 2 SPRAYS INTO EACH NOSTRIL ONCE DAILY 16 g 3    furosemide (Lasix) 20 mg tablet TAKE 1 TABLET BY MOUTH TWICE A  tablet 3    gabapentin (Neurontin) 300 mg capsule TAKE 2 CAPSULES BY MOUTH 3 TIMES A  capsule 1    ipratropium-albuteroL (Duo-Neb) 0.5-2.5 mg/3 mL nebulizer solution Inhale. TAKE PER DIRECTED      Januvia 50 mg tablet Take 1 tablet (50 mg) by mouth. TAKE PER DIRECTED      lisinopril 20 mg tablet TAKE 1 TABLET BY MOUTH TWICE A  tablet 1    loperamide (Imodium A-D) 2 mg capsule Take 1 capsule (2 mg) by mouth once daily as needed. TAKE PER DIRECTED      meclizine (Antivert) 25 mg tablet Take 1 tablet (25 mg) by mouth 3 times a day as needed.      metoprolol succinate XL (Toprol-XL) 50 mg 24 hr tablet TAKE 1 TABLET IN AM AND 1/2 TABLET IN PM FOR TOTAL OF 75MG DAILY 135 tablet 1    nitroglycerin (Nitrostat) 0.4 mg SL tablet Place 1 tablet (0.4 mg) under the tongue every 5 minutes if needed for chest pain.      oxybutynin XL (Ditropan XL) 5 mg 24 hr tablet Take 1 tablet (5 mg) by mouth once daily. Do not crush, chew, or split. 90 tablet 3    pantoprazole (ProtoNix) 40 mg EC tablet Take 1 tablet (40 mg) by mouth 2 times a day.      potassium chloride CR 10 mEq ER tablet TAKE 1 TABLET DAILY 90 tablet 1    propranolol LA (Inderal LA) 60 mg 24 hr capsule Take 1 capsule (60 mg) by mouth once daily.      sertraline (Zoloft) 100 mg tablet TAKE 1 & 1/2 TABLETS BY MOUTH EVERY  tablet 1    Sharps Container 1 each once daily. 1 each 3    simvastatin (Zocor) 20 mg tablet Take 1 tablet (20 mg) by mouth once daily.      Spiriva Respimat 1.25 mcg/actuation inhaler Inhale 2 puffs once daily.      Wixela Inhub 250-50 mcg/dose diskus inhaler TAKE 1 PUFF BY MOUTH TWICE A  each 1    glimepiride (Amaryl)  2 mg tablet Take 2 tablets (4 mg) by mouth 2 times a day. AS DIRECTED (Patient not taking: Reported on 4/25/2024) 360 tablet 1    lancets misc Use to test blood sugar once daily 100 each 3     No current facility-administered medications for this visit.          ROS:  The patient is awake and oriented. No dizziness or lightheadedness. No chills and no fever. No headaches. No nausea and no vomiting. No shortness of breath. No cough. No sputum. No chest pain. No chest tightness. No abdominal pain. No diarrhea and no constipation. No hematemesis or hemoptysis. No hematuria. No rectal bleeding. No melena. No epistaxis. No urinary symptoms. No flank pain. No leg edema. No leg pain. No weakness. No itching. Overall, the rest of the review of systems is also negative.  12 point review of systems otherwise negative as stated in HPI.        Physical Examination:        Vitals:    04/25/24 1213   BP: 126/72   Pulse: 65     General: The patient is awake, oriented, and is not in any distress.  Head and Neck: Normocephalic. No periorbital edema.  Eyes: non-icteric  Respiratory: Symmetric air entry. Symmetric chest expansion.No respiratory distress.  Cardiovascular: Symmetric peripheral pulses.  Skin: No maculopapular rash.  Abdomen: soft, nt/nd  Musculoskeletal: No peripheral edema in both left and right upper extremities.  No edema in either left or right lower extremities.  Neuro Exam: Speech is fluent. Moves extremities.    Imaging:  === 07/07/22 ===    US RENAL COMPLETE    - Impression -  Nonobstructing left nephrolithiasis, as discussed above. No  hydronephrosis bilaterally.       Blood Labs:  No results found for this or any previous visit (from the past 24 hour(s)).   Lab Results   Component Value Date    PTH 95.4 (H) 04/05/2024    PROTUR NEGATIVE 04/05/2024    PROTUR NEGATIVE 12/06/2023    PHOS 4.2 04/05/2024      Lab Results   Component Value Date    GLUCOSE 426 (H) 04/05/2024    CALCIUM 10.0 04/05/2024     (L)  04/05/2024    K 4.7 04/05/2024    CO2 28 04/05/2024    CL 94 (L) 04/05/2024    BUN 42 (H) 04/05/2024    CREATININE 2.01 (H) 04/05/2024         Assessment and Plan:  1. Chronic kidney disease is stage III. Last creatinine level is 2.0. Stable kidney function. Electrolytes are normal. Bicarb level is normal. Volume status is good. Blood pressure is under control. No proteinuria.      2. Hypertension. Pressure is under control. Continue the current medications.     3. Proteinuria. Last spot urine protein to creatinine ratio shows about 190 mg proteinuria. She is on lisinopril and Farxiga.     4. Secondary hyperparathyroidism: with Vit D deficiency. I put her on Ergocalciferol.     I will see her in about 6 months for follow-up.           Robel Velazco MD  Senior Attending Physician  Director of Onco-Nephrology Program  Division of Nephrology & Hypertension  [facility]

## 2024-05-09 DIAGNOSIS — I25.10 ATHEROSCLEROTIC HEART DISEASE OF NATIVE CORONARY ARTERY WITHOUT ANGINA PECTORIS: ICD-10-CM

## 2024-05-09 DIAGNOSIS — Z98.61 CORONARY ANGIOPLASTY STATUS: ICD-10-CM

## 2024-05-10 RX ORDER — METOPROLOL SUCCINATE 50 MG/1
TABLET, EXTENDED RELEASE ORAL
Qty: 135 TABLET | Refills: 1 | Status: SHIPPED | OUTPATIENT
Start: 2024-05-10

## 2024-05-20 ENCOUNTER — HOSPITAL ENCOUNTER (OUTPATIENT)
Dept: RADIOLOGY | Facility: HOSPITAL | Age: 69
Discharge: HOME | End: 2024-05-20
Payer: MEDICARE

## 2024-05-20 DIAGNOSIS — R91.8 OTHER NONSPECIFIC ABNORMAL FINDING OF LUNG FIELD: ICD-10-CM

## 2024-05-20 PROCEDURE — 71250 CT THORAX DX C-: CPT | Performed by: RADIOLOGY

## 2024-05-20 PROCEDURE — 71250 CT THORAX DX C-: CPT

## 2024-05-24 ENCOUNTER — OFFICE VISIT (OUTPATIENT)
Dept: CARDIOLOGY | Facility: CLINIC | Age: 69
End: 2024-05-24
Payer: MEDICARE

## 2024-05-24 VITALS
DIASTOLIC BLOOD PRESSURE: 72 MMHG | BODY MASS INDEX: 35.85 KG/M2 | SYSTOLIC BLOOD PRESSURE: 122 MMHG | HEART RATE: 64 BPM | HEIGHT: 65 IN | WEIGHT: 215.2 LBS

## 2024-05-24 DIAGNOSIS — I25.119 ATHEROSCLEROSIS OF NATIVE CORONARY ARTERY OF NATIVE HEART WITH ANGINA PECTORIS (CMS-HCC): Primary | ICD-10-CM

## 2024-05-24 DIAGNOSIS — I10 ESSENTIAL HYPERTENSION: ICD-10-CM

## 2024-05-24 DIAGNOSIS — R06.02 SHORTNESS OF BREATH: ICD-10-CM

## 2024-05-24 DIAGNOSIS — Z98.61 HISTORY OF PTCA: ICD-10-CM

## 2024-05-24 DIAGNOSIS — I50.32: ICD-10-CM

## 2024-05-24 DIAGNOSIS — I25.10 CAD S/P PERCUTANEOUS CORONARY ANGIOPLASTY: ICD-10-CM

## 2024-05-24 DIAGNOSIS — E78.2 MIXED HYPERLIPIDEMIA: ICD-10-CM

## 2024-05-24 DIAGNOSIS — E78.2 DM TYPE 2 WITH DIABETIC MIXED HYPERLIPIDEMIA (MULTI): ICD-10-CM

## 2024-05-24 DIAGNOSIS — I87.2 CHRONIC VENOUS INSUFFICIENCY: ICD-10-CM

## 2024-05-24 DIAGNOSIS — E11.69 DM TYPE 2 WITH DIABETIC MIXED HYPERLIPIDEMIA (MULTI): ICD-10-CM

## 2024-05-24 DIAGNOSIS — Z98.61 CAD S/P PERCUTANEOUS CORONARY ANGIOPLASTY: ICD-10-CM

## 2024-05-24 PROCEDURE — 1036F TOBACCO NON-USER: CPT | Performed by: INTERNAL MEDICINE

## 2024-05-24 PROCEDURE — 3078F DIAST BP <80 MM HG: CPT | Performed by: INTERNAL MEDICINE

## 2024-05-24 PROCEDURE — 1160F RVW MEDS BY RX/DR IN RCRD: CPT | Performed by: INTERNAL MEDICINE

## 2024-05-24 PROCEDURE — 99214 OFFICE O/P EST MOD 30 MIN: CPT | Performed by: INTERNAL MEDICINE

## 2024-05-24 PROCEDURE — 3008F BODY MASS INDEX DOCD: CPT | Performed by: INTERNAL MEDICINE

## 2024-05-24 PROCEDURE — 1159F MED LIST DOCD IN RCRD: CPT | Performed by: INTERNAL MEDICINE

## 2024-05-24 PROCEDURE — 3061F NEG MICROALBUMINURIA REV: CPT | Performed by: INTERNAL MEDICINE

## 2024-05-24 PROCEDURE — 3046F HEMOGLOBIN A1C LEVEL >9.0%: CPT | Performed by: INTERNAL MEDICINE

## 2024-05-24 PROCEDURE — 3074F SYST BP LT 130 MM HG: CPT | Performed by: INTERNAL MEDICINE

## 2024-05-24 PROCEDURE — 4010F ACE/ARB THERAPY RXD/TAKEN: CPT | Performed by: INTERNAL MEDICINE

## 2024-05-24 RX ORDER — ICOSAPENT ETHYL 1 G/1
1 CAPSULE ORAL
Qty: 180 CAPSULE | Refills: 3 | Status: SHIPPED | OUTPATIENT
Start: 2024-05-24 | End: 2025-05-24

## 2024-05-24 NOTE — PROGRESS NOTES
Patient:  Catherine Patel  YOB: 1955  MRN: [id number]       HPI:       Catherine Patel is a 68 y.o. female who returns today for cardiac follow-up.  She was previously followed on a regular basis by Dr. Peacock. She has a history of atherosclerotic heart disease with remote PCI. She has a history of COPD, hypertension, hyperlipidemia, and type 2 diabetes mellitus. She has a history of chronic venous insufficiency. She has chronic kidney disease for which she sees Dr. Velazco.  She has some chronic shortness of breath. She primarily attributes this to underlying asthma and COPD. She has chronic back pain.     CT scan of the chest May 20, 2024 showed scattered pulmonary opacities similar to December 2022.  1.4 cm thyroid nodule was incidentally noted.  Lab studies April 5, 2024 showed a hemoglobin 10.9 and hematocrit 34.2.  Comprehensive metabolic profile shows sodium 132, BUN 42, creatinine 2.01, and glucose 426.  Hemoglobin A1c 14.0.  Cholesterol 221 with HDL 31, LDL 75, and triglycerides 666.    The patient notes she has been experiencing gradually worsening exertional shortness of breath.  This is progressed to the point where she gets winded after walking 10 to 20 feet.  Her symptoms improve rapidly when she stops and rests.  She notes a mild increase in peripheral edema. She denies any chest pain. She denies any orthopnea, PND, or increasing peripheral edema. She denies any palpitations, lightheadedness, near-syncope, or syncope. She denies any fever, chills, or cough. She denies any nausea, vomiting, or diaphoresis. She denies any hemoptysis, hematemesis, melena, or hematochezia. Lab studies dated July 27, 2023 showed a normal CMP except for BUN 32 and creatinine 1.8. Baseline creatinine about 1.7. Hemoglobin A1C was 14.8. Vitamin D level was 33. She will continue on furosemide 20 mg twice daily.     In light of her symptoms of exertional dyspnea and known ASHD she will be scheduled for a  Lexiscan myocardial perfusion study.  She is unable to ambulate on a treadmill.  Further recommendations pending these results.  She plans to follow-up with pulmonary in the near future as well.  Other details as noted below.     The above portion of this note was dictated by me using voice recognition software. I personally performed the services described in the documentation. The scribe entering the documentation below was in my presence. I affirm that the information is both accurate and complete.      Objective:     Vitals:    05/24/24 1403   BP: 122/72   Pulse: 64       Wt Readings from Last 4 Encounters:   05/24/24 97.6 kg (215 lb 3.2 oz)   04/25/24 101 kg (223 lb)   03/06/24 100 kg (221 lb)   12/06/23 100 kg (221 lb)       Allergies:     Allergies   Allergen Reactions    Erythromycin Itching and Rash    Hydroxychloroquine Itching and Rash    Penicillins Itching and Rash    Adhesive Tape-Silicones Other     Skin red,raw with prolonged use    Amlodipine Swelling     Lower legs and feet    Atorvastatin Swelling     Lower legs and feet        Medications:     Current Outpatient Medications   Medication Instructions    albuterol 90 mcg/actuation inhaler INHALE 1 TO 2 PUFFS BY MOUTH EVERY 4 TO 6 HOURS AS NEEDED    alcohol swabs (Alcohol Pads) pads, medicated 1 Swab, topical (top), Daily    allopurinol (ZYLOPRIM) 100 mg, oral, Daily    Blood glucose monitoring meter kit (Blood Glucose Monitoring) kit 1 each, miscellaneous, Daily, Use to test blood sugar once daily and as needed    blood sugar diagnostic (Blood Glucose Test) strip 1 strip, miscellaneous, Daily    calcium carbonate-vitamin D3 (Calcium 600 + D,3,) 600 mg-10 mcg (400 unit) tablet 1 tablet, oral, Daily    clopidogrel (PLAVIX) 75 mg, oral, Daily    clotrimazole-betamethasone (Lotrisone) cream Twice daily to area of irritation x 2 weeks, then 2-3 x per week    cyanocobalamin (Vitamin B-12) 1,000 mcg tablet 1 tablet, oral, Daily, TAKE PER DIRECTED     dapagliflozin propanediol (FARXIGA) 10 mg, oral, Daily before breakfast    dicyclomine (Bentyl) 20 mg tablet 1 tablet, oral, 3 times daily, TAKE PER DIRECTED    ergocalciferol (VITAMIN D-2) 50,000 Units, oral, Every 14 days    estradiol (Estrace) 0.01 % (0.1 mg/gram) vaginal cream Apply nightly 1 gram for 2 weeks, then 3 times per week.    fexofenadine (ALLEGRA) 180 mg, oral, Daily    fluticasone (Flonase) 50 mcg/actuation nasal spray 2 sprays, Each Nostril, Daily    furosemide (LASIX) 20 mg, oral, 2 times daily    gabapentin (NEURONTIN) 600 mg, oral, 3 times daily    ipratropium-albuteroL (Duo-Neb) 0.5-2.5 mg/3 mL nebulizer solution inhalation, TAKE PER DIRECTED    Januvia 50 mg, oral, TAKE PER DIRECTED    lancets misc Use to test blood sugar once daily    lisinopril 20 mg, oral, 2 times daily    loperamide (Imodium A-D) 2 mg capsule 1 capsule, oral, Daily PRN, TAKE PER DIRECTED    meclizine (ANTIVERT) 25 mg, oral, 3 times daily PRN    metoprolol succinate XL (Toprol-XL) 50 mg 24 hr tablet TAKE 1 TABLET IN IN THE MORNING AND 1/2 TABLET IN IN THE EVENING FOR TOTAL OF 75MG DAILY    nitroglycerin (NITROSTAT) 0.4 mg, sublingual, Every 5 min PRN    oxybutynin XL (DITROPAN XL) 5 mg, oral, Daily, Do not crush, chew, or split.    pantoprazole (ProtoNix) 40 mg EC tablet 1 tablet, oral, 2 times daily    potassium chloride CR 10 mEq ER tablet 10 mEq, oral, Daily    propranolol LA (Inderal LA) 60 mg 24 hr capsule 1 capsule, oral, Daily    sertraline (Zoloft) 100 mg tablet TAKE 1 & 1/2 TABLETS BY MOUTH EVERY DAY    Sharps Container 1 each, miscellaneous, Daily    simvastatin (ZOCOR) 20 mg, oral, Daily    Spiriva Respimat 1.25 mcg/actuation inhaler 2 puffs, inhalation, Daily    Wixela Inhub 250-50 mcg/dose diskus inhaler 1 puff, inhalation, 2 times daily       Physical Examination:   GENERAL:  Well developed, well nourished, in no acute distress.  CHEST:  Symmetric and nontender.  NEURO/PSYCH:  Alert and oriented times three  with approppriate behavior and responses.  NECK:  Supple, no JVD, no bruit.  LUNGS:  Clear to auscultation bilaterally, normal respiratory effort.  HEART:  Rate and rhythm regular with no evident murmur, no gallop appreciated.        There are no rubs, clicks or heaves.  EXTREMITIES:  Warm with good color, no clubbing or cyanosis.  There is no edema noted.  PERIPHERAL VASCULAR:  Pulses present and equally palpable; 2+ throughout.      Lab:     CBC:   Lab Results   Component Value Date    WBC 8.4 04/05/2024    RBC 4.06 04/05/2024    HGB 10.9 (L) 04/05/2024    HCT 34.2 (L) 04/05/2024     04/05/2024        CMP:    Lab Results   Component Value Date     (L) 04/05/2024    K 4.7 04/05/2024    CL 94 (L) 04/05/2024    CO2 28 04/05/2024    BUN 42 (H) 04/05/2024    CREATININE 2.01 (H) 04/05/2024    GLUCOSE 426 (H) 04/05/2024    CALCIUM 10.0 04/05/2024       Magnesium:    Lab Results   Component Value Date    MG 1.90 06/13/2021       Lipid Profile:    Lab Results   Component Value Date    TRIG 666 (H) 04/05/2024    HDL 31.2 04/05/2024    LDLCALC  04/05/2024      Comment:      The calculation of LDL and VLDL are inaccurate when the Triglycerides are greater than 400 mg/dL or when the patient is non-fasting. If LDL measurement is necessary contact the testing laboratory for an alternative LDL assay.                                  Near   Borderline      AGE      Desirable  Optimal    High     High     Very High     0-19 Y     0 - 109     ---    110-129   >/= 130     ----    20-24 Y     0 - 119     ---    120-159   >/= 160     ----      >24 Y     0 -  99   100-129  130-159   160-189     >/=190         TSH:    Lab Results   Component Value Date    TSH 3.22 04/20/2021       BNP:   Lab Results   Component Value Date     (H) 06/08/2021        HgBA1c:    Lab Results   Component Value Date    HGBA1C 14.0 (H) 04/05/2024       BMP:  Lab Results   Component Value Date     (L) 04/05/2024     05/31/2022     " 06/13/2021     (L) 06/12/2021    K 4.7 04/05/2024    K 4.3 05/31/2022    K 3.5 06/13/2021    K 3.5 06/12/2021    CL 94 (L) 04/05/2024     05/31/2022     06/13/2021     06/12/2021    CO2 28 04/05/2024    CO2 27 05/31/2022    CO2 23 06/13/2021    CO2 21 06/12/2021    BUN 42 (H) 04/05/2024    BUN 32 (H) 05/31/2022    BUN 24 (H) 06/13/2021    BUN 28 (H) 06/12/2021    CREATININE 2.01 (H) 04/05/2024    CREATININE 1.88 (H) 05/31/2022    CREATININE 1.07 (H) 06/13/2021    CREATININE 1.22 (H) 06/12/2021       CBC:  Lab Results   Component Value Date    WBC 8.4 04/05/2024    WBC 9.4 05/31/2022    WBC 10.0 06/13/2021    WBC 11.9 (H) 06/12/2021    RBC 4.06 04/05/2024    RBC 3.20 (L) 05/31/2022    RBC 3.18 (L) 06/13/2021    RBC 3.12 (L) 06/12/2021    HGB 10.9 (L) 04/05/2024    HGB 10.7 (L) 05/31/2022    HGB 8.7 (L) 06/13/2021    HGB 8.5 (L) 06/12/2021    HCT 34.2 (L) 04/05/2024    HCT 34.4 (L) 05/31/2022    HCT 28.8 (L) 06/13/2021    HCT 27.8 (L) 06/12/2021    MCV 84 04/05/2024     (H) 05/31/2022    MCV 91 06/13/2021    MCV 89 06/12/2021    MCH 26.8 04/05/2024    MCHC 31.9 (L) 04/05/2024    MCHC 31.1 (L) 05/31/2022    MCHC 30.2 (L) 06/13/2021    MCHC 30.6 (L) 06/12/2021    RDW 16.6 (H) 04/05/2024    RDW 14.0 05/31/2022    RDW 15.5 (H) 06/13/2021    RDW 15.4 (H) 06/12/2021     04/05/2024     05/31/2022     06/13/2021     06/12/2021       Cardiac Enzymes:    No results found for: \"TROPHS\"    Hepatic Function Panel:    Lab Results   Component Value Date    ALKPHOS 91 04/05/2024    ALT 15 04/05/2024    AST 15 04/05/2024    PROT 6.7 04/05/2024    BILITOT 0.4 04/05/2024       Diagnostic Studies:     CT chest wo IV contrast  Result Date: 5/21/2024    Scattered pulmonary opacities in the setting of cystic/emphysematous changes, as detailed above, similar to 12/22/2022. No new suspicious pulmonary opacity identified.   1.4 cm hypodense nodule in the right lobe of the " thyroid in retrospect similar to the previous exam. Further evaluation with ultrasound may be helpful.   Cholelithiasis again incidentally noted.   MACRO: None.   Signed by: Alexandrea Cassidy 5/21/2024 4:28 PM Dictation workstation:   [id number]      Problem List:     Patient Active Problem List   Diagnosis    Acute sinusitis    Allergic rhinitis    [place]    Anxiety    BPPV (benign paroxysmal positional vertigo)    CAD S/P percutaneous coronary angioplasty    Chronic back pain    Chronic diastolic heart failure, NYHA class 1 (Multi)    Chronic obstructive pulmonary disease (Multi)    Chronic venous insufficiency    CKD (chronic kidney disease), stage III (Multi)    Depression    Diabetic peripheral neuropathy (Multi)    DM type 2 with diabetic mixed hyperlipidemia (Multi)    Essential hypertension    GERD (gastroesophageal reflux disease)    Hyperuricemia    Hypotension    IBS (irritable bowel syndrome)    Mixed hyperlipidemia    Neck pain    Nephrolithiasis    Peripheral polyneuropathy    Pleural effusion    Renal insufficiency    Sensory disturbance    Thyroid nodule    TIA (transient ischemic attack)    Vertebral artery narrowing    Vertebrobasilar insufficiency    Vitamin D deficiency    Otitis externa of left ear       Asessment:     Problem List Items Addressed This Visit             ICD-10-CM    CAD S/P percutaneous coronary angioplasty I25.10, Z98.61    Relevant Orders    CBC    Comprehensive metabolic panel    Lipid panel    Follow Up In Cardiology    Chronic diastolic heart failure, NYHA class 1 (Multi) I50.32    Relevant Orders    CBC    Comprehensive metabolic panel    Lipid panel    Follow Up In Cardiology    Chronic venous insufficiency I87.2    Relevant Orders    CBC    Comprehensive metabolic panel    Lipid panel    Follow Up In Cardiology    DM type 2 with diabetic mixed hyperlipidemia (Multi) E11.69, E78.2    Relevant Orders    CBC    Comprehensive metabolic panel    Lipid panel    Follow Up In  Cardiology    Essential hypertension I10    Relevant Orders    CBC    Comprehensive metabolic panel    Lipid panel    Follow Up In Cardiology    Mixed hyperlipidemia E78.2    Relevant Medications    icosapent ethyL (Vascepa) 1 gram capsule    Other Relevant Orders    CBC    Comprehensive metabolic panel    Lipid panel    Follow Up In Cardiology    Shortness of breath R06.02    Relevant Orders    Nuclear Stress Test    CBC    Comprehensive metabolic panel    Lipid panel    Follow Up In Cardiology    BMI 35.0-35.9,adult Z68.35    Relevant Orders    CBC    Comprehensive metabolic panel    Lipid panel    Follow Up In Cardiology    Atherosclerosis of native coronary artery of native heart with angina pectoris (CMS-HCC) - Primary I25.119    History of PTCA Z98.61

## 2024-05-29 DIAGNOSIS — I10 ESSENTIAL (PRIMARY) HYPERTENSION: ICD-10-CM

## 2024-05-30 ENCOUNTER — OFFICE VISIT (OUTPATIENT)
Dept: PAIN MANAGEMENT | Age: 69
End: 2024-05-30
Payer: MEDICARE

## 2024-05-30 VITALS — TEMPERATURE: 97.3 F | HEIGHT: 65 IN | WEIGHT: 215 LBS | BODY MASS INDEX: 35.82 KG/M2

## 2024-05-30 DIAGNOSIS — M47.812 CERVICAL SPONDYLOSIS WITHOUT MYELOPATHY: ICD-10-CM

## 2024-05-30 DIAGNOSIS — G89.4 CHRONIC PAIN SYNDROME: ICD-10-CM

## 2024-05-30 DIAGNOSIS — M47.817 LUMBOSACRAL SPONDYLOSIS WITHOUT MYELOPATHY: Primary | ICD-10-CM

## 2024-05-30 PROCEDURE — 99214 OFFICE O/P EST MOD 30 MIN: CPT | Performed by: PAIN MEDICINE

## 2024-05-30 PROCEDURE — 1123F ACP DISCUSS/DSCN MKR DOCD: CPT | Performed by: PAIN MEDICINE

## 2024-05-30 RX ORDER — TRAMADOL HYDROCHLORIDE 50 MG/1
50 TABLET ORAL DAILY PRN
Qty: 30 TABLET | Refills: 0 | Status: SHIPPED | OUTPATIENT
Start: 2024-05-30 | End: 2024-06-29

## 2024-05-30 NOTE — PROGRESS NOTES
[facility]  [address], [address]  [place], [place]  P: [phone]  F: [phone]        Graciela Reddy  (1955)    5/30/2024    Subjective:     Graciela Reddy is 68 y.o. female who complains today of:     Chief Complaint   Patient presents with    Back Pain     Right side      Patient here today for follow-up.  Cervical RF ablation helped over 50% done about 6 months ago.  That area is still not too bothersome.  She takes Ultram sparingly has not had it for quite some time helped her significantly specially at night.  She has multiple medical problems.  She has a lot of low back pain on the right side worse with bending twisting activity.  Better with rest.  The pain can be achy sharp and varies with spasms.  No new weakness.    Plan: Discussed options in detail.  [place] is reviewed narcotic drug policy..  Continue Ultram once a day as needed #30 for her axial back pain lumbar flexion-extension views to eval for instability.  I like to authorize right L3-4-5 medial branch block she may be a candidate for radiofrequency ablation.  She has failed conservative.  Continue home excise program.  Questions answered chart was reviewed.  She is in agreement with plan.  We can safely do the procedure with her blood thinners she is diabetic so she has to watch her sugars.  Risk benefits discussed at length.    Allergies:  Atorvastatin, Erythromycin, Hydroxychloroquine, Penicillins, Amlodipine besylate, and Glimepiride    No past medical history on file.  No past surgical history on file.  No family history on file.  Social History     Socioeconomic History    Marital status:      Spouse name: Not on file    Number of children: Not on file    Years of education: Not on file    Highest education level: Not on file   Occupational History    Not on file   Tobacco Use    Smoking status: Never    Smokeless tobacco: Never

## 2024-05-31 RX ORDER — ALLOPURINOL 100 MG/1
100 TABLET ORAL DAILY
Qty: 90 TABLET | Refills: 1 | Status: SHIPPED | OUTPATIENT
Start: 2024-05-31

## 2024-06-03 ENCOUNTER — PATIENT OUTREACH (OUTPATIENT)
Dept: CARE COORDINATION | Facility: CLINIC | Age: 69
End: 2024-06-03
Payer: MEDICARE

## 2024-06-03 DIAGNOSIS — M54.9 DORSALGIA, UNSPECIFIED: ICD-10-CM

## 2024-06-03 DIAGNOSIS — G89.29 OTHER CHRONIC PAIN: ICD-10-CM

## 2024-06-03 NOTE — PROGRESS NOTES
Date: 06/03/24    Dear Catherine Patel    Our records indicate that you are due for the following appointment or test: Annual Wellness Visit    Please call our office at your earliest convenience. We can assist you with scheduling an appointment or test.  If you have already scheduled an appointment or have completed testing, please disregard this letter.    Sincerely,    Elisa Dunn    [address]  [address]  [place] [address]    Office Phone: [phone]  Patient Navigator: [phone]

## 2024-06-05 ENCOUNTER — TELEPHONE (OUTPATIENT)
Dept: PAIN MANAGEMENT | Age: 69
End: 2024-06-05

## 2024-06-05 RX ORDER — GABAPENTIN 300 MG/1
600 CAPSULE ORAL 3 TIMES DAILY
Qty: 360 CAPSULE | Refills: 0 | Status: SHIPPED | OUTPATIENT
Start: 2024-06-05

## 2024-06-05 NOTE — TELEPHONE ENCOUNTER
PLEASE CONTACT PATIENT AND SEE IF PATIENT HAS COMPLETED LUMBAR XR THAT WAS ORDERED ON 5/30/24    THIS IS NEEDED IN ORDER TO SUBMIT AUTH FOR THE RIGHT L345 MBB THAT WAS ORDERED.

## 2024-06-08 DIAGNOSIS — J30.9 ALLERGIC RHINITIS, UNSPECIFIED SEASONALITY, UNSPECIFIED TRIGGER: ICD-10-CM

## 2024-06-10 RX ORDER — FLUTICASONE PROPIONATE 50 MCG
2 SPRAY, SUSPENSION (ML) NASAL DAILY
Qty: 48 G | Refills: 1 | Status: SHIPPED | OUTPATIENT
Start: 2024-06-10

## 2024-06-10 NOTE — TELEPHONE ENCOUNTER
Patient states she not sure if she got it done but she will go up to a [facility] facility tomorrow to get the Xray done.

## 2024-06-12 ENCOUNTER — HOSPITAL ENCOUNTER (OUTPATIENT)
Dept: GENERAL RADIOLOGY | Age: 69
Discharge: HOME | End: 2024-06-14
Attending: PAIN MEDICINE
Payer: MEDICARE

## 2024-06-12 DIAGNOSIS — M47.817 LUMBOSACRAL SPONDYLOSIS WITHOUT MYELOPATHY: ICD-10-CM

## 2024-06-12 PROCEDURE — 72110 X-RAY EXAM L-2 SPINE 4/>VWS: CPT

## 2024-06-17 ENCOUNTER — OFFICE VISIT (OUTPATIENT)
Dept: ENDOCRINOLOGY | Age: 69
End: 2024-06-17
Payer: MEDICARE

## 2024-06-17 VITALS
HEIGHT: 65 IN | SYSTOLIC BLOOD PRESSURE: 112 MMHG | WEIGHT: 213 LBS | HEART RATE: 69 BPM | BODY MASS INDEX: 35.49 KG/M2 | OXYGEN SATURATION: 94 % | DIASTOLIC BLOOD PRESSURE: 61 MMHG

## 2024-06-17 DIAGNOSIS — Z79.4 TYPE 2 DIABETES MELLITUS WITH HYPERGLYCEMIA, WITH LONG-TERM CURRENT USE OF INSULIN (HCC): ICD-10-CM

## 2024-06-17 DIAGNOSIS — E11.65 TYPE 2 DIABETES MELLITUS WITH HYPERGLYCEMIA, WITH LONG-TERM CURRENT USE OF INSULIN (HCC): ICD-10-CM

## 2024-06-17 DIAGNOSIS — R73.9 HYPERGLYCEMIA: Primary | ICD-10-CM

## 2024-06-17 LAB
CHP ED QC CHECK: NORMAL
GLUCOSE BLD-MCNC: 500 MG/DL

## 2024-06-17 PROCEDURE — 1123F ACP DISCUSS/DSCN MKR DOCD: CPT | Performed by: PHYSICIAN ASSISTANT

## 2024-06-17 PROCEDURE — 99204 OFFICE O/P NEW MOD 45 MIN: CPT | Performed by: PHYSICIAN ASSISTANT

## 2024-06-17 PROCEDURE — 82962 GLUCOSE BLOOD TEST: CPT | Performed by: PHYSICIAN ASSISTANT

## 2024-06-17 RX ORDER — INSULIN LISPRO 100 [IU]/ML
15 INJECTION, SOLUTION INTRAVENOUS; SUBCUTANEOUS
Qty: 5 ADJUSTABLE DOSE PRE-FILLED PEN SYRINGE | Refills: 5 | Status: SHIPPED | OUTPATIENT
Start: 2024-06-17

## 2024-06-17 RX ORDER — ACYCLOVIR 400 MG/1
1 TABLET ORAL DAILY
Qty: 1 EACH | Refills: 0 | Status: SHIPPED | OUTPATIENT
Start: 2024-06-17

## 2024-06-17 RX ORDER — DAPAGLIFLOZIN 10 MG/1
10 TABLET, FILM COATED ORAL EVERY MORNING
Qty: 30 TABLET | Refills: 5 | Status: SHIPPED | OUTPATIENT
Start: 2024-06-17

## 2024-06-17 RX ORDER — INSULIN GLARGINE 100 [IU]/ML
25 INJECTION, SOLUTION SUBCUTANEOUS NIGHTLY
Qty: 5 ADJUSTABLE DOSE PRE-FILLED PEN SYRINGE | Refills: 3 | Status: SHIPPED | OUTPATIENT
Start: 2024-06-17

## 2024-06-17 RX ORDER — ACYCLOVIR 400 MG/1
1 TABLET ORAL
Qty: 12 EACH | Refills: 10 | Status: SHIPPED | OUTPATIENT
Start: 2024-06-17

## 2024-06-17 ASSESSMENT — ENCOUNTER SYMPTOMS
SHORTNESS OF BREATH: 0
COUGH: 0
BLURRED VISION: 1
DIARRHEA: 0
RHINORRHEA: 0
VOMITING: 0
ABDOMINAL PAIN: 0
WHEEZING: 0
SINUS PRESSURE: 0
NAUSEA: 0
SORE THROAT: 0

## 2024-06-17 NOTE — PROGRESS NOTES
for chest pain, palpitations and leg swelling.   Gastrointestinal:  Negative for abdominal pain, diarrhea, nausea and vomiting.   Endocrine: Positive for polydipsia and polyuria. Negative for cold intolerance and heat intolerance.   Genitourinary:  Negative for difficulty urinating.   Musculoskeletal:  Negative for arthralgias.   Skin:  Negative for rash.   Allergic/Immunologic: Negative for environmental allergies.   Neurological:  Negative for dizziness and headaches.   Hematological:  Does not bruise/bleed easily.   Psychiatric/Behavioral:  Negative for dysphoric mood.        Objective:   Physical Exam  Vitals reviewed.   Constitutional:       Appearance: Normal appearance. She is well-developed. She is not ill-appearing.   HENT:      Head: Normocephalic and atraumatic.      Nose: No congestion.   Eyes:      Conjunctiva/sclera: Conjunctivae normal.   Neck:      Comments: No thyromegaly or palpable nodules  Cardiovascular:      Rate and Rhythm: Normal rate and regular rhythm.      Heart sounds: Normal heart sounds.   Pulmonary:      Effort: Pulmonary effort is normal.      Breath sounds: Normal breath sounds.   Abdominal:      General: Bowel sounds are normal.      Palpations: Abdomen is soft.   Musculoskeletal:         General: Normal range of motion.      Cervical back: Normal range of motion and neck supple.   Skin:     General: Skin is warm and dry.   Neurological:      General: No focal deficit present.      Mental Status: She is alert and oriented to person, place, and time.   Psychiatric:         Mood and Affect: Mood normal.           Reviewed with the patient: current clinical status, medications, activities and diet.      Side effects, adverse effects of the medication prescribed today, as well as treatment plan/ rationale and result expectations have been discussed with the patient who expresses understanding and desires to proceed.     Close follow up to evaluate treatment results and for coordination

## 2024-06-17 NOTE — PATIENT INSTRUCTIONS
Endocrinology-    Check your blood sugars 4 times a day, before meals and at night  Document these numbers in a blood glucose log and bring them with you to your follow-up appointment.  If you are prescribed insulin, Do not take your mealtime insulin if your blood sugars less than 120   Call our office if you have blood sugars less than 80 or greater then 300 on two or more occasions  Call our office if you have any questions regarding your blood sugars or insulin dosing regiment  Signs of low blood sugar may include tremors, feeling shaky, sweating, dizziness, confusion and weakness. Check your blood sugar immediatly if you have any of these symptoms.     The plan as discussed at your appointment-

## 2024-06-18 DIAGNOSIS — Z79.4 TYPE 2 DIABETES MELLITUS WITH HYPERGLYCEMIA, WITH LONG-TERM CURRENT USE OF INSULIN (HCC): Primary | ICD-10-CM

## 2024-06-18 DIAGNOSIS — E11.65 TYPE 2 DIABETES MELLITUS WITH HYPERGLYCEMIA, WITH LONG-TERM CURRENT USE OF INSULIN (HCC): Primary | ICD-10-CM

## 2024-06-18 NOTE — TELEPHONE ENCOUNTER
Patient needs a prescription for the test strips-enough to test at least 3X per day-please send to [facility] (Doug Martinez.)

## 2024-06-19 RX ORDER — CALCIUM CITRATE/VITAMIN D3 200MG-6.25
1 TABLET ORAL DAILY
Qty: 200 EACH | Refills: 3 | Status: SHIPPED | OUTPATIENT
Start: 2024-06-19

## 2024-06-20 ENCOUNTER — HOSPITAL ENCOUNTER (OUTPATIENT)
Dept: RADIOLOGY | Facility: CLINIC | Age: 69
Discharge: HOME | End: 2024-06-20
Payer: MEDICARE

## 2024-06-20 ENCOUNTER — HOSPITAL ENCOUNTER (OUTPATIENT)
Dept: CARDIOLOGY | Facility: CLINIC | Age: 69
Discharge: HOME | End: 2024-06-20
Payer: MEDICARE

## 2024-06-20 DIAGNOSIS — R06.02 SHORTNESS OF BREATH: ICD-10-CM

## 2024-06-20 DIAGNOSIS — R06.02 SHORTNESS OF BREATH: Primary | ICD-10-CM

## 2024-06-20 PROCEDURE — 93017 CV STRESS TEST TRACING ONLY: CPT

## 2024-06-20 PROCEDURE — 78452 HT MUSCLE IMAGE SPECT MULT: CPT

## 2024-06-20 PROCEDURE — 3430000001 HC RX 343 DIAGNOSTIC RADIOPHARMACEUTICALS: Performed by: INTERNAL MEDICINE

## 2024-06-20 PROCEDURE — 2500000004 HC RX 250 GENERAL PHARMACY W/ HCPCS (ALT 636 FOR OP/ED): Performed by: INTERNAL MEDICINE

## 2024-06-20 PROCEDURE — A9502 TC99M TETROFOSMIN: HCPCS | Performed by: INTERNAL MEDICINE

## 2024-06-20 RX ORDER — REGADENOSON 0.08 MG/ML
0.4 INJECTION, SOLUTION INTRAVENOUS ONCE
Status: COMPLETED | OUTPATIENT
Start: 2024-06-20 | End: 2024-06-20

## 2024-06-21 ENCOUNTER — HOSPITAL ENCOUNTER (OUTPATIENT)
Dept: RADIOLOGY | Facility: CLINIC | Age: 69
Discharge: HOME | End: 2024-06-21
Payer: MEDICARE

## 2024-06-21 PROCEDURE — A9502 TC99M TETROFOSMIN: HCPCS | Performed by: INTERNAL MEDICINE

## 2024-06-21 PROCEDURE — 3430000001 HC RX 343 DIAGNOSTIC RADIOPHARMACEUTICALS: Performed by: INTERNAL MEDICINE

## 2024-06-24 ENCOUNTER — HOSPITAL ENCOUNTER (OUTPATIENT)
Dept: DIABETES SERVICES | Age: 69
Setting detail: THERAPIES SERIES
Discharge: HOME | End: 2024-06-24
Payer: MEDICARE

## 2024-06-24 PROCEDURE — G0108 DIAB MANAGE TRN  PER INDIV: HCPCS

## 2024-06-24 RX ORDER — INSULIN ASPART 100 [IU]/ML
INJECTION, SOLUTION INTRAVENOUS; SUBCUTANEOUS
Qty: 30 ADJUSTABLE DOSE PRE-FILLED PEN SYRINGE | Refills: 3 | Status: SHIPPED | OUTPATIENT
Start: 2024-06-24

## 2024-06-24 SDOH — ECONOMIC STABILITY: FOOD INSECURITY: ADDITIONAL INFORMATION: NO

## 2024-06-24 ASSESSMENT — PROBLEM AREAS IN DIABETES QUESTIONNAIRE (PAID)
WORRYING ABOUT THE FUTURE AND THE POSSIBILITY OF SERIOUS COMPLICATIONS: SERIOUS PROBLEM
FEELING THAT DIABETES IS TAKING UP TOO MUCH OF YOUR MENTAL AND PHYSICAL ENERGY EVERY DAY: NOT A PROBLEM
PAID-5 TOTAL SCORE: 6
FEELING SCARED WHEN YOU THINK ABOUT LIVING WITH DIABETES: NOT A PROBLEM
COPING WITH COMPLICATIONS OF DIABETES: MINOR PROBLEM
FEELING DEPRESSED WHEN YOU THINK ABOUT LIVING WITH DIABETES: MINOR PROBLEM

## 2024-06-24 ASSESSMENT — SLEEP AND FATIGUE QUESTIONNAIRES
HOW MANY HOURS OF SLEEP ARE YOU GETTING, ON AVERAGE: LESS THAN 7
HAVE YOU EVER BEEN TESTED FOR SLEEP APNEA: YES
HOW DO YOU RATE THE QUALITY OF YOUR SLEEP: FAIR

## 2024-06-24 NOTE — PROGRESS NOTES
diabetes” - Packet of Handouts from Diabetes Department    [] Self-Management  Class 2 - Meal Plan,  “Choose your Foods - Food Lists for Diabetes” and Nutrition in the Fast [employer] - fast facts about fast food    [] Self-Management  Class 3 -  Diabetes ID card,  foot care tips sheet,  “Continuing Your Journey with Diabetes”, Individualized Diabetes report card, “Sick Day Rules”, Diabetes Cookbooks, Magazines and Pedometers when available    [] Glucose Meter     [] BD Getting Started Insulin Kit     [] Other      Encounter Type Date Start Time End Time Comments No Show Dates   Assessment 06/24/24  Carolin Michaud RN 1pm 230pm 06/24/24    [x] Patient has no barriers to learning and recommend attending classes.  Classes scheduled for:  did not discuss today    [] Patient has the following barriers to learning and would benefit from additional education in an individual setting. Barriers:      [] Will follow up:    [] Patient declines classes at this time.       [] Will follow up:    Carolin Michaud RN    Class 1 - Understanding diabetes      [] First class completed today.     Class 2- Nutrition and diabetes        [] Second class completed today    Class 3 - Preventing Complications     [] Third class completed today.     [] Patient has completed all 3 classes today.  Goal sheets and DSMS Support Plan completed.  Will follow up in 3-4 months via telephone.  Patient advised to contact Diabetes Education office if any questions. Number provided.      [] Patient needs to attend Class #    in order to complete classes. Scheduled for:       Individual MNT         3 Month Follow-up      []In Person  []Telephone    Meter Instrx        Insulin Instrx      []Pen  []Vial & Syringe      DSMS Support Plan:  Follow-up plan:    [] Healthy Coping  [] Emotional Support  [] National [place] on Mental Illness (JENNIFER) - (Depression, bipolar and other support) [phone]; www.jennifer.org    [] Depression & Bipolar Support [place]  [phone];

## 2024-06-26 ENCOUNTER — APPOINTMENT (OUTPATIENT)
Dept: PRIMARY CARE | Facility: CLINIC | Age: 69
End: 2024-06-26
Payer: MEDICARE

## 2024-06-26 ENCOUNTER — TELEPHONE (OUTPATIENT)
Dept: PAIN MANAGEMENT | Age: 69
End: 2024-06-26

## 2024-06-26 VITALS
WEIGHT: 209 LBS | HEIGHT: 65 IN | DIASTOLIC BLOOD PRESSURE: 60 MMHG | TEMPERATURE: 97.3 F | SYSTOLIC BLOOD PRESSURE: 85 MMHG | HEART RATE: 84 BPM | BODY MASS INDEX: 34.82 KG/M2

## 2024-06-26 DIAGNOSIS — E66.9 CLASS 1 OBESITY WITH BODY MASS INDEX (BMI) OF 34.0 TO 34.9 IN ADULT, UNSPECIFIED OBESITY TYPE, UNSPECIFIED WHETHER SERIOUS COMORBIDITY PRESENT: ICD-10-CM

## 2024-06-26 DIAGNOSIS — R53.1 GENERALIZED WEAKNESS: ICD-10-CM

## 2024-06-26 DIAGNOSIS — E78.2 DM TYPE 2 WITH DIABETIC MIXED HYPERLIPIDEMIA (MULTI): ICD-10-CM

## 2024-06-26 DIAGNOSIS — E04.1 THYROID NODULE: ICD-10-CM

## 2024-06-26 DIAGNOSIS — E11.69 DM TYPE 2 WITH DIABETIC MIXED HYPERLIPIDEMIA (MULTI): ICD-10-CM

## 2024-06-26 DIAGNOSIS — Z12.31 ENCOUNTER FOR SCREENING MAMMOGRAM FOR BREAST CANCER: ICD-10-CM

## 2024-06-26 DIAGNOSIS — I10 ESSENTIAL HYPERTENSION: ICD-10-CM

## 2024-06-26 DIAGNOSIS — Z87.891 FORMER SMOKER: ICD-10-CM

## 2024-06-26 PROBLEM — E66.811 CLASS 1 OBESITY WITH BODY MASS INDEX (BMI) OF 34.0 TO 34.9 IN ADULT: Status: ACTIVE | Noted: 2024-06-26

## 2024-06-26 RX ORDER — INSULIN ASPART 100 [IU]/ML
15 INJECTION, SOLUTION INTRAVENOUS; SUBCUTANEOUS
COMMUNITY

## 2024-06-26 RX ORDER — INSULIN GLARGINE 100 [IU]/ML
25 INJECTION, SOLUTION SUBCUTANEOUS NIGHTLY
COMMUNITY

## 2024-06-26 ASSESSMENT — PATIENT HEALTH QUESTIONNAIRE - PHQ9
2. FEELING DOWN, DEPRESSED OR HOPELESS: NOT AT ALL
2. FEELING DOWN, DEPRESSED OR HOPELESS: NOT AT ALL
SUM OF ALL RESPONSES TO PHQ9 QUESTIONS 1 AND 2: 0
1. LITTLE INTEREST OR PLEASURE IN DOING THINGS: NOT AT ALL
1. LITTLE INTEREST OR PLEASURE IN DOING THINGS: NOT AT ALL
SUM OF ALL RESPONSES TO PHQ9 QUESTIONS 1 AND 2: 0

## 2024-06-26 ASSESSMENT — ACTIVITIES OF DAILY LIVING (ADL)
DOING_HOUSEWORK: INDEPENDENT
TAKING_MEDICATION: INDEPENDENT
DRESSING: INDEPENDENT
GROCERY_SHOPPING: INDEPENDENT
BATHING: INDEPENDENT
MANAGING_FINANCES: INDEPENDENT

## 2024-06-26 NOTE — TELEPHONE ENCOUNTER
REFERRAL # [id number]    RIGHT L345 MBB     AUTH FROM 6/26/24-12/23/24    OK to schedule procedure approved as above.   Please note sides/levels approved and date range.   (If applicable, sides/levels approved may differ from those ordered)    TO BE SCHEDULED WITH DR NJ

## 2024-06-26 NOTE — PROGRESS NOTES
Maciej Alexander is here for her annual wellness visit.  She states that overall she has been feeling well.  She has no new complaints at the present time.  She continues on her medications as noted.  She is seeing an endocrinologist at [facility] for diabetic consultation.  Her medications have been changed.  She also was instructed on a diet as well.  She recently saw Dr. Cox for cardiology care and Dr. Thompson for pulmonary care.  Eye examinations are up-to-date.    Patient ID: Catherine Patel is a 68 y.o. female who presents for Follow-up and Medicare Annual Wellness Visit Subsequent:    Problem List Items Addressed This Visit    None     Past Medical History:   Diagnosis Date    Body mass index (BMI)40.0-44.9, adult 07/29/2022    BMI 40.0-44.9, adult    Carpal tunnel syndrome, left upper limb 09/16/2021    Carpal tunnel syndrome of left wrist    Dizziness and giddiness 09/16/2021    Postural dizziness    History of falling 09/08/2022    Status post fall    Localized edema 07/29/2022    Edema leg    Morbid (severe) obesity due to excess calories (Multi) 09/29/2022    Morbid obesity with BMI of 40.0-44.9, adult    Pain in right hip 01/20/2022    Right hip pain    Personal history of other endocrine, nutritional and metabolic disease 01/20/2022    History of morbid obesity    Personal history of other specified conditions 10/23/2021    History of vertigo    Personal history of other specified conditions 07/13/2022    History of urinary frequency    Personal history of other specified conditions 08/30/2022    History of edema    Personal history of other specified conditions 08/30/2022    History of shortness of breath    Pneumonia, unspecified organism     Pneumonia of right lung due to infectious organism, unspecified part of lung    Shortness of breath 07/29/2022    Shortness of breath on exertion    Syncope and collapse 09/16/2021    Near syncope      Past Surgical History:   Procedure Laterality Date     MR HEAD ANGIO WO IV CONTRAST  3/7/2020    MR HEAD ANGIO WO IV CONTRAST 3/7/2020 [facility]    MR NECK ANGIO WO IV CONTRAST  3/7/2020    MR NECK ANGIO WO IV CONTRAST 3/7/2020 [facility]    OTHER SURGICAL HISTORY  2022    Eye surgery    OTHER SURGICAL HISTORY  2022    Appendectomy    OTHER SURGICAL HISTORY  2022    Cyst excision    OTHER SURGICAL HISTORY  2022    Cardiac catheterization with stent placement    OTHER SURGICAL HISTORY  2022    Tonsillectomy with adenoidectomy    OTHER SURGICAL HISTORY  2022    Esophagogastroduodenoscopy    OTHER SURGICAL HISTORY  2022    Ankle surgery    OTHER SURGICAL HISTORY  2022     section    OTHER SURGICAL HISTORY  11/10/2022    Complete colonoscopy      Family History   Problem Relation Name Age of Onset    Arthritis Mother      Hypertension Mother      Lung cancer Mother      COPD Father      Other (CARDIAC DISORDER) Father      Hyperlipidemia Father      Hypertension Father      Diabetes Father      Other (MESONEPHROMA, BENIGN) Father      Colonic polyp Sister      Thyroid cancer Sister      Other (HEART VALVE REPLACED) Father's Sister        Social History     Socioeconomic History    Marital status:      Spouse name: Not on file    Number of children: Not on file    Years of education: Not on file    Highest education level: Not on file   Occupational History    Not on file   Tobacco Use    Smoking status: Former     Types: Cigarettes    Smokeless tobacco: Never   Substance and Sexual Activity    Alcohol use: Not Currently    Drug use: Never    Sexual activity: Not on file   Other Topics Concern    Not on file   Social History Narrative    Not on file     Social Determinants of Health     Financial Resource Strain: Not on file   Food Insecurity: Not on file   Transportation Needs: Not on file   Physical Activity: Not on file   Stress: Not on file   Social Connections: Not on file   Intimate  Partner Violence: Not on file   Housing Stability: Not on file      Erythromycin, Hydroxychloroquine, Penicillins, Adhesive tape-silicones, Amlodipine, and Atorvastatin   Current Outpatient Medications   Medication Sig Dispense Refill    albuterol 90 mcg/actuation inhaler INHALE 1 TO 2 PUFFS BY MOUTH EVERY 4 TO 6 HOURS AS NEEDED (Patient taking differently: Inhale 2 puffs every 4 hours if needed for shortness of breath or wheezing.) 18 g 3    alcohol swabs (Alcohol Pads) pads, medicated Apply 1 Swab topically once daily. 100 each 3    allopurinol (Zyloprim) 100 mg tablet TAKE 1 TABLET BY MOUTH EVERY DAY 90 tablet 1    Blood glucose monitoring meter kit (Blood Glucose Monitoring) kit 1 each once daily. Use to test blood sugar once daily and as needed 1 kit 1    blood sugar diagnostic (Blood Glucose Test) strip 1 strip once daily. 100 strip 3    calcium carbonate-vitamin D3 (Calcium 600 + D,3,) 600 mg-10 mcg (400 unit) tablet Take 1 tablet by mouth once daily.      clopidogrel (Plavix) 75 mg tablet TAKE 1 TABLET DAILY 90 tablet 1    clotrimazole-betamethasone (Lotrisone) cream Twice daily to area of irritation x 2 weeks, then 2-3 x per week (Patient taking differently: Apply 1 Application topically 2 times a day. Twice daily to area of irritation x 2 weeks, then 2-3 x per week) 15 g 0    cyanocobalamin (Vitamin B-12) 1,000 mcg tablet Take 1 tablet (1,000 mcg) by mouth once daily. TAKE PER DIRECTED      dapagliflozin propanediol (Farxiga) 10 mg Take 1 tablet (10 mg) by mouth once daily in the morning. Take before meals.      dicyclomine (Bentyl) 20 mg tablet Take 1 tablet (20 mg) by mouth 3 times a day. TAKE PER DIRECTED      ergocalciferol (Vitamin D-2) 1.25 MG (63673 UT) capsule Take 1 capsule (50,000 Units) by mouth every 14 (fourteen) days. 6 capsule 2    estradiol (Estrace) 0.01 % (0.1 mg/gram) vaginal cream Apply nightly 1 gram for 2 weeks, then 3 times per week. 42.5 g 5    fexofenadine (Allegra) 180 mg tablet  Take 1 tablet (180 mg) by mouth once daily. 90 tablet 1    fluticasone (Flonase) 50 mcg/actuation nasal spray USE 2 SPRAYS INTO EACH NOSTRIL ONCE DAILY 48 g 1    furosemide (Lasix) 20 mg tablet TAKE 1 TABLET BY MOUTH TWICE A DAY (Patient taking differently: Take 1 tablet (20 mg) by mouth once daily.) 180 tablet 3    gabapentin (Neurontin) 300 mg capsule TAKE 2 CAPSULES BY MOUTH 3 TIMES A  capsule 0    icosapent ethyL (Vascepa) 1 gram capsule Take 1 capsule (1 g) by mouth 2 times daily (morning and late afternoon). 180 capsule 3    insulin aspart (NovoLOG Flexpen U-100 Insulin) 100 unit/mL (3 mL) pen Inject 15 Units under the skin 3 times a day before meals. Take as directed per insulin instructions.      insulin glargine (Lantus Solostar U-100 Insulin) 100 unit/mL (3 mL) pen Inject 25 Units under the skin once daily at bedtime. Take as directed per insulin instructions.      ipratropium-albuteroL (Duo-Neb) 0.5-2.5 mg/3 mL nebulizer solution Inhale. TAKE PER DIRECTED      lancets misc Use to test blood sugar once daily 100 each 3    lisinopril 20 mg tablet TAKE 1 TABLET BY MOUTH TWICE A DAY (Patient taking differently: Take 1 tablet (20 mg) by mouth once daily.) 180 tablet 1    loperamide (Imodium A-D) 2 mg capsule Take 1 capsule (2 mg) by mouth once daily as needed. TAKE PER DIRECTED      meclizine (Antivert) 25 mg tablet TAKE 1 TABLET BY MOUTH THREE TIMES A DAY AS NEEDED 270 tablet 1    metoprolol succinate XL (Toprol-XL) 50 mg 24 hr tablet TAKE 1 TABLET IN IN THE MORNING AND 1/2 TABLET IN IN THE EVENING FOR TOTAL OF 75MG DAILY (Patient taking differently: Take 1.5 tablets (75 mg) by mouth once daily. TAKE 1 TABLET IN IN THE MORNING AND 1/2 TABLET IN IN THE EVENING  FOR TOTAL OF 75MG DAILY) 135 tablet 1    nitroglycerin (Nitrostat) 0.4 mg SL tablet Place 1 tablet (0.4 mg) under the tongue every 5 minutes if needed for chest pain.      oxybutynin XL (Ditropan XL) 5 mg 24 hr tablet Take 1 tablet (5 mg) by  mouth once daily. Do not crush, chew, or split. 90 tablet 3    pantoprazole (ProtoNix) 40 mg EC tablet Take 1 tablet (40 mg) by mouth 2 times a day.      potassium chloride CR 10 mEq ER tablet TAKE 1 TABLET DAILY 90 tablet 1    propranolol LA (Inderal LA) 60 mg 24 hr capsule Take 1 capsule (60 mg) by mouth once daily.      sertraline (Zoloft) 100 mg tablet TAKE 1 & 1/2 TABLETS BY MOUTH EVERY DAY (Patient taking differently: Take 1.5 tablets (150 mg) by mouth once daily.) 135 tablet 1    Sharps Container 1 each once daily. 1 each 3    simvastatin (Zocor) 20 mg tablet TAKE 1 TABLET BY MOUTH EVERY DAY 90 tablet 1    Spiriva Respimat 1.25 mcg/actuation inhaler Inhale 2 puffs once daily.      Wixela Inhub 250-50 mcg/dose diskus inhaler TAKE 1 PUFF BY MOUTH TWICE A  each 1    Januvia 50 mg tablet Take 1 tablet (50 mg) by mouth. TAKE PER DIRECTED       No current facility-administered medications for this visit.       Immunization History   Administered Date(s) Administered    Flu vaccine (IIV4), preservative free *Check age/dose* 10/19/2016, 10/03/2017, 10/01/2019    Flu vaccine, quadrivalent, high-dose, preservative free, age 65y+ (FLUZONE) 12/23/2021, 02/02/2023    Flu vaccine, quadrivalent, no egg protein, age 6 month or greater (FLUCELVAX) 12/09/2018    Flu vaccine, quadrivalent, recombinant, preservative free, adult (FLUBLOK) 11/06/2019    Hepatitis B vaccine, adult *Check Product/Dose* 09/13/2010, 10/18/2010, 04/08/2011    Influenza, Unspecified 09/01/2013, 10/13/2014    Influenza, injectable, quadrivalent 10/13/2020    Influenza, seasonal, injectable, preservative free 12/02/2015    PPD Test 05/16/2011    Pfizer Purple Cap SARS-CoV-2 04/17/2021, 05/08/2021, 12/23/2021    Pneumococcal conjugate vaccine, 13-valent (PREVNAR 13) 05/31/2022    Pneumococcal polysaccharide vaccine, 23-valent, age 2 years and older (PNEUMOVAX 23) 03/11/2016    Tdap vaccine, age 7 year and older (BOOSTRIX, ADACEL) 10/17/2017     Zoster vaccine, recombinant, adult (SHINGRIX) 11/06/2019, 10/13/2020, 02/06/2021    Zoster, Unspecified 10/01/2019        Review of Systems   Constitutional: Negative.    HENT: Negative.     Eyes: Negative.    Respiratory: Negative.     Cardiovascular: Negative.    Gastrointestinal: Negative.    Endocrine: Negative.    Genitourinary: Negative.    Musculoskeletal: Negative.    Skin: Negative.    Allergic/Immunologic: Negative.    Neurological: Negative.    Hematological: Negative.    Psychiatric/Behavioral: Negative.     All other systems reviewed and are negative.       Vitals:    06/26/24 1215   BP: 85/60   Pulse: 84   Temp: 36.3 °C (97.3 °F)     Vitals:    06/26/24 1215   Weight: 94.8 kg (209 lb)      Physical Exam  Constitutional:       General: She is not in acute distress.     Appearance: Normal appearance.   Cardiovascular:      Rate and Rhythm: Normal rate and regular rhythm.      Pulses: Normal pulses.      Heart sounds: Normal heart sounds. No murmur heard.     No friction rub. No gallop.   Pulmonary:      Effort: Pulmonary effort is normal. No respiratory distress.      Breath sounds: Normal breath sounds. No wheezing or rales.   Neurological:      General: No focal deficit present.      Mental Status: She is alert and oriented to person, place, and time. Mental status is at baseline.   Psychiatric:         Mood and Affect: Mood normal.         Thought Content: Thought content normal.          ASSESSMENT/PLAN: Annual wellness visit.  Order given for screening mammogram.  Labs as noted are up-to-date.    Diabetes mellitus type 2.  Agree with endocrinology evaluation.  Follow diabetic diet and exercise regularly.  Eye examinations up-to-date.    Hypertension with low reading today.  Monitor home blood pressures.  Patient is asymptomatic and denies any lightheadedness or dizziness.    Unsteady gait.  Recommend physical therapy evaluation and treat.    Thyroid nodule per CT of chest.  Schedule for thyroid  ultrasound.    COPD managed by pulmonary    Sleep apnea managed by pulmonary    Coronary artery disease followed by cardiology    Chronic kidney disease followed by nephrology    Hyperlipidemia.  Continue simvastatin daily.    Colonoscopy up-to-date.   Discuss RSV vaccination at next visit  Follow-up in 4 months and call as needed    Scribe Attestation  By signing my name below, I, Ava Del Cid LPN, Scribe   attest that this documentation has been prepared under the direction and in the presence of Henrry Gallegos MD.

## 2024-06-27 ENCOUNTER — TELEPHONE (OUTPATIENT)
Dept: PRIMARY CARE | Facility: CLINIC | Age: 69
End: 2024-06-27

## 2024-06-27 ENCOUNTER — HOSPITAL ENCOUNTER (OUTPATIENT)
Dept: RADIOLOGY | Facility: HOSPITAL | Age: 69
Discharge: HOME | End: 2024-06-27
Payer: MEDICARE

## 2024-06-27 DIAGNOSIS — Z00.00 HEALTHCARE MAINTENANCE: ICD-10-CM

## 2024-06-27 DIAGNOSIS — E04.1 THYROID NODULE: ICD-10-CM

## 2024-06-27 PROCEDURE — 76536 US EXAM OF HEAD AND NECK: CPT | Performed by: RADIOLOGY

## 2024-06-27 PROCEDURE — 76536 US EXAM OF HEAD AND NECK: CPT

## 2024-06-27 ASSESSMENT — ENCOUNTER SYMPTOMS
RESPIRATORY NEGATIVE: 1
ENDOCRINE NEGATIVE: 1
HEMATOLOGIC/LYMPHATIC NEGATIVE: 1
NEUROLOGICAL NEGATIVE: 1
MUSCULOSKELETAL NEGATIVE: 1
CARDIOVASCULAR NEGATIVE: 1
ALLERGIC/IMMUNOLOGIC NEGATIVE: 1
PSYCHIATRIC NEGATIVE: 1
CONSTITUTIONAL NEGATIVE: 1
EYES NEGATIVE: 1
GASTROINTESTINAL NEGATIVE: 1

## 2024-06-27 NOTE — TELEPHONE ENCOUNTER
Patient would like an order for an handicap placard for parking with expiration date on it. Please call when ready to .

## 2024-06-28 ENCOUNTER — TELEPHONE (OUTPATIENT)
Dept: CARDIOLOGY | Facility: CLINIC | Age: 69
End: 2024-06-28
Payer: MEDICARE

## 2024-06-28 NOTE — TELEPHONE ENCOUNTER
----- Message from Flavio Cox MD sent at 6/27/2024  5:30 PM EDT -----  Stress test reviewed and is normal.  Normal blood flow to the heart.  Normal pumping function.  Nothing to suggest any significant heart artery blockage.  Continue same and follow-up as scheduled.  Thanks.

## 2024-07-01 ENCOUNTER — PROCEDURE VISIT (OUTPATIENT)
Age: 69
End: 2024-07-01
Payer: MEDICARE

## 2024-07-01 VITALS
BODY MASS INDEX: 34.82 KG/M2 | SYSTOLIC BLOOD PRESSURE: 102 MMHG | DIASTOLIC BLOOD PRESSURE: 70 MMHG | WEIGHT: 209 LBS | OXYGEN SATURATION: 99 % | HEART RATE: 65 BPM | HEIGHT: 65 IN | TEMPERATURE: 97.1 F

## 2024-07-01 DIAGNOSIS — M47.812 CERVICAL SPONDYLOSIS: ICD-10-CM

## 2024-07-01 DIAGNOSIS — M47.817 LUMBOSACRAL SPONDYLOSIS WITHOUT MYELOPATHY: Primary | ICD-10-CM

## 2024-07-01 DIAGNOSIS — G89.4 CHRONIC PAIN SYNDROME: ICD-10-CM

## 2024-07-01 PROCEDURE — 64493 INJ PARAVERT F JNT L/S 1 LEV: CPT | Performed by: PAIN MEDICINE

## 2024-07-01 PROCEDURE — 99214 OFFICE O/P EST MOD 30 MIN: CPT | Performed by: PAIN MEDICINE

## 2024-07-01 PROCEDURE — 64494 INJ PARAVERT F JNT L/S 2 LEV: CPT | Performed by: PAIN MEDICINE

## 2024-07-01 RX ORDER — LIDOCAINE HYDROCHLORIDE 10 MG/ML
3 INJECTION, SOLUTION EPIDURAL; INFILTRATION; INTRACAUDAL; PERINEURAL ONCE
Status: COMPLETED | OUTPATIENT
Start: 2024-07-01 | End: 2024-07-01

## 2024-07-01 RX ORDER — TRAMADOL HYDROCHLORIDE 50 MG/1
50 TABLET ORAL DAILY PRN
Qty: 30 TABLET | Refills: 0 | Status: SHIPPED | OUTPATIENT
Start: 2024-07-01 | End: 2024-07-31

## 2024-07-01 RX ORDER — BETAMETHASONE SODIUM PHOSPHATE AND BETAMETHASONE ACETATE 3; 3 MG/ML; MG/ML
6 INJECTION, SUSPENSION INTRA-ARTICULAR; INTRALESIONAL; INTRAMUSCULAR; SOFT TISSUE ONCE
Status: COMPLETED | OUTPATIENT
Start: 2024-07-01 | End: 2024-07-01

## 2024-07-01 RX ADMIN — LIDOCAINE HYDROCHLORIDE 3 ML: 10 INJECTION, SOLUTION EPIDURAL; INFILTRATION; INTRACAUDAL; PERINEURAL at 11:11

## 2024-07-01 RX ADMIN — BETAMETHASONE ACETATE AND BETAMETHASONE SODIUM PHOSPHATE 6 MG: 3; 3 INJECTION, SUSPENSION INTRA-ARTICULAR; INTRALESIONAL; INTRAMUSCULAR; SOFT TISSUE at 11:11

## 2024-07-01 NOTE — TELEPHONE ENCOUNTER
----- Message from Henrry Gallegos MD sent at 7/1/2024 12:29 PM EDT -----  Thyroid US shows several nodules which are stable in size

## 2024-07-01 NOTE — PROGRESS NOTES
[facility]  [address], [address]  [place], [place]  P: [phone]  F: [phone]      LUMBAR MEDIAL BRANCH BLOCK      Provider: TANYA NJ DO          Patient Name: Graciela Reddy : 1955        Date: 2024       Graciela Reddy is here today for interventional pain management.  Standard ASI guidelines were followed and sterile technique used.  Area was cleaned with Betadine x3.  Informed consent was obtained.  Fluoroscopic guidance was used for this procedure. Junction of superior articular process and transverse process was identified. For L5 dorsal primary ramus groove of sacral ala and SAP of S1 was identified. Appropriate length spinal needle was used and advanced to correct anatomic location. Negative aspiration was achieved.  At each site approximately 1/2cc of 1% preservative free Lidocaine was injected without difficulty.  6 mg Celestone added    Patient tolerated the procedure well, no obvious complications occurred during the procedure.  Patient was appropriately monitored and discharged home in stable condition with their usual motor strength. The patient will keep close track of pain over the next several hours and call our office tomorrow and let us know what percentage of pain relief is experienced.  Post op Instructions were given to patient. Relevant and recent imaging reviewed with patient today.                  [] Bilateral [] T12 [] S1      [] L1 [] S2     [x] Right [] L2 [] S3      [x] L3      [] Left [x] L4         [x] L5 [] S.I.                       Patient had >80% pain relief following procedure with positive facet joint provocative maneuvers, schedule second MBB    TANYA NJ DO         
E11.65.  May substitute for generic or insurance covered product 1 kit 0    Lidocaine 4 % OINT Apply 1 application  topically in the morning and at bedtime 150 g 0    allopurinol (ZYLOPRIM) 100 MG tablet       ALPRAZolam (XANAX) 0.5 MG tablet Take by mouth.      calcium carb-cholecalciferol 600-10 MG-MCG TABS per tab       Calcium Carbonate-Vitamin D 600-5 MG-MCG TABS Take by mouth      clopidogrel (PLAVIX) 75 MG tablet Take 1 tablet by mouth daily      dicyclomine (BENTYL) 20 MG tablet Take by mouth      fluticasone-salmeterol (ADVAIR) 500-50 MCG/ACT AEPB diskus inhaler Inhale 1 puff into the lungs 2 times daily      gabapentin (NEURONTIN) 300 MG capsule TAKE 2 CAPSULES BY MOUTH 3 TIMES A DAY       Current Facility-Administered Medications on File Prior to Visit   Medication Dose Route Frequency Provider Last Rate Last Admin    iopamidol (ISOVUE-300) 61 % injection 2 mL  2 mL Other ONCE PRN Edna Le MD               HPI    Review of Systems   All other systems reviewed and are negative.        Objective:     Vitals:  /78   Pulse 65   Temp 97.1 °F (36.2 °C)   Ht 1.651 m (5' 5\")   Wt 94.8 kg (209 lb)   SpO2 99%   BMI 34.78 kg/m²        Physical Exam  Patient alert and oriented times three, recent and remote memory intact, mood and affect normal, judgement and insight normal. Strength functional for ambulation. Balance and coordinational functional. Visualized skin intact. No visualized cyanosis, pulses intact. Cranial nerves 2-12 grossly intact. No obvious upper motor neuron signs seen. Sensation intact to light touch.  SLR negative.   Tender along lumber facet joints with pain and decreased ROM.  Extension and rotation with muscle spasms.        Assessment:         Plan:

## 2024-07-10 ENCOUNTER — TELEPHONE (OUTPATIENT)
Dept: DIABETES SERVICES | Age: 69
End: 2024-07-10

## 2024-07-12 ENCOUNTER — TELEPHONE (OUTPATIENT)
Dept: DIABETES SERVICES | Age: 69
End: 2024-07-12

## 2024-07-12 DIAGNOSIS — N18.30 CHRONIC KIDNEY DISEASE, STAGE 3 UNSPECIFIED (MULTI): ICD-10-CM

## 2024-07-15 RX ORDER — DAPAGLIFLOZIN 10 MG/1
10 TABLET, FILM COATED ORAL
Qty: 90 TABLET | Refills: 3 | Status: SHIPPED | OUTPATIENT
Start: 2024-07-15

## 2024-07-17 DIAGNOSIS — E78.2 DM TYPE 2 WITH DIABETIC MIXED HYPERLIPIDEMIA (MULTI): Primary | ICD-10-CM

## 2024-07-17 DIAGNOSIS — E11.69 DM TYPE 2 WITH DIABETIC MIXED HYPERLIPIDEMIA (MULTI): Primary | ICD-10-CM

## 2024-07-18 RX ORDER — SITAGLIPTIN 50 MG/1
50 TABLET, FILM COATED ORAL DAILY
Qty: 90 TABLET | Refills: 1 | OUTPATIENT
Start: 2024-07-18

## 2024-08-05 ENCOUNTER — TELEPHONE (OUTPATIENT)
Dept: ENDOCRINOLOGY | Age: 69
End: 2024-08-05

## 2024-08-05 DIAGNOSIS — Z79.4 TYPE 2 DIABETES MELLITUS WITH HYPERGLYCEMIA, WITH LONG-TERM CURRENT USE OF INSULIN (HCC): ICD-10-CM

## 2024-08-05 DIAGNOSIS — R73.9 HYPERGLYCEMIA: ICD-10-CM

## 2024-08-05 DIAGNOSIS — E11.65 TYPE 2 DIABETES MELLITUS WITH HYPERGLYCEMIA, WITH LONG-TERM CURRENT USE OF INSULIN (HCC): ICD-10-CM

## 2024-08-05 LAB
ALBUMIN SERPL-MCNC: 4.2 G/DL (ref 3.5–4.6)
ALP SERPL-CCNC: 111 U/L (ref 40–130)
ALT SERPL-CCNC: 14 U/L (ref 0–33)
ANION GAP SERPL CALCULATED.3IONS-SCNC: 14 MEQ/L (ref 9–15)
AST SERPL-CCNC: 12 U/L (ref 0–35)
BILIRUB SERPL-MCNC: 0.3 MG/DL (ref 0.2–0.7)
BUN SERPL-MCNC: 39 MG/DL (ref 8–23)
CALCIUM SERPL-MCNC: 9.9 MG/DL (ref 8.5–9.9)
CHLORIDE SERPL-SCNC: 95 MEQ/L (ref 95–107)
CO2 SERPL-SCNC: 23 MEQ/L (ref 20–31)
CREAT SERPL-MCNC: 1.75 MG/DL (ref 0.5–0.9)
GLOBULIN SER CALC-MCNC: 2.8 G/DL (ref 2.3–3.5)
GLUCOSE SERPL-MCNC: 459 MG/DL (ref 70–99)
POTASSIUM SERPL-SCNC: 4.4 MEQ/L (ref 3.4–4.9)
PROT SERPL-MCNC: 7 G/DL (ref 6.3–8)
SODIUM SERPL-SCNC: 132 MEQ/L (ref 135–144)

## 2024-08-06 ENCOUNTER — OFFICE VISIT (OUTPATIENT)
Dept: ENDOCRINOLOGY | Age: 69
End: 2024-08-06
Payer: MEDICARE

## 2024-08-06 VITALS
SYSTOLIC BLOOD PRESSURE: 108 MMHG | DIASTOLIC BLOOD PRESSURE: 66 MMHG | HEART RATE: 70 BPM | HEIGHT: 65 IN | OXYGEN SATURATION: 94 % | BODY MASS INDEX: 33.49 KG/M2 | WEIGHT: 201 LBS

## 2024-08-06 DIAGNOSIS — E11.65 TYPE 2 DIABETES MELLITUS WITH HYPERGLYCEMIA, WITH LONG-TERM CURRENT USE OF INSULIN (HCC): Primary | ICD-10-CM

## 2024-08-06 DIAGNOSIS — Z79.4 TYPE 2 DIABETES MELLITUS WITH HYPERGLYCEMIA, WITH LONG-TERM CURRENT USE OF INSULIN (HCC): Primary | ICD-10-CM

## 2024-08-06 LAB
C PEPTIDE SERPL-MCNC: 7.5 NG/ML (ref 1.1–4.4)
CHP ED QC CHECK: NORMAL
GLUCOSE BLD-MCNC: 537 MG/DL

## 2024-08-06 PROCEDURE — 82962 GLUCOSE BLOOD TEST: CPT | Performed by: PHYSICIAN ASSISTANT

## 2024-08-06 PROCEDURE — 95251 CONT GLUC MNTR ANALYSIS I&R: CPT | Performed by: PHYSICIAN ASSISTANT

## 2024-08-06 PROCEDURE — 1123F ACP DISCUSS/DSCN MKR DOCD: CPT | Performed by: PHYSICIAN ASSISTANT

## 2024-08-06 PROCEDURE — 99214 OFFICE O/P EST MOD 30 MIN: CPT | Performed by: PHYSICIAN ASSISTANT

## 2024-08-06 RX ORDER — INSULIN ASPART 100 [IU]/ML
INJECTION, SOLUTION INTRAVENOUS; SUBCUTANEOUS
Qty: 45 ML | Refills: 3 | Status: SHIPPED | OUTPATIENT
Start: 2024-08-06

## 2024-08-06 RX ORDER — INSULIN GLARGINE 100 [IU]/ML
INJECTION, SOLUTION SUBCUTANEOUS
Qty: 5 ADJUSTABLE DOSE PRE-FILLED PEN SYRINGE | Refills: 3 | Status: SHIPPED | OUTPATIENT
Start: 2024-08-06

## 2024-08-06 RX ORDER — FLUCONAZOLE 150 MG/1
150 TABLET ORAL DAILY PRN
Qty: 7 TABLET | Refills: 0 | Status: SHIPPED | OUTPATIENT
Start: 2024-08-06 | End: 2024-08-13

## 2024-08-06 ASSESSMENT — ENCOUNTER SYMPTOMS
SORE THROAT: 0
ABDOMINAL PAIN: 0
WHEEZING: 0
DIARRHEA: 0
SINUS PRESSURE: 0
RHINORRHEA: 0
COUGH: 0
BLURRED VISION: 1
SHORTNESS OF BREATH: 0
VOMITING: 0
NAUSEA: 0

## 2024-08-06 NOTE — PATIENT INSTRUCTIONS
Endocrinology-    Check your blood sugars 4 times a day, before meals and at night  Document these numbers in a blood glucose log and bring them with you to your follow-up appointment.  If you are prescribed insulin, Do not take your mealtime insulin if your blood sugars less than 120   Call our office if you have blood sugars less than 80 or greater then 300 on two or more occasions  Call our office if you have any questions regarding your blood sugars or insulin dosing regiment  Signs of low blood sugar may include tremors, feeling shaky, sweating, dizziness, confusion and weakness. Check your blood sugar immediatly if you have any of these symptoms.     The plan as discussed at your appointment-    Increase Novolog inject 30 units 3x daily 15-20 minutes before meals   Increase Lantus 35 units injected twice daily after breakfast and before bed  Hold Farxiga 10 mg by mouth daily  Dexcom G7 Being used- [employer]   Repeat labs 1 week before your follow-up appointment  Follow-up with me in 6 weeks    You have been prescribed the Dexcom G7 continuous glucose monitor (CGM).  This device reads your glucose levels in the interstitial fluid under your skin. This is not a blood glucose monitor.  The concentration of glucose in your blood is sometimes slightly higher than the concentration of glucose in the fluid under your skin.  This is a normal variation and is usually only a 5-15 difference.  You may notice a greater difference in the numbers between the blood and the device immediately after eating or activity.  This again is a normal variance and the levels will usually equal out over time.  You can use the number on the Dexcom CGM to monitor your glucose and take your medications or insulin.  If, for example, you get a low or high reading on the Dexcom and are asymptomatic, do a fingerstick and check your blood glucose to verify.  If there is a large discrepancy, use the blood glucose number and treat

## 2024-08-06 NOTE — PROGRESS NOTES
2024    Assessment:       Diagnosis Orders   1. Type 2 diabetes mellitus with hyperglycemia, with long-term current use of insulin ([facility])  Comprehensive Metabolic Panel    Hemoglobin A1C    POCT Glucose    SC CONTINUOUS GLUCOSE MONITORING ANALYSIS I&R    Microalbumin / Creatinine Urine Ratio    Lipid Panel    insulin glargine (LANTUS SOLOSTAR) 100 UNIT/ML injection pen    Insulin Pen Needle 32G X 6 MM MISC        PLAN:     Increase Novolog inject 30 units 3x daily 15-20 minutes before meals   Increase Lantus 35 units injected twice daily after breakfast and before bed  Hold Farxiga 10 mg by mouth daily  Diflucan 150 mg daily   Dexcom G7 Being used- [employer]   Repeat labs 1 week before your follow-up appointment  Follow-up with me in 6 weeks    Orders Placed This Encounter   Procedures    Comprehensive Metabolic Panel     Standing Status:   Future     Standing Expiration Date:   2025    Hemoglobin A1C     Standing Status:   Future     Standing Expiration Date:   2025    Microalbumin / Creatinine Urine Ratio     Standing Status:   Future     Standing Expiration Date:   2025    Lipid Panel     Standing Status:   Future     Standing Expiration Date:   2025    POCT Glucose    SC CONTINUOUS GLUCOSE MONITORING ANALYSIS I&R     Orders Placed This Encounter   Medications    insulin aspart (NOVOLOG FLEXPEN) 100 UNIT/ML injection pen     Sig: Inject 30 units tid 15-20 minutes before meals     Dispense:  45 mL     Refill:  3    insulin glargine (LANTUS SOLOSTAR) 100 UNIT/ML injection pen     Sig: Inject 35 units twice daily after breakfast and before bed     Dispense:  5 Adjustable Dose Pre-filled Pen Syringe     Refill:  3    Insulin Pen Needle 32G X 6 MM MISC     Si Device by Does not apply route 5 times daily     Dispense:  150 each     Refill:  3    fluconazole (DIFLUCAN) 150 MG tablet     Sig: Take 1 tablet by mouth daily as needed (Vaginal yeast infection)     Dispense:  7 tablet

## 2024-08-09 DIAGNOSIS — Z98.61 CORONARY ANGIOPLASTY STATUS: ICD-10-CM

## 2024-08-09 DIAGNOSIS — I25.10 ATHEROSCLEROTIC HEART DISEASE OF NATIVE CORONARY ARTERY WITHOUT ANGINA PECTORIS: ICD-10-CM

## 2024-08-13 RX ORDER — METOPROLOL SUCCINATE 50 MG/1
TABLET, EXTENDED RELEASE ORAL
Qty: 135 TABLET | Refills: 1 | OUTPATIENT
Start: 2024-08-13

## 2024-08-13 NOTE — TELEPHONE ENCOUNTER
"Spoke with PT who confirms current dosage metoprolol succ 75mg daily is correct (she takes 50mg in am 25mg in pm)  She reports she does not need refill at this time as she \"has plenty\".  "

## 2024-08-13 NOTE — TELEPHONE ENCOUNTER
Forms started in last office visit. Pended due to question regarding ethnicity.    To Dr. Lowry- please advise.    Pt set up rfa

## 2024-08-14 DIAGNOSIS — G45.0 VERTEBRO-BASILAR ARTERY SYNDROME: ICD-10-CM

## 2024-08-14 LAB
ESTIMATED AVERAGE GLUCOSE: NORMAL
HBA1C MFR BLD: NORMAL %

## 2024-08-17 RX ORDER — SERTRALINE HYDROCHLORIDE 100 MG/1
150 TABLET, FILM COATED ORAL DAILY
Qty: 135 TABLET | Refills: 1 | Status: SHIPPED | OUTPATIENT
Start: 2024-08-17

## 2024-08-18 DIAGNOSIS — M54.9 DORSALGIA, UNSPECIFIED: ICD-10-CM

## 2024-08-18 DIAGNOSIS — G89.29 OTHER CHRONIC PAIN: ICD-10-CM

## 2024-08-20 RX ORDER — GABAPENTIN 300 MG/1
600 CAPSULE ORAL 3 TIMES DAILY
Qty: 180 CAPSULE | Refills: 1 | Status: SHIPPED | OUTPATIENT
Start: 2024-08-20

## 2024-09-09 ENCOUNTER — APPOINTMENT (OUTPATIENT)
Dept: UROLOGY | Facility: CLINIC | Age: 69
End: 2024-09-09
Payer: MEDICARE

## 2024-09-11 DIAGNOSIS — Z98.61 CAD S/P PERCUTANEOUS CORONARY ANGIOPLASTY: ICD-10-CM

## 2024-09-11 DIAGNOSIS — I10 ESSENTIAL HYPERTENSION: ICD-10-CM

## 2024-09-11 DIAGNOSIS — I25.10 CAD S/P PERCUTANEOUS CORONARY ANGIOPLASTY: ICD-10-CM

## 2024-09-13 RX ORDER — POTASSIUM CHLORIDE 750 MG/1
10 TABLET, EXTENDED RELEASE ORAL DAILY
Qty: 90 TABLET | Refills: 1 | Status: SHIPPED | OUTPATIENT
Start: 2024-09-13

## 2024-09-13 RX ORDER — CLOPIDOGREL BISULFATE 75 MG/1
75 TABLET ORAL DAILY
Qty: 90 TABLET | Refills: 1 | Status: SHIPPED | OUTPATIENT
Start: 2024-09-13

## 2024-09-17 ENCOUNTER — TELEPHONE (OUTPATIENT)
Dept: ENDOCRINOLOGY | Age: 69
End: 2024-09-17

## 2024-09-17 DIAGNOSIS — E11.65 TYPE 2 DIABETES MELLITUS WITH HYPERGLYCEMIA, WITH LONG-TERM CURRENT USE OF INSULIN (HCC): ICD-10-CM

## 2024-09-17 DIAGNOSIS — Z79.4 TYPE 2 DIABETES MELLITUS WITH HYPERGLYCEMIA, WITH LONG-TERM CURRENT USE OF INSULIN (HCC): ICD-10-CM

## 2024-09-17 LAB
ALBUMIN SERPL-MCNC: 4.1 G/DL (ref 3.5–4.6)
ALP SERPL-CCNC: 116 U/L (ref 40–130)
ALT SERPL-CCNC: 13 U/L (ref 0–33)
ANION GAP SERPL CALCULATED.3IONS-SCNC: 14 MEQ/L (ref 9–15)
AST SERPL-CCNC: 15 U/L (ref 0–35)
BILIRUB SERPL-MCNC: 0.3 MG/DL (ref 0.2–0.7)
BUN SERPL-MCNC: 35 MG/DL (ref 8–23)
CALCIUM SERPL-MCNC: 9.7 MG/DL (ref 8.5–9.9)
CHLORIDE SERPL-SCNC: 94 MEQ/L (ref 95–107)
CHOLEST SERPL-MCNC: 160 MG/DL (ref 0–199)
CO2 SERPL-SCNC: 25 MEQ/L (ref 20–31)
CREAT SERPL-MCNC: 1.66 MG/DL (ref 0.5–0.9)
CREAT UR-MCNC: 91.1 MG/DL
GLOBULIN SER CALC-MCNC: 2.7 G/DL (ref 2.3–3.5)
GLUCOSE SERPL-MCNC: 414 MG/DL (ref 70–99)
HDLC SERPL-MCNC: 31 MG/DL (ref 40–59)
LDLC SERPL CALC-MCNC: 73 MG/DL (ref 0–129)
MICROALBUMIN UR-MCNC: <1.2 MG/DL
MICROALBUMIN/CREAT UR-RTO: NORMAL MG/G (ref 0–30)
POTASSIUM SERPL-SCNC: 4.3 MEQ/L (ref 3.4–4.9)
PROT SERPL-MCNC: 6.8 G/DL (ref 6.3–8)
SODIUM SERPL-SCNC: 133 MEQ/L (ref 135–144)
TRIGL SERPL-MCNC: 282 MG/DL (ref 0–150)

## 2024-09-18 LAB
ESTIMATED AVERAGE GLUCOSE: 410 MG/DL
HBA1C MFR BLD: 15.9 % (ref 4–6)

## 2024-09-20 DIAGNOSIS — Z00.00 ENCOUNTER FOR GENERAL ADULT MEDICAL EXAMINATION WITHOUT ABNORMAL FINDINGS: ICD-10-CM

## 2024-09-24 RX ORDER — LISINOPRIL 20 MG/1
20 TABLET ORAL DAILY
Qty: 90 TABLET | Refills: 1 | Status: SHIPPED | OUTPATIENT
Start: 2024-09-24

## 2024-09-25 ENCOUNTER — OFFICE VISIT (OUTPATIENT)
Dept: ENDOCRINOLOGY | Age: 69
End: 2024-09-25

## 2024-09-25 VITALS
WEIGHT: 198 LBS | SYSTOLIC BLOOD PRESSURE: 114 MMHG | DIASTOLIC BLOOD PRESSURE: 66 MMHG | HEART RATE: 68 BPM | OXYGEN SATURATION: 95 % | BODY MASS INDEX: 32.99 KG/M2 | HEIGHT: 65 IN

## 2024-09-25 DIAGNOSIS — E11.65 TYPE 2 DIABETES MELLITUS WITH HYPERGLYCEMIA, WITH LONG-TERM CURRENT USE OF INSULIN (HCC): Primary | ICD-10-CM

## 2024-09-25 DIAGNOSIS — Z79.4 TYPE 2 DIABETES MELLITUS WITH HYPERGLYCEMIA, WITH LONG-TERM CURRENT USE OF INSULIN (HCC): Primary | ICD-10-CM

## 2024-09-25 DIAGNOSIS — E04.2 MULTINODULAR THYROID: ICD-10-CM

## 2024-09-25 LAB
CHP ED QC CHECK: NORMAL
GLUCOSE BLD-MCNC: 468 MG/DL

## 2024-09-25 RX ORDER — INSULIN GLARGINE 100 [IU]/ML
INJECTION, SOLUTION SUBCUTANEOUS
Qty: 15 ML | Refills: 3 | Status: SHIPPED | OUTPATIENT
Start: 2024-09-25

## 2024-09-25 RX ORDER — INSULIN LISPRO 100 [IU]/ML
45 INJECTION, SOLUTION INTRAVENOUS; SUBCUTANEOUS
Qty: 30 ML | Refills: 5 | Status: SHIPPED | OUTPATIENT
Start: 2024-09-25

## 2024-09-25 ASSESSMENT — ENCOUNTER SYMPTOMS
SINUS PRESSURE: 0
BLURRED VISION: 1
SHORTNESS OF BREATH: 0
COUGH: 0
WHEEZING: 0
SORE THROAT: 0
ABDOMINAL PAIN: 0
VOMITING: 0
NAUSEA: 0
DIARRHEA: 0
RHINORRHEA: 0

## 2024-10-16 DIAGNOSIS — G45.0 VERTEBRO-BASILAR ARTERY SYNDROME: ICD-10-CM

## 2024-10-18 DIAGNOSIS — I25.10 ATHEROSCLEROTIC HEART DISEASE OF NATIVE CORONARY ARTERY WITHOUT ANGINA PECTORIS: ICD-10-CM

## 2024-10-18 DIAGNOSIS — Z98.61 CORONARY ANGIOPLASTY STATUS: ICD-10-CM

## 2024-10-18 RX ORDER — MECLIZINE HYDROCHLORIDE 25 MG/1
25 TABLET ORAL 3 TIMES DAILY PRN
Qty: 270 TABLET | Refills: 1 | Status: SHIPPED | OUTPATIENT
Start: 2024-10-18

## 2024-10-21 RX ORDER — SIMVASTATIN 20 MG/1
20 TABLET, FILM COATED ORAL DAILY
Qty: 90 TABLET | Refills: 1 | Status: SHIPPED | OUTPATIENT
Start: 2024-10-21

## 2024-10-23 ENCOUNTER — TELEPHONE (OUTPATIENT)
Dept: PRIMARY CARE | Facility: CLINIC | Age: 69
End: 2024-10-23

## 2024-10-23 ENCOUNTER — APPOINTMENT (OUTPATIENT)
Dept: PRIMARY CARE | Facility: CLINIC | Age: 69
End: 2024-10-23
Payer: MEDICARE

## 2024-10-23 VITALS
HEIGHT: 65 IN | TEMPERATURE: 96.8 F | BODY MASS INDEX: 33.15 KG/M2 | SYSTOLIC BLOOD PRESSURE: 125 MMHG | HEART RATE: 67 BPM | DIASTOLIC BLOOD PRESSURE: 80 MMHG | WEIGHT: 199 LBS

## 2024-10-23 DIAGNOSIS — G89.29 OTHER CHRONIC PAIN: ICD-10-CM

## 2024-10-23 DIAGNOSIS — M54.9 DORSALGIA, UNSPECIFIED: ICD-10-CM

## 2024-10-23 PROCEDURE — 1036F TOBACCO NON-USER: CPT | Performed by: FAMILY MEDICINE

## 2024-10-23 PROCEDURE — 3046F HEMOGLOBIN A1C LEVEL >9.0%: CPT | Performed by: FAMILY MEDICINE

## 2024-10-23 PROCEDURE — 1158F ADVNC CARE PLAN TLK DOCD: CPT | Performed by: FAMILY MEDICINE

## 2024-10-23 PROCEDURE — 3061F NEG MICROALBUMINURIA REV: CPT | Performed by: FAMILY MEDICINE

## 2024-10-23 PROCEDURE — 3079F DIAST BP 80-89 MM HG: CPT | Performed by: FAMILY MEDICINE

## 2024-10-23 PROCEDURE — 1160F RVW MEDS BY RX/DR IN RCRD: CPT | Performed by: FAMILY MEDICINE

## 2024-10-23 PROCEDURE — 1123F ACP DISCUSS/DSCN MKR DOCD: CPT | Performed by: FAMILY MEDICINE

## 2024-10-23 PROCEDURE — 99213 OFFICE O/P EST LOW 20 MIN: CPT | Performed by: FAMILY MEDICINE

## 2024-10-23 PROCEDURE — 4010F ACE/ARB THERAPY RXD/TAKEN: CPT | Performed by: FAMILY MEDICINE

## 2024-10-23 PROCEDURE — 3074F SYST BP LT 130 MM HG: CPT | Performed by: FAMILY MEDICINE

## 2024-10-23 PROCEDURE — 1159F MED LIST DOCD IN RCRD: CPT | Performed by: FAMILY MEDICINE

## 2024-10-23 PROCEDURE — 3008F BODY MASS INDEX DOCD: CPT | Performed by: FAMILY MEDICINE

## 2024-10-23 NOTE — PROGRESS NOTES
Subjective here for a follow-up on hypertension and hyperlipidemia.  She states that overall she is feeling well except for fatigue and tiredness.  She continues on her medications as noted and is currently seeing multiple consultants.  She is seeing an endocrinologist at [facility].  Recent A1c is significantly elevated at 15.9.  Her medications are being adjusted by endocrinology.  She also follows up with cardiology and pulmonology as well.  Eye examinations are up-to-date.  She does note some thinning of her hair for the last several months.  He has no other complaints at the present time    Patient ID: Catherine Patel is a 69 y.o. female who presents for Follow-up (4 month follow up. ):    Problem List Items Addressed This Visit    None     Past Medical History:   Diagnosis Date    Body mass index (BMI)40.0-44.9, adult 07/29/2022    BMI 40.0-44.9, adult    Carpal tunnel syndrome, left upper limb 09/16/2021    Carpal tunnel syndrome of left wrist    Dizziness and giddiness 09/16/2021    Postural dizziness    History of falling 09/08/2022    Status post fall    Localized edema 07/29/2022    Edema leg    Morbid (severe) obesity due to excess calories (Multi) 09/29/2022    Morbid obesity with BMI of 40.0-44.9, adult    Pain in right hip 01/20/2022    Right hip pain    Personal history of other endocrine, nutritional and metabolic disease 01/20/2022    History of morbid obesity    Personal history of other specified conditions 10/23/2021    History of vertigo    Personal history of other specified conditions 07/13/2022    History of urinary frequency    Personal history of other specified conditions 08/30/2022    History of edema    Personal history of other specified conditions 08/30/2022    History of shortness of breath    Pneumonia, unspecified organism     Pneumonia of right lung due to infectious organism, unspecified part of lung    Shortness of breath 07/29/2022    Shortness of breath on exertion     Syncope and collapse 2021    Near syncope      Past Surgical History:   Procedure Laterality Date    MR HEAD ANGIO WO IV CONTRAST  3/7/2020    MR HEAD ANGIO WO IV CONTRAST 3/7/2020 [facility]    MR NECK ANGIO WO IV CONTRAST  3/7/2020    MR NECK ANGIO WO IV CONTRAST 3/7/2020 [facility]    OTHER SURGICAL HISTORY  2022    Eye surgery    OTHER SURGICAL HISTORY  2022    Appendectomy    OTHER SURGICAL HISTORY  2022    Cyst excision    OTHER SURGICAL HISTORY  2022    Cardiac catheterization with stent placement    OTHER SURGICAL HISTORY  2022    Tonsillectomy with adenoidectomy    OTHER SURGICAL HISTORY  2022    Esophagogastroduodenoscopy    OTHER SURGICAL HISTORY  2022    Ankle surgery    OTHER SURGICAL HISTORY  2022     section    OTHER SURGICAL HISTORY  11/10/2022    Complete colonoscopy      Family History   Problem Relation Name Age of Onset    Arthritis Mother      Hypertension Mother      Lung cancer Mother      COPD Father      Other (CARDIAC DISORDER) Father      Hyperlipidemia Father      Hypertension Father      Diabetes Father      Other (MESONEPHROMA, BENIGN) Father      Colonic polyp Sister      Thyroid cancer Sister      Other (HEART VALVE REPLACED) Father's Sister        Social History     Socioeconomic History    Marital status:      Spouse name: Not on file    Number of children: Not on file    Years of education: Not on file    Highest education level: Not on file   Occupational History    Not on file   Tobacco Use    Smoking status: Former     Types: Cigarettes    Smokeless tobacco: Never   Substance and Sexual Activity    Alcohol use: Not Currently    Drug use: Never    Sexual activity: Not on file   Other Topics Concern    Not on file   Social History Narrative    Not on file     Social Drivers of Health     Financial Resource Strain: Not on file   Food Insecurity: Not on file   Transportation Needs: Not on file    Physical Activity: Not on file   Stress: Not on file   Social Connections: Not on file   Intimate Partner Violence: Not on file   Housing Stability: Not on file      Erythromycin, Hydroxychloroquine, Penicillins, Adhesive tape-silicones, Amlodipine, and Atorvastatin   Current Outpatient Medications   Medication Sig Dispense Refill    albuterol 90 mcg/actuation inhaler INHALE 1 TO 2 PUFFS BY MOUTH EVERY 4 TO 6 HOURS AS NEEDED 18 g 3    alcohol swabs (Alcohol Pads) pads, medicated Apply 1 Swab topically once daily. 100 each 3    allopurinol (Zyloprim) 100 mg tablet TAKE 1 TABLET BY MOUTH EVERY DAY 90 tablet 1    Blood glucose monitoring meter kit (Blood Glucose Monitoring) kit 1 each once daily. Use to test blood sugar once daily and as needed 1 kit 1    blood sugar diagnostic (Blood Glucose Test) strip 1 strip once daily. 100 strip 3    calcium carbonate-vitamin D3 (Calcium 600 + D,3,) 600 mg-10 mcg (400 unit) tablet Take 1 tablet by mouth once daily.      clopidogrel (Plavix) 75 mg tablet TAKE 1 TABLET DAILY 90 tablet 1    clotrimazole-betamethasone (Lotrisone) cream Twice daily to area of irritation x 2 weeks, then 2-3 x per week (Patient taking differently: Apply 1 Application topically 2 times a day. Twice daily to area of irritation x 2 weeks, then 2-3 x per week) 15 g 0    cyanocobalamin (Vitamin B-12) 1,000 mcg tablet Take 1 tablet (1,000 mcg) by mouth once daily. TAKE PER DIRECTED      dicyclomine (Bentyl) 20 mg tablet Take 1 tablet (20 mg) by mouth 3 times a day. TAKE PER DIRECTED      ergocalciferol (Vitamin D-2) 1.25 MG (77054 UT) capsule Take 1 capsule (50,000 Units) by mouth every 14 (fourteen) days. 6 capsule 2    estradiol (Estrace) 0.01 % (0.1 mg/gram) vaginal cream Apply nightly 1 gram for 2 weeks, then 3 times per week. 42.5 g 5    Farxiga 10 mg TAKE 1 TABLET BY MOUTH EVERY DAY IN THE MORNING 90 tablet 3    fexofenadine (Allegra) 180 mg tablet Take 1 tablet (180 mg) by mouth once daily. 90 tablet  1    fluticasone (Flonase) 50 mcg/actuation nasal spray USE 2 SPRAYS INTO EACH NOSTRIL ONCE DAILY 48 g 1    furosemide (Lasix) 20 mg tablet TAKE 1 TABLET BY MOUTH TWICE A  tablet 3    gabapentin (Neurontin) 300 mg capsule TAKE 2 CAPSULES BY MOUTH 3 TIMES A  capsule 1    icosapent ethyL (Vascepa) 1 gram capsule Take 1 capsule (1 g) by mouth 2 times daily (morning and late afternoon). 180 capsule 3    insulin aspart (NovoLOG Flexpen U-100 Insulin) 100 unit/mL (3 mL) pen Inject 34 Units under the skin 3 times a day before meals. Take as directed per insulin instructions.      insulin glargine (Lantus Solostar U-100 Insulin) 100 unit/mL (3 mL) pen Inject 60 Units under the skin 2 times a day. Take as directed per insulin instructions.      ipratropium-albuteroL (Duo-Neb) 0.5-2.5 mg/3 mL nebulizer solution Inhale. TAKE PER DIRECTED      lancets misc Use to test blood sugar once daily 100 each 3    lisinopril 20 mg tablet Take 1 tablet (20 mg) by mouth once daily. 90 tablet 1    loperamide (Imodium A-D) 2 mg capsule Take 1 capsule (2 mg) by mouth once daily as needed. TAKE PER DIRECTED      meclizine (Antivert) 25 mg tablet TAKE 1 TABLET BY MOUTH THREE TIMES A DAY AS NEEDED 270 tablet 1    metoprolol succinate XL (Toprol-XL) 50 mg 24 hr tablet TAKE 1 TABLET IN IN THE MORNING AND 1/2 TABLET IN IN THE EVENING FOR TOTAL OF 75MG DAILY (Patient taking differently: Take 1.5 tablets (75 mg) by mouth once daily. TAKE 1 TABLET IN IN THE MORNING AND 1/2 TABLET IN IN THE EVENING  FOR TOTAL OF 75MG DAILY) 135 tablet 1    nitroglycerin (Nitrostat) 0.4 mg SL tablet Place 1 tablet (0.4 mg) under the tongue every 5 minutes if needed for chest pain.      oxybutynin XL (Ditropan XL) 5 mg 24 hr tablet Take 1 tablet (5 mg) by mouth once daily. Do not crush, chew, or split. 90 tablet 3    pantoprazole (ProtoNix) 40 mg EC tablet Take 1 tablet (40 mg) by mouth 2 times a day.      potassium chloride CR 10 mEq ER tablet TAKE 1  TABLET DAILY 90 tablet 1    propranolol LA (Inderal LA) 60 mg 24 hr capsule Take 1 capsule (60 mg) by mouth once daily.      sertraline (Zoloft) 100 mg tablet TAKE 1 AND 1/2 TABLETS DAILY BY MOUTH 135 tablet 1    Sharps Container 1 each once daily. 1 each 3    simvastatin (Zocor) 20 mg tablet TAKE 1 TABLET BY MOUTH EVERY DAY 90 tablet 1    Spiriva Respimat 1.25 mcg/actuation inhaler Inhale 2 puffs once daily.      Wixela Inhub 250-50 mcg/dose diskus inhaler TAKE 1 PUFF BY MOUTH TWICE A  each 1     No current facility-administered medications for this visit.       Immunization History   Administered Date(s) Administered    Flu vaccine (IIV4), preservative free *Check age/dose* 10/19/2016, 10/03/2017, 10/01/2019    Flu vaccine, quadrivalent, high-dose, preservative free, age 65y+ (FLUZONE) 12/23/2021, 02/02/2023    Flu vaccine, quadrivalent, no egg protein, age 6 month or greater (FLUCELVAX) 12/09/2018    Flu vaccine, quadrivalent, recombinant, preservative free, adult (FLUBLOK) 11/06/2019    Flu vaccine, trivalent, preservative free, age 6 months and greater (Fluarix/Fluzone/Flulaval) 12/02/2015    Hepatitis B vaccine, adult *Check Product/Dose* 09/13/2010, 10/18/2010, 04/08/2011    Influenza, Unspecified 09/01/2013, 10/13/2014    Influenza, injectable, quadrivalent 10/13/2020    PPD Test 05/16/2011    Pfizer Purple Cap SARS-CoV-2 04/17/2021, 05/08/2021, 12/23/2021    Pneumococcal conjugate vaccine, 13-valent (PREVNAR 13) 05/31/2022    Pneumococcal polysaccharide vaccine, 23-valent, age 2 years and older (PNEUMOVAX 23) 03/11/2016    Tdap vaccine, age 7 year and older (BOOSTRIX, ADACEL) 10/17/2017    Zoster vaccine, recombinant, adult (SHINGRIX) 11/06/2019, 10/13/2020, 02/06/2021    Zoster, Unspecified 10/01/2019        Review of Systems   Constitutional:  Positive for fatigue.   HENT: Negative.     Eyes: Negative.    Respiratory: Negative.     Cardiovascular: Negative.    Gastrointestinal: Negative.     Endocrine: Negative.    Genitourinary: Negative.    Musculoskeletal: Negative.    Skin: Negative.    Allergic/Immunologic: Negative.    Neurological: Negative.    Hematological: Negative.    Psychiatric/Behavioral: Negative.     All other systems reviewed and are negative.       Vitals:    10/23/24 1136   BP: 125/80   Pulse: 67   Temp: 36 °C (96.8 °F)     Vitals:    10/23/24 1136   Weight: 90.3 kg (199 lb)      Physical Exam  Constitutional:       General: She is not in acute distress.     Appearance: Normal appearance.   Cardiovascular:      Rate and Rhythm: Normal rate and regular rhythm.      Pulses: Normal pulses.      Heart sounds: Normal heart sounds. No murmur heard.     No friction rub. No gallop.   Pulmonary:      Effort: Pulmonary effort is normal. No respiratory distress.      Breath sounds: Normal breath sounds. No wheezing or rales.   Neurological:      General: No focal deficit present.      Mental Status: She is alert and oriented to person, place, and time. Mental status is at baseline.     Thinning of hair is noted diffusely on the scalp area.    ASSESSMENT/PLAN: Hypertension stable.  Continue current meds.    Uncontrolled diabetes mellitus type 2.  She is urged to follow her diet closely and to follow-up closely with endocrinology.    COPD stable follow-up with pulmonology as recommended.    Hyperlipidemia.  Continue simvastatin daily.    Chronic kidney disease followed by nephrology  Coronary artery disease followed by cardiology    Alopecia.  Recommended that patient discuss this with her endocrinologist and then seek dermatology evaluation if needed.    Exercise regularly  Recommended RSV vaccination  Follow-up 6 months and call as needed       Scribe Attestation  By signing my name below, I, Ava Del Cid LPN, Scribe   attest that this documentation has been prepared under the direction and in the presence of Henrry Gallegos MD.

## 2024-10-23 NOTE — TELEPHONE ENCOUNTER
Patient says she saw you this morning and would like to have a 90 day supply of gabapentin (Neurontin) 300 mg capsule instead of the 60 day supply if possible. RX to be sent to   [address] [address]  Phone: [phone]  Fax: [phone]

## 2024-10-24 ENCOUNTER — APPOINTMENT (OUTPATIENT)
Dept: NEPHROLOGY | Facility: CLINIC | Age: 69
End: 2024-10-24
Payer: MEDICARE

## 2024-10-24 RX ORDER — GABAPENTIN 300 MG/1
600 CAPSULE ORAL 3 TIMES DAILY
Qty: 540 CAPSULE | Refills: 0 | Status: SHIPPED | OUTPATIENT
Start: 2024-10-24

## 2024-10-24 ASSESSMENT — ENCOUNTER SYMPTOMS
FATIGUE: 1
RESPIRATORY NEGATIVE: 1
MUSCULOSKELETAL NEGATIVE: 1
NEUROLOGICAL NEGATIVE: 1
ALLERGIC/IMMUNOLOGIC NEGATIVE: 1
ENDOCRINE NEGATIVE: 1
PSYCHIATRIC NEGATIVE: 1
HEMATOLOGIC/LYMPHATIC NEGATIVE: 1
CARDIOVASCULAR NEGATIVE: 1
EYES NEGATIVE: 1
GASTROINTESTINAL NEGATIVE: 1

## 2024-10-31 ENCOUNTER — APPOINTMENT (OUTPATIENT)
Dept: NEPHROLOGY | Facility: CLINIC | Age: 69
End: 2024-10-31
Payer: MEDICARE

## 2024-10-31 VITALS
WEIGHT: 202 LBS | BODY MASS INDEX: 33.66 KG/M2 | DIASTOLIC BLOOD PRESSURE: 80 MMHG | SYSTOLIC BLOOD PRESSURE: 159 MMHG | HEART RATE: 73 BPM | HEIGHT: 65 IN

## 2024-10-31 DIAGNOSIS — R80.8 OTHER PROTEINURIA: ICD-10-CM

## 2024-10-31 DIAGNOSIS — E08.22 DIABETES MELLITUS DUE TO UNDERLYING CONDITION WITH DIABETIC CHRONIC KIDNEY DISEASE, UNSPECIFIED CKD STAGE, UNSPECIFIED WHETHER LONG TERM INSULIN USE: ICD-10-CM

## 2024-10-31 DIAGNOSIS — N18.32 STAGE 3B CHRONIC KIDNEY DISEASE (MULTI): Primary | ICD-10-CM

## 2024-10-31 DIAGNOSIS — I10 ESSENTIAL HYPERTENSION: ICD-10-CM

## 2024-10-31 PROCEDURE — 99214 OFFICE O/P EST MOD 30 MIN: CPT | Performed by: INTERNAL MEDICINE

## 2024-10-31 PROCEDURE — 4010F ACE/ARB THERAPY RXD/TAKEN: CPT | Performed by: INTERNAL MEDICINE

## 2024-10-31 PROCEDURE — 3046F HEMOGLOBIN A1C LEVEL >9.0%: CPT | Performed by: INTERNAL MEDICINE

## 2024-10-31 PROCEDURE — 3061F NEG MICROALBUMINURIA REV: CPT | Performed by: INTERNAL MEDICINE

## 2024-10-31 PROCEDURE — 1036F TOBACCO NON-USER: CPT | Performed by: INTERNAL MEDICINE

## 2024-10-31 PROCEDURE — 3079F DIAST BP 80-89 MM HG: CPT | Performed by: INTERNAL MEDICINE

## 2024-10-31 PROCEDURE — 3077F SYST BP >= 140 MM HG: CPT | Performed by: INTERNAL MEDICINE

## 2024-10-31 PROCEDURE — 1123F ACP DISCUSS/DSCN MKR DOCD: CPT | Performed by: INTERNAL MEDICINE

## 2024-10-31 PROCEDURE — 3008F BODY MASS INDEX DOCD: CPT | Performed by: INTERNAL MEDICINE

## 2024-10-31 RX ORDER — LISINOPRIL 40 MG/1
40 TABLET ORAL DAILY
Qty: 90 TABLET | Refills: 3 | Status: SHIPPED | OUTPATIENT
Start: 2024-10-31 | End: 2025-10-31

## 2024-10-31 RX ORDER — DAPAGLIFLOZIN 10 MG/1
10 TABLET, FILM COATED ORAL DAILY
Qty: 90 TABLET | Refills: 3 | Status: SHIPPED | OUTPATIENT
Start: 2024-10-31 | End: 2025-10-31

## 2024-11-04 DIAGNOSIS — Z79.4 TYPE 2 DIABETES MELLITUS WITH HYPERGLYCEMIA, WITH LONG-TERM CURRENT USE OF INSULIN (HCC): ICD-10-CM

## 2024-11-04 DIAGNOSIS — E11.65 TYPE 2 DIABETES MELLITUS WITH HYPERGLYCEMIA, WITH LONG-TERM CURRENT USE OF INSULIN (HCC): ICD-10-CM

## 2024-11-04 LAB
CORTISOL COLLECTION INFO: NORMAL
CORTISOL: 17.8 UG/DL (ref 2.5–19.5)

## 2024-11-06 ENCOUNTER — OFFICE VISIT (OUTPATIENT)
Dept: ENDOCRINOLOGY | Age: 69
End: 2024-11-06
Payer: MEDICARE

## 2024-11-06 VITALS
WEIGHT: 198 LBS | HEIGHT: 65 IN | BODY MASS INDEX: 32.99 KG/M2 | DIASTOLIC BLOOD PRESSURE: 74 MMHG | OXYGEN SATURATION: 90 % | SYSTOLIC BLOOD PRESSURE: 146 MMHG | HEART RATE: 76 BPM

## 2024-11-06 DIAGNOSIS — E04.2 MULTINODULAR THYROID: ICD-10-CM

## 2024-11-06 DIAGNOSIS — E11.65 TYPE 2 DIABETES MELLITUS WITH HYPERGLYCEMIA, WITH LONG-TERM CURRENT USE OF INSULIN (HCC): Primary | ICD-10-CM

## 2024-11-06 DIAGNOSIS — Z79.4 TYPE 2 DIABETES MELLITUS WITH HYPERGLYCEMIA, WITH LONG-TERM CURRENT USE OF INSULIN (HCC): Primary | ICD-10-CM

## 2024-11-06 LAB
CHP ED QC CHECK: NORMAL
GLUCOSE BLD-MCNC: 384 MG/DL

## 2024-11-06 PROCEDURE — 82962 GLUCOSE BLOOD TEST: CPT | Performed by: PHYSICIAN ASSISTANT

## 2024-11-06 PROCEDURE — 99214 OFFICE O/P EST MOD 30 MIN: CPT | Performed by: PHYSICIAN ASSISTANT

## 2024-11-06 PROCEDURE — 3046F HEMOGLOBIN A1C LEVEL >9.0%: CPT | Performed by: PHYSICIAN ASSISTANT

## 2024-11-06 PROCEDURE — 1159F MED LIST DOCD IN RCRD: CPT | Performed by: PHYSICIAN ASSISTANT

## 2024-11-06 PROCEDURE — 1123F ACP DISCUSS/DSCN MKR DOCD: CPT | Performed by: PHYSICIAN ASSISTANT

## 2024-11-06 RX ORDER — INSULIN ASPART 100 [IU]/ML
INJECTION, SOLUTION INTRAVENOUS; SUBCUTANEOUS
Qty: 45 ML | Refills: 3
Start: 2024-11-06

## 2024-11-06 RX ORDER — INSULIN HUMAN 500 [IU]/ML
INJECTION, SOLUTION SUBCUTANEOUS
Qty: 15 ML | Refills: 5 | Status: SHIPPED | OUTPATIENT
Start: 2024-11-06

## 2024-11-06 ASSESSMENT — ENCOUNTER SYMPTOMS
DIARRHEA: 0
COUGH: 0
ABDOMINAL PAIN: 0
NAUSEA: 0
RHINORRHEA: 0
BLURRED VISION: 1
VOMITING: 0
SORE THROAT: 0
WHEEZING: 0
SINUS PRESSURE: 0
SHORTNESS OF BREATH: 0

## 2024-11-06 NOTE — PROGRESS NOTES
Rhythm: Normal rate and regular rhythm.      Heart sounds: Normal heart sounds.   Pulmonary:      Effort: Pulmonary effort is normal.      Breath sounds: Normal breath sounds.   Abdominal:      General: Bowel sounds are normal.      Palpations: Abdomen is soft.   Musculoskeletal:         General: Normal range of motion.      Cervical back: Normal range of motion and neck supple.   Skin:     General: Skin is warm and dry.   Neurological:      General: No focal deficit present.      Mental Status: She is alert and oriented to person, place, and time.   Psychiatric:         Mood and Affect: Mood normal.           Reviewed with the patient: current clinical status, medications, activities and diet.      Side effects, adverse effects of the medication prescribed today, as well as treatment plan/ rationale and result expectations have been discussed with the patient who expresses understanding and desires to proceed.     Close follow up to evaluate treatment results and for coordination of care.  I have reviewed the patient's medical history in detail and updated the computerized patient record.

## 2024-11-06 NOTE — PATIENT INSTRUCTIONS
Endocrinology-    Check your blood sugars 4 times a day, before meals and at night  Document these numbers in a blood glucose log and bring them with you to your follow-up appointment.  If you are prescribed insulin, Do not take your mealtime insulin if your blood sugars less than 120   Call our office if you have blood sugars less than 80 or greater then 300 on two or more occasions  Call our office if you have any questions regarding your blood sugars or insulin dosing regiment  Signs of low blood sugar may include tremors, feeling shaky, sweating, dizziness, confusion and weakness. Check your blood sugar immediatly if you have any of these symptoms.     The plan as discussed at your appointment-   Increase Novolog inject 45 units 3x daily 15-20 minutes before meals (STOP when you start U-500)   Increase Lantus 60 units injected twice daily after breakfast and before bed (STOP when you start U-500)   Start Humulin U- 500 Inject 90 units before breakfast and dinner   Message me 2 weeks after you start this new insulin with some numbers   Hold Farxiga 10 mg by mouth daily  Diflucan 150 mg daily   Dexcom G7 Being used- [employer]   Repeat labs 1 week before your follow-up appointment  Follow-up with me in 4 weeks

## 2024-11-14 DIAGNOSIS — M47.817 LUMBOSACRAL SPONDYLOSIS WITHOUT MYELOPATHY: ICD-10-CM

## 2024-11-14 RX ORDER — TRAMADOL HYDROCHLORIDE 50 MG/1
50 TABLET ORAL 2 TIMES DAILY
Qty: 30 TABLET | Refills: 0 | Status: SHIPPED | OUTPATIENT
Start: 2024-11-14 | End: 2024-12-14

## 2024-11-14 NOTE — TELEPHONE ENCOUNTER
Requested Prescriptions     Pending Prescriptions Disp Refills    traMADol (ULTRAM) 50 MG tablet 30 tablet 0     Sig: Take 1 tablet by mouth 2 times daily for 30 days. Max Daily Amount: 100 mg       Patient last seen on:  07/01/2024    Date of last surgery:  n/a    Date of last refill:  07/01/2024    Reason for request:  back pain is back and would like it to be where she takes it 2x a day instead of once a day with how bad her pain is.     Request date for pharmacy pick-up:  11/14/2024    Next office visit date: 12/12/2024    Atorvastatin, Erythromycin, Hydroxychloroquine, Penicillins, Amlodipine besylate, and Metformin and related

## 2024-11-15 NOTE — PROGRESS NOTES
Chief Complaint   Patient presents with    Left Knee - New Patient Visit, Pain     XRAY TODAY    Right Knee - New Patient Visit, Pain     XRAY TODAY     History of Present Illness:  Catherine Patel is a 69 y.o. female presenting to clinic with complaints of bilateral knee pain. She had surgery in 2011 on her left knee for a meniscus tear. She has aching pain and swelling in her knee since. Her right knee started to bother her more in March. She's had two cortisone injections in her left knee in the last year with Dr. Reeder. She has a history of diabetes that is not well controlled her blood sugars are up into the 400 range in the afternoon. She is currently taking Plavix.     Imaging:  X-rays: Shows bilateral knee arthritis, grade 4 in left and grade 3 in right.      Assessment:   Bilateral knee arthritis    Plan:  We reviewed the role of imaging, physical therapy, injections and the time frame to healing and correlation with outcome.  NSAID: Celebrex with caution. Discussed medical risks given her Plavix. Encouraged further discussion with her cardiologist.   Ice: 60 minutes on and off  Bilateral knee brace  Exercise home program: Medically directed Shoulder therapy / Handout given.  I highly recommended that she call today and get an appointment with her endocrinologist asap. We are going to hold off on an injection until her sugars are better controlled, at least under 300. She can see me back in 2 weeks. We will pre-cert her for gel injections in the meantime given her diabetes.        Physical Exam:  Well-nourished, well-developed. No acute distress. Alert and oriented x3. Responds appropriately to questioning. Good mood. Normal affect.  Bilateral Knee:  ROM: Right knee flexion to 110. Left knee to 100. Trace effusion  Positive Osvaldo´s test/PositiveApley Grind  Neurovascular exam normal distally  Palpable pedal pulse and good cap refill      Review of Systems:  GENERAL: Negative for malaise, significant  weight loss, fever  MUSCULOSKELETAL: See HPI  NEURO:  Negative     Past Medical History:   Diagnosis Date    Body mass index (BMI)40.0-44.9, adult 07/29/2022    BMI 40.0-44.9, adult    Carpal tunnel syndrome, left upper limb 09/16/2021    Carpal tunnel syndrome of left wrist    Dizziness and giddiness 09/16/2021    Postural dizziness    History of falling 09/08/2022    Status post fall    Localized edema 07/29/2022    Edema leg    Morbid (severe) obesity due to excess calories (Multi) 09/29/2022    Morbid obesity with BMI of 40.0-44.9, adult    Pain in right hip 01/20/2022    Right hip pain    Personal history of other endocrine, nutritional and metabolic disease 01/20/2022    History of morbid obesity    Personal history of other specified conditions 10/23/2021    History of vertigo    Personal history of other specified conditions 07/13/2022    History of urinary frequency    Personal history of other specified conditions 08/30/2022    History of edema    Personal history of other specified conditions 08/30/2022    History of shortness of breath    Pneumonia, unspecified organism     Pneumonia of right lung due to infectious organism, unspecified part of lung    Shortness of breath 07/29/2022    Shortness of breath on exertion    Syncope and collapse 09/16/2021    Near syncope       Medication Documentation Review Audit       Reviewed by Henrry Gallegos MD (Physician) on 10/24/24 at 0824      Medication Order Taking? Sig Documenting Provider Last Dose Status   albuterol 90 mcg/actuation inhaler [id number] Yes INHALE 1 TO 2 PUFFS BY MOUTH EVERY 4 TO 6 HOURS AS NEEDED Henrry Gallegos MD  Active   alcohol swabs (Alcohol Pads) pads, medicated [id number] Yes Apply 1 Swab topically once daily. Henrry Gallegos MD  Active   allopurinol (Zyloprim) 100 mg tablet [id number] Yes TAKE 1 TABLET BY MOUTH EVERY DAY Henrry Gallegos MD  Active   Blood glucose monitoring meter kit (Blood Glucose Monitoring) kit [id number] Yes 1  each once daily. Use to test blood sugar once daily and as needed Henrry Gallegos MD  Active   blood sugar diagnostic (Blood Glucose Test) strip [id number] Yes 1 strip once daily. Henrry Gallegos MD  Active   calcium carbonate-vitamin D3 (Calcium 600 + D,3,) 600 mg-10 mcg (400 unit) tablet [id number] Yes Take 1 tablet by mouth once daily. Historical Provider, MD  Active   clopidogrel (Plavix) 75 mg tablet [id number] Yes TAKE 1 TABLET DAILY Henrry Gallegos MD  Active   clotrimazole-betamethasone (Lotrisone) cream [id number] Yes Twice daily to area of irritation x 2 weeks, then 2-3 x per week   Patient taking differently: Apply 1 Application topically 2 times a day. Twice daily to area of irritation x 2 weeks, then 2-3 x per week    ISIDORO Narayanan-CNP  Active   cyanocobalamin (Vitamin B-12) 1,000 mcg tablet [id number] Yes Take 1 tablet (1,000 mcg) by mouth once daily. TAKE PER DIRECTED Historical Provider, MD  Active   dicyclomine (Bentyl) 20 mg tablet [id number] Yes Take 1 tablet (20 mg) by mouth 3 times a day. TAKE PER DIRECTED Historical Provider, MD  Active   ergocalciferol (Vitamin D-2) 1.25 MG (76014 UT) capsule [id number] Yes Take 1 capsule (50,000 Units) by mouth every 14 (fourteen) days. Robel Velazco MD  Active   estradiol (Estrace) 0.01 % (0.1 mg/gram) vaginal cream [id number] Yes Apply nightly 1 gram for 2 weeks, then 3 times per week. PRAFUL Narayanan  Active   Farxiga 10 mg [id number] Yes TAKE 1 TABLET BY MOUTH EVERY DAY IN THE MORNING Robel Velazco MD  Active   fexofenadine (Allegra) 180 mg tablet [id number] Yes Take 1 tablet (180 mg) by mouth once daily. Henrry Gallegos MD  Active   fluticasone (Flonase) 50 mcg/actuation nasal spray [id number] Yes USE 2 SPRAYS INTO EACH NOSTRIL ONCE DAILY Henrry Gallegos MD  Active   furosemide (Lasix) 20 mg tablet [id number] Yes TAKE 1 TABLET BY MOUTH TWICE A DAY Flavio Cox MD  Active     Discontinued 10/24/24 0812   gabapentin (Neurontin) 300 mg  capsule [id number]  Take 2 capsules (600 mg) by mouth 3 times a day. Henrry Gallegos MD  Active   icosapent ethyL (Vascepa) 1 gram capsule [id number] Yes Take 1 capsule (1 g) by mouth 2 times daily (morning and late afternoon). Flavio Cox MD  Active   insulin aspart (NovoLOG Flexpen U-100 Insulin) 100 unit/mL (3 mL) pen [id number] Yes Inject 34 Units under the skin 3 times a day before meals. Take as directed per insulin instructions. Historical Provider, MD  Active   insulin glargine (Lantus Solostar U-100 Insulin) 100 unit/mL (3 mL) pen [id number] Yes Inject 60 Units under the skin 2 times a day. Take as directed per insulin instructions. Historical Provider, MD  Active   ipratropium-albuteroL (Duo-Neb) 0.5-2.5 mg/3 mL nebulizer solution [id number] Yes Inhale. TAKE PER DIRECTED Historical Provider, MD  Active   lancets misc [id number] Yes Use to test blood sugar once daily Henrry Gallegos MD  Active   lisinopril 20 mg tablet [id number] Yes Take 1 tablet (20 mg) by mouth once daily. Henrry Gallegos MD  Active   loperamide (Imodium A-D) 2 mg capsule [id number] Yes Take 1 capsule (2 mg) by mouth once daily as needed. TAKE PER DIRECTED Historical Provider, MD  Active     Discontinued 10/18/24 1422   meclizine (Antivert) 25 mg tablet [id number] Yes TAKE 1 TABLET BY MOUTH THREE TIMES A DAY AS NEEDED Henrry Gallegos MD  Active   metoprolol succinate XL (Toprol-XL) 50 mg 24 hr tablet [id number] Yes TAKE 1 TABLET IN IN THE MORNING AND 1/2 TABLET IN IN THE EVENING FOR TOTAL OF 75MG DAILY   Patient taking differently: Take 1.5 tablets (75 mg) by mouth once daily. TAKE 1 TABLET IN IN THE MORNING AND 1/2 TABLET IN IN THE EVENING  FOR TOTAL OF 75MG DAILY    Henrry Gallegos MD  Active   nitroglycerin (Nitrostat) 0.4 mg SL tablet [id number] Yes Place 1 tablet (0.4 mg) under the tongue every 5 minutes if needed for chest pain. Historical Provider, MD  Active   oxybutynin XL (Ditropan XL) 5 mg 24 hr tablet [id number] Yes  Take 1 tablet (5 mg) by mouth once daily. Do not crush, chew, or split. Olimpia Rosas, APRN-CNP  Active   pantoprazole (ProtoNix) 40 mg EC tablet [id number] Yes Take 1 tablet (40 mg) by mouth 2 times a day. Historical Provider, MD  Active   potassium chloride CR 10 mEq ER tablet [id number] Yes TAKE 1 TABLET DAILY Henrry Gallegos MD  Active   propranolol LA (Inderal LA) 60 mg 24 hr capsule [id number] Yes Take 1 capsule (60 mg) by mouth once daily. Historical Provider, MD  Active   sertraline (Zoloft) 100 mg tablet [id number] Yes TAKE 1 AND 1/2 TABLETS DAILY BY MOUTH Henrry Gallegos MD  Active   Sharps Container [id number] Yes 1 each once daily. Henrry Gallegos MD  Active     Discontinued 10/21/24 1313   simvastatin (Zocor) 20 mg tablet [id number] Yes TAKE 1 TABLET BY MOUTH EVERY DAY Henrry Gallegos MD  Active   Spiriva Respimat 1.25 mcg/actuation inhaler [id number] Yes Inhale 2 puffs once daily. Historical Provider, MD  Active   Wixela Inhub 250-50 mcg/dose diskus inhaler [id number] Yes TAKE 1 PUFF BY MOUTH TWICE A DAY Henrry Gallegos MD  Active                    Allergies   Allergen Reactions    Erythromycin Itching and Rash    Hydroxychloroquine Itching and Rash    Penicillins Itching and Rash    Adhesive Tape-Silicones Other     Skin red,raw with prolonged use    Amlodipine Swelling     Lower legs and feet    Atorvastatin Swelling     Lower legs and feet       Social History     Socioeconomic History    Marital status:      Spouse name: Not on file    Number of children: Not on file    Years of education: Not on file    Highest education level: Not on file   Occupational History    Not on file   Tobacco Use    Smoking status: Former     Types: Cigarettes    Smokeless tobacco: Never   Substance and Sexual Activity    Alcohol use: Not Currently    Drug use: Never    Sexual activity: Not on file   Other Topics Concern    Not on file   Social History Narrative    Not on file     Social Drivers of Health      Financial Resource Strain: Not on file   Food Insecurity: Not on file   Transportation Needs: Not on file   Physical Activity: Not on file   Stress: Not on file   Social Connections: Not on file   Intimate Partner Violence: Not on file   Housing Stability: Not on file       Past Surgical History:   Procedure Laterality Date    MR HEAD ANGIO WO IV CONTRAST  3/7/2020    MR HEAD ANGIO WO IV CONTRAST 3/7/2020 [facility]    MR NECK ANGIO WO IV CONTRAST  3/7/2020    MR NECK ANGIO WO IV CONTRAST 3/7/2020 [facility]    OTHER SURGICAL HISTORY  2022    Eye surgery    OTHER SURGICAL HISTORY  2022    Appendectomy    OTHER SURGICAL HISTORY  2022    Cyst excision    OTHER SURGICAL HISTORY  2022    Cardiac catheterization with stent placement    OTHER SURGICAL HISTORY  2022    Tonsillectomy with adenoidectomy    OTHER SURGICAL HISTORY  2022    Esophagogastroduodenoscopy    OTHER SURGICAL HISTORY  2022    Ankle surgery    OTHER SURGICAL HISTORY  2022     section    OTHER SURGICAL HISTORY  11/10/2022    Complete colonoscopy       No results found.                           Scribe Attestation  By signing my name below, IJune Scribe   attest that this documentation has been prepared under the direction and in the presence of Patrick Pablo MD.

## 2024-11-19 ENCOUNTER — HOSPITAL ENCOUNTER (OUTPATIENT)
Dept: RADIOLOGY | Facility: CLINIC | Age: 69
Discharge: HOME | End: 2024-11-19
Payer: MEDICARE

## 2024-11-19 ENCOUNTER — OFFICE VISIT (OUTPATIENT)
Dept: ORTHOPEDIC SURGERY | Facility: CLINIC | Age: 69
End: 2024-11-19
Payer: MEDICARE

## 2024-11-19 DIAGNOSIS — M17.10 ARTHRITIS OF KNEE: ICD-10-CM

## 2024-11-19 DIAGNOSIS — M25.561 PAIN IN BOTH KNEES, UNSPECIFIED CHRONICITY: ICD-10-CM

## 2024-11-19 DIAGNOSIS — M25.562 PAIN IN BOTH KNEES, UNSPECIFIED CHRONICITY: ICD-10-CM

## 2024-11-19 PROCEDURE — 99204 OFFICE O/P NEW MOD 45 MIN: CPT | Performed by: ORTHOPAEDIC SURGERY

## 2024-11-19 PROCEDURE — 3061F NEG MICROALBUMINURIA REV: CPT | Performed by: ORTHOPAEDIC SURGERY

## 2024-11-19 PROCEDURE — 73564 X-RAY EXAM KNEE 4 OR MORE: CPT | Mod: 50

## 2024-11-19 PROCEDURE — 99214 OFFICE O/P EST MOD 30 MIN: CPT | Performed by: ORTHOPAEDIC SURGERY

## 2024-11-19 PROCEDURE — 1123F ACP DISCUSS/DSCN MKR DOCD: CPT | Performed by: ORTHOPAEDIC SURGERY

## 2024-11-19 PROCEDURE — 4010F ACE/ARB THERAPY RXD/TAKEN: CPT | Performed by: ORTHOPAEDIC SURGERY

## 2024-11-19 PROCEDURE — 1159F MED LIST DOCD IN RCRD: CPT | Performed by: ORTHOPAEDIC SURGERY

## 2024-11-19 PROCEDURE — 3046F HEMOGLOBIN A1C LEVEL >9.0%: CPT | Performed by: ORTHOPAEDIC SURGERY

## 2024-11-19 PROCEDURE — 73564 X-RAY EXAM KNEE 4 OR MORE: CPT | Mod: BILATERAL PROCEDURE | Performed by: ORTHOPAEDIC SURGERY

## 2024-11-19 PROCEDURE — L1812 KO ELASTIC W/JOINTS PRE OTS: HCPCS | Performed by: ORTHOPAEDIC SURGERY

## 2024-11-19 RX ORDER — CELECOXIB 200 MG/1
200 CAPSULE ORAL DAILY
Qty: 30 CAPSULE | Refills: 0 | Status: SHIPPED | OUTPATIENT
Start: 2024-11-19 | End: 2024-12-19

## 2024-11-22 ENCOUNTER — APPOINTMENT (OUTPATIENT)
Dept: CARDIOLOGY | Facility: CLINIC | Age: 69
End: 2024-11-22
Payer: MEDICARE

## 2024-11-25 ENCOUNTER — TELEPHONE (OUTPATIENT)
Dept: CARDIOLOGY | Facility: CLINIC | Age: 69
End: 2024-11-25
Payer: MEDICARE

## 2024-11-26 NOTE — TELEPHONE ENCOUNTER
Per Dr. Flavio Cox patient is an acceptable risk at this time for dental procedure.  Faxed to [facility] [phone] per their request.

## 2024-11-30 DIAGNOSIS — E11.65 TYPE 2 DIABETES MELLITUS WITH HYPERGLYCEMIA, WITH LONG-TERM CURRENT USE OF INSULIN (HCC): ICD-10-CM

## 2024-11-30 DIAGNOSIS — Z79.4 TYPE 2 DIABETES MELLITUS WITH HYPERGLYCEMIA, WITH LONG-TERM CURRENT USE OF INSULIN (HCC): ICD-10-CM

## 2024-11-30 DIAGNOSIS — E04.2 MULTINODULAR THYROID: ICD-10-CM

## 2024-11-30 LAB
ALBUMIN SERPL-MCNC: 4 G/DL (ref 3.5–4.6)
ALP SERPL-CCNC: 100 U/L (ref 40–130)
ALT SERPL-CCNC: 11 U/L (ref 0–33)
ANION GAP SERPL CALCULATED.3IONS-SCNC: 9 MEQ/L (ref 9–15)
AST SERPL-CCNC: 12 U/L (ref 0–35)
BILIRUB SERPL-MCNC: 0.3 MG/DL (ref 0.2–0.7)
BUN SERPL-MCNC: 23 MG/DL (ref 8–23)
CALCIUM SERPL-MCNC: 9.9 MG/DL (ref 8.5–9.9)
CHLORIDE SERPL-SCNC: 93 MEQ/L (ref 95–107)
CO2 SERPL-SCNC: 28 MEQ/L (ref 20–31)
CREAT SERPL-MCNC: 1.39 MG/DL (ref 0.5–0.9)
ERYTHROCYTE [DISTWIDTH] IN BLOOD BY AUTOMATED COUNT: 15.7 % (ref 11.5–14.5)
GLOBULIN SER CALC-MCNC: 2.8 G/DL (ref 2.3–3.5)
GLUCOSE SERPL-MCNC: 420 MG/DL (ref 70–99)
HCT VFR BLD AUTO: 33.4 % (ref 37–47)
HGB BLD-MCNC: 11.3 G/DL (ref 12–16)
MCH RBC QN AUTO: 28.7 PG (ref 27–31.3)
MCHC RBC AUTO-ENTMCNC: 33.8 % (ref 33–37)
MCV RBC AUTO: 84.8 FL (ref 79.4–94.8)
PLATELET # BLD AUTO: 284 K/UL (ref 130–400)
POTASSIUM SERPL-SCNC: 4.8 MEQ/L (ref 3.4–4.9)
PROT SERPL-MCNC: 6.8 G/DL (ref 6.3–8)
RBC # BLD AUTO: 3.94 M/UL (ref 4.2–5.4)
SODIUM SERPL-SCNC: 130 MEQ/L (ref 135–144)
T4 FREE SERPL-MCNC: 1.1 NG/DL (ref 0.84–1.68)
TSH REFLEX: 1.6 UIU/ML (ref 0.44–3.86)
WBC # BLD AUTO: 8.2 K/UL (ref 4.8–10.8)

## 2024-12-01 LAB
SHBG SERPL-SCNC: 20 NMOL/L (ref 17–125)
TESTOST FREE SERPL-MCNC: 1.8 PG/ML (ref 0.6–3.8)
TESTOST SERPL-MCNC: 8 NG/DL (ref 3–41)

## 2024-12-03 ENCOUNTER — APPOINTMENT (OUTPATIENT)
Dept: ORTHOPEDIC SURGERY | Facility: CLINIC | Age: 69
End: 2024-12-03
Payer: MEDICARE

## 2024-12-03 LAB — THYROPEROXIDASE IGG SERPL-ACNC: <4 IU/ML (ref 0–25)

## 2024-12-05 DIAGNOSIS — Z98.61 CORONARY ANGIOPLASTY STATUS: ICD-10-CM

## 2024-12-05 DIAGNOSIS — I25.10 ATHEROSCLEROTIC HEART DISEASE OF NATIVE CORONARY ARTERY WITHOUT ANGINA PECTORIS: ICD-10-CM

## 2024-12-06 RX ORDER — METOPROLOL SUCCINATE 50 MG/1
TABLET, EXTENDED RELEASE ORAL
Qty: 135 TABLET | Refills: 1 | Status: SHIPPED | OUTPATIENT
Start: 2024-12-06

## 2024-12-11 ENCOUNTER — OFFICE VISIT (OUTPATIENT)
Dept: ENDOCRINOLOGY | Age: 69
End: 2024-12-11
Payer: MEDICARE

## 2024-12-11 VITALS
DIASTOLIC BLOOD PRESSURE: 88 MMHG | HEIGHT: 65 IN | HEART RATE: 70 BPM | WEIGHT: 198 LBS | OXYGEN SATURATION: 95 % | BODY MASS INDEX: 32.99 KG/M2 | SYSTOLIC BLOOD PRESSURE: 172 MMHG

## 2024-12-11 DIAGNOSIS — E04.2 MULTINODULAR THYROID: ICD-10-CM

## 2024-12-11 DIAGNOSIS — E11.65 TYPE 2 DIABETES MELLITUS WITH HYPERGLYCEMIA, WITH LONG-TERM CURRENT USE OF INSULIN (HCC): Primary | ICD-10-CM

## 2024-12-11 DIAGNOSIS — Z79.4 TYPE 2 DIABETES MELLITUS WITH HYPERGLYCEMIA, WITH LONG-TERM CURRENT USE OF INSULIN (HCC): Primary | ICD-10-CM

## 2024-12-11 LAB
CHP ED QC CHECK: NORMAL
GLUCOSE BLD-MCNC: 386 MG/DL

## 2024-12-11 PROCEDURE — 1123F ACP DISCUSS/DSCN MKR DOCD: CPT | Performed by: PHYSICIAN ASSISTANT

## 2024-12-11 PROCEDURE — 1160F RVW MEDS BY RX/DR IN RCRD: CPT | Performed by: PHYSICIAN ASSISTANT

## 2024-12-11 PROCEDURE — 95251 CONT GLUC MNTR ANALYSIS I&R: CPT | Performed by: PHYSICIAN ASSISTANT

## 2024-12-11 PROCEDURE — 82962 GLUCOSE BLOOD TEST: CPT | Performed by: PHYSICIAN ASSISTANT

## 2024-12-11 PROCEDURE — 1159F MED LIST DOCD IN RCRD: CPT | Performed by: PHYSICIAN ASSISTANT

## 2024-12-11 PROCEDURE — 99214 OFFICE O/P EST MOD 30 MIN: CPT | Performed by: PHYSICIAN ASSISTANT

## 2024-12-11 PROCEDURE — 3046F HEMOGLOBIN A1C LEVEL >9.0%: CPT | Performed by: PHYSICIAN ASSISTANT

## 2024-12-11 RX ORDER — GLUCAGON INJECTION, SOLUTION 1 MG/.2ML
1 INJECTION, SOLUTION SUBCUTANEOUS
Qty: 2 EACH | Refills: 3 | Status: SHIPPED | OUTPATIENT
Start: 2024-12-11

## 2024-12-11 RX ORDER — INSULIN HUMAN 500 [IU]/ML
INJECTION, SOLUTION SUBCUTANEOUS
Qty: 30 ML | Refills: 5 | Status: SHIPPED | OUTPATIENT
Start: 2024-12-11

## 2024-12-11 ASSESSMENT — ENCOUNTER SYMPTOMS
COUGH: 0
SHORTNESS OF BREATH: 0
ABDOMINAL PAIN: 0
RHINORRHEA: 0
WHEEZING: 0
BLURRED VISION: 1
SINUS PRESSURE: 0
DIARRHEA: 0
SORE THROAT: 0
VOMITING: 0
NAUSEA: 0

## 2024-12-11 NOTE — PROGRESS NOTES
Bilateral knee Durolane injections    L Inj/Asp: bilateral knee on 12/13/2024 10:51 AM  Indications: pain  Details: 21 G needle, anterolateral approach  Medications (Right): 60 mg sodium hyaluronate 60 mg/3 mL  Medications (Left): 60 mg sodium hyaluronate 60 mg/3 mL  Outcome: tolerated well, no immediate complications  Procedure, treatment alternatives, risks and benefits explained, specific risks discussed. Consent was given by the patient. Immediately prior to procedure a time out was called to verify the correct patient, procedure, equipment, support staff and site/side marked as required. Patient was prepped and draped in the usual sterile fashion.        Discussed activity as tolerated, Tylenol/NSAIDs as needed for soreness. Follow up in 4 weeks      Scribe Attestation  By signing my name below, June FOLEY Scribe   attest that this documentation has been prepared under the direction and in the presence of Patrick Pablo MD.

## 2024-12-11 NOTE — PATIENT INSTRUCTIONS
Endocrinology-    Check your blood sugars 4 times a day, before meals and at night  Document these numbers in a blood glucose log and bring them with you to your follow-up appointment.  Call our office if you have blood sugars less than 80 or greater then 400 on two or more occasions  Call our office if you have any questions regarding your blood sugars or insulin dosing regiment  Signs of low blood sugar may include tremors, feeling shaky, sweating, dizziness, confusion and weakness. Check your blood sugar immediatly if you have any of these symptoms.     The plan as discussed at your appointment-    Increase Humulin U- 500 Inject 130 units before breakfast and dinner   Message me 2 weeks after you start this new insulin with some numbers   Hold Farxiga 10 mg by mouth daily  Diflucan 150 mg daily   Dexcom G7 Being used- [employer]   Repeat labs 1 week before your follow-up appointment  Follow-up with me in 4 weeks

## 2024-12-11 NOTE — PROGRESS NOTES
planning, she reported none. She has not had a previous visit with a dietitian. She never participates in exercise. She monitors blood glucose at home 5+ x per day. Blood glucose monitoring compliance is excellent. Her home blood glucose trend is increasing steadily. Her overall blood glucose range is >200 mg/dl. She sees a podiatrist.Eye exam is current.   Thyroid Problem  Patient reports no cold intolerance, diarrhea, fatigue, heat intolerance or palpitations.     No past medical history on file.  No past surgical history on file.  Social History     Socioeconomic History    Marital status:      Spouse name: Not on file    Number of children: Not on file    Years of education: Not on file    Highest education level: Not on file   Occupational History    Not on file   Tobacco Use    Smoking status: Never    Smokeless tobacco: Never   Substance and Sexual Activity    Alcohol use: Never    Drug use: Never    Sexual activity: Not on file   Other Topics Concern    Not on file   Social History Narrative    Not on file     Social Determinants of Health     Financial Resource Strain: Not on file   Food Insecurity: Not on file   Transportation Needs: Not on file   Physical Activity: Not on file   Stress: Not on file   Social Connections: Not on file   Intimate Partner Violence: Not on file   Housing Stability: Not on file     Family History   Problem Relation Age of Onset    Diabetes Father     Diabetes Maternal Grandmother      Allergies   Allergen Reactions    Atorvastatin Swelling     Other reaction(s): edema feet/legs, Swelling/Edema  Other reaction(s): edema feet/legs      Erythromycin Hives, Itching and Rash     Other reaction(s): itchy rash    Hydroxychloroquine Itching and Rash     Other reaction(s): Rash    Penicillins Hives, Itching and Rash     Other reaction(s): itchy rash    Amlodipine Besylate Swelling    Metformin And Related Diarrhea       Current Outpatient Medications:     insulin regular human

## 2024-12-12 ENCOUNTER — OFFICE VISIT (OUTPATIENT)
Age: 69
End: 2024-12-12
Payer: MEDICARE

## 2024-12-12 VITALS — RESPIRATION RATE: 16 BRPM | HEIGHT: 65 IN | TEMPERATURE: 98.2 F | WEIGHT: 198 LBS | BODY MASS INDEX: 32.99 KG/M2

## 2024-12-12 DIAGNOSIS — G89.4 CHRONIC PAIN SYNDROME: ICD-10-CM

## 2024-12-12 DIAGNOSIS — M47.812 CERVICAL SPONDYLOSIS: Primary | ICD-10-CM

## 2024-12-12 DIAGNOSIS — M47.817 LUMBOSACRAL SPONDYLOSIS WITHOUT MYELOPATHY: ICD-10-CM

## 2024-12-12 PROCEDURE — 99214 OFFICE O/P EST MOD 30 MIN: CPT | Performed by: PAIN MEDICINE

## 2024-12-12 PROCEDURE — 99215 OFFICE O/P EST HI 40 MIN: CPT | Performed by: PAIN MEDICINE

## 2024-12-12 PROCEDURE — 1125F AMNT PAIN NOTED PAIN PRSNT: CPT | Performed by: PAIN MEDICINE

## 2024-12-12 PROCEDURE — G2211 COMPLEX E/M VISIT ADD ON: HCPCS | Performed by: PAIN MEDICINE

## 2024-12-12 PROCEDURE — 1123F ACP DISCUSS/DSCN MKR DOCD: CPT | Performed by: PAIN MEDICINE

## 2024-12-12 PROCEDURE — 1159F MED LIST DOCD IN RCRD: CPT | Performed by: PAIN MEDICINE

## 2024-12-12 RX ORDER — TRAMADOL HYDROCHLORIDE 50 MG/1
50 TABLET ORAL 2 TIMES DAILY
Qty: 60 TABLET | Refills: 0 | Status: SHIPPED | OUTPATIENT
Start: 2024-12-12 | End: 2025-01-11

## 2024-12-12 NOTE — PROGRESS NOTES
[facility]  P: [phone]  F: [phone]        Graciela Reddy  (1955)    12/12/2024    Subjective:     Graciela Reddy is 69 y.o. female who complains today of:     Chief Complaint   Patient presents with    Neck Pain    Back Pain   Patient here today for follow-up.  Patient's neck pain is flared up turning twisting bother her achy sharp pain.  Also has chronic right-sided low back pain.  She is on Plavix.  The Ultram helps.  Patient has chronic pain.  Patient is tolerating the pain medications without difficulty.  The pain affects patient's quality life.  Pain usually worse with activity bending twisting better with rest.  Sleep can be impaired.  Pain can be achy sharp, sometimes with spasms.  It varies in intensity.  NRS pain level with and without the medication respectively is     Assessment: Chronic pain syndrome, cervical spondylosis, lumbar spondylosis    Plan: We discussed treatment options in detail today. I am the primary provider for this patient's complex chronic pain condition that requires ongoing medical management.  The nature of this condition and indicated treatment requires a longitudinal relationship and continual monitoring over time for appropriate safety and response.  Shared goals were created and discussed including a reduction in pain intensity and improvement in ability to complete activities of daily living.    We discussed options in detail.  Patient's NRS pain level 8 out of 10.  Her neck pain is flared up quite a bit previous RF ablation done over a year and a half ago helped over 50% for at least a good 15 months.  I like to authorize bilateral C5-6-7 RF ablation under fluoroscopic guidance.  She have to stop Plavix 1 week prior.  Continue Ultram twice a day for chronic pain.  Pain generators reviewed.  Anatomic model of pathology reviewed.  [place] was reviewed.  Narcotic drug policy reviewed in

## 2024-12-13 ENCOUNTER — OFFICE VISIT (OUTPATIENT)
Dept: ORTHOPEDIC SURGERY | Facility: CLINIC | Age: 69
End: 2024-12-13
Payer: MEDICARE

## 2024-12-13 DIAGNOSIS — M17.0 PRIMARY OSTEOARTHRITIS OF BOTH KNEES: ICD-10-CM

## 2024-12-13 DIAGNOSIS — M25.562 PAIN IN BOTH KNEES, UNSPECIFIED CHRONICITY: Primary | ICD-10-CM

## 2024-12-13 DIAGNOSIS — M25.561 PAIN IN BOTH KNEES, UNSPECIFIED CHRONICITY: Primary | ICD-10-CM

## 2024-12-13 PROCEDURE — 20610 DRAIN/INJ JOINT/BURSA W/O US: CPT | Mod: 50 | Performed by: ORTHOPAEDIC SURGERY

## 2024-12-13 PROCEDURE — 2500000004 HC RX 250 GENERAL PHARMACY W/ HCPCS (ALT 636 FOR OP/ED): Mod: JZ | Performed by: ORTHOPAEDIC SURGERY

## 2024-12-13 PROCEDURE — 99211 OFF/OP EST MAY X REQ PHY/QHP: CPT | Performed by: ORTHOPAEDIC SURGERY

## 2024-12-14 DIAGNOSIS — I10 ESSENTIAL (PRIMARY) HYPERTENSION: ICD-10-CM

## 2024-12-15 DIAGNOSIS — J30.9 ALLERGIC RHINITIS, UNSPECIFIED SEASONALITY, UNSPECIFIED TRIGGER: ICD-10-CM

## 2024-12-16 ENCOUNTER — TELEPHONE (OUTPATIENT)
Age: 69
End: 2024-12-16

## 2024-12-16 NOTE — TELEPHONE ENCOUNTER
Call placed to patient appointment scheduled.     BILAT [id number] RFA     AUTH APPROVED FROM 12/16/24-6/14/25

## 2024-12-16 NOTE — TELEPHONE ENCOUNTER
MARIBEL [id number] RFA    AUTH APPROVED FROM 12/16/24-6/14/25  OK to schedule procedure approved as above.   Please note sides/levels approved and date range.   (If applicable, sides/levels approved may differ from those ordered)    TO BE SCHEDULED WITH

## 2024-12-18 RX ORDER — ALLOPURINOL 100 MG/1
100 TABLET ORAL DAILY
Qty: 90 TABLET | Refills: 1 | Status: SHIPPED | OUTPATIENT
Start: 2024-12-18

## 2024-12-18 RX ORDER — FLUTICASONE PROPIONATE 50 MCG
2 SPRAY, SUSPENSION (ML) NASAL DAILY
Qty: 48 ML | Refills: 1 | Status: SHIPPED | OUTPATIENT
Start: 2024-12-18

## 2024-12-19 ENCOUNTER — TELEPHONE (OUTPATIENT)
Dept: ENDOCRINOLOGY | Age: 69
End: 2024-12-19

## 2024-12-19 DIAGNOSIS — K21.9 GASTROESOPHAGEAL REFLUX DISEASE WITHOUT ESOPHAGITIS: Primary | ICD-10-CM

## 2024-12-19 RX ORDER — PANTOPRAZOLE SODIUM 40 MG/1
40 TABLET, DELAYED RELEASE ORAL 2 TIMES DAILY
Qty: 90 TABLET | Refills: 1 | Status: SHIPPED | OUTPATIENT
Start: 2024-12-19

## 2024-12-19 NOTE — TELEPHONE ENCOUNTER
PATIENT CALLED STATING YOU WANTED AN UPDATE ON HER READINGS.  PATIENT SAYS THEY HAVE BEEN COMING DOWN CONSISENTLY, LAST WEEK UPPER 300'S TO 'S THIS WEEK 'S TO 'S.

## 2024-12-20 RX ORDER — GABAPENTIN 300 MG/1
600 CAPSULE ORAL 3 TIMES DAILY
Refills: 0 | OUTPATIENT
Start: 2024-12-20

## 2024-12-27 DIAGNOSIS — E55.9 VITAMIN D DEFICIENCY: ICD-10-CM

## 2024-12-30 ENCOUNTER — APPOINTMENT (OUTPATIENT)
Dept: CARDIOLOGY | Facility: CLINIC | Age: 69
End: 2024-12-30
Payer: MEDICARE

## 2024-12-30 VITALS
HEART RATE: 71 BPM | SYSTOLIC BLOOD PRESSURE: 150 MMHG | BODY MASS INDEX: 33.61 KG/M2 | HEIGHT: 65 IN | DIASTOLIC BLOOD PRESSURE: 90 MMHG

## 2024-12-30 DIAGNOSIS — E78.2 MIXED HYPERLIPIDEMIA: ICD-10-CM

## 2024-12-30 DIAGNOSIS — I25.119 ATHEROSCLEROSIS OF NATIVE CORONARY ARTERY OF NATIVE HEART WITH ANGINA PECTORIS: ICD-10-CM

## 2024-12-30 DIAGNOSIS — I10 ESSENTIAL HYPERTENSION: ICD-10-CM

## 2024-12-30 DIAGNOSIS — R06.02 SHORTNESS OF BREATH: ICD-10-CM

## 2024-12-30 DIAGNOSIS — M47.817 LUMBOSACRAL SPONDYLOSIS WITHOUT MYELOPATHY: ICD-10-CM

## 2024-12-30 DIAGNOSIS — I50.32 CHRONIC DIASTOLIC HEART FAILURE, NYHA CLASS 1: ICD-10-CM

## 2024-12-30 PROCEDURE — 4010F ACE/ARB THERAPY RXD/TAKEN: CPT | Performed by: INTERNAL MEDICINE

## 2024-12-30 PROCEDURE — 3079F DIAST BP 80-89 MM HG: CPT | Performed by: INTERNAL MEDICINE

## 2024-12-30 PROCEDURE — 3077F SYST BP >= 140 MM HG: CPT | Performed by: INTERNAL MEDICINE

## 2024-12-30 PROCEDURE — 1123F ACP DISCUSS/DSCN MKR DOCD: CPT | Performed by: INTERNAL MEDICINE

## 2024-12-30 PROCEDURE — 99214 OFFICE O/P EST MOD 30 MIN: CPT | Performed by: INTERNAL MEDICINE

## 2024-12-30 RX ORDER — ERGOCALCIFEROL 1.25 MG/1
50000 CAPSULE ORAL
Qty: 6 CAPSULE | Refills: 3 | Status: SHIPPED | OUTPATIENT
Start: 2024-12-30

## 2024-12-30 RX ORDER — INSULIN HUMAN 500 [IU]/ML
140 INJECTION, SOLUTION SUBCUTANEOUS 2 TIMES DAILY
COMMUNITY
Start: 2024-12-11

## 2024-12-30 RX ORDER — FUROSEMIDE 20 MG/1
20 TABLET ORAL DAILY
Qty: 90 TABLET | Refills: 3 | Status: SHIPPED | OUTPATIENT
Start: 2024-12-30 | End: 2025-12-30

## 2024-12-30 RX ORDER — ICOSAPENT ETHYL 1 G/1
1 CAPSULE ORAL
Qty: 180 CAPSULE | Refills: 3 | Status: SHIPPED | OUTPATIENT
Start: 2024-12-30 | End: 2025-12-30

## 2024-12-30 NOTE — PROGRESS NOTES
Patient:  Catherine Patel  YOB: 1955  MRN: [id number]       HPI:       Catherine Patel is a 69 y.o. female who returns today for cardiac follow-up.   She was previously followed on a regular basis by Dr. Peacock. She has a history of atherosclerotic heart disease with remote PCI. She has a history of COPD, hypertension, hyperlipidemia, and type 2 diabetes mellitus. She has a history of chronic venous insufficiency. She has chronic kidney disease for which she sees Dr. Velazco.  She has some chronic shortness of breath. She primarily attributes this to underlying asthma and COPD. She has chronic back pain.      CT scan of the chest May 20, 2024 showed scattered pulmonary opacities similar to December 2022.  1.4 cm thyroid nodule was incidentally noted.  Lab studies April 5, 2024 showed a hemoglobin 10.9 and hematocrit 34.2.  Comprehensive metabolic profile shows sodium 132, BUN 42, creatinine 2.01, and glucose 426.  Hemoglobin A1c 14.0.  Cholesterol 221 with HDL 31, LDL 75, and triglycerides 666.     At the time of her last visit she noted gradually worsening exertional shortness of breath.  This had progressed to the point where she was getting winded after walking 10 to 20 feet.  Her symptoms improved rapidly when she stopped to rest.  She also noted a mild increase in peripheral edema.  In light of her symptoms of exertional dyspnea and known coronary artery disease she was scheduled for a Lexiscan myocardial perfusion study this was completed on June 20, 2024 and was negative for ischemia or infarction.  Calculated LV ejection fraction 74%.    She denies any chest pain. She denies any orthopnea, PND, or increasing peripheral edema. She denies any palpitations, lightheadedness, near-syncope, or syncope. She denies any fever, chills, or cough. She denies any nausea, vomiting, or diaphoresis. She denies any hemoptysis, hematemesis, melena, or hematochezia.  Lab studies November 30, 2024 showed  hemoglobin 11.3 and hematocrit 33.4.  Comprehensive metabolic profile showed a glucose 420, sodium 130, potassium 4.8, BUN 23, and creatinine 1.39.  Creatinine proved from her normal baseline.  TSH and free T4 were normal.  Lipid profile April 5, 2024 showed a cholesterol 221 with HDL 31, LDL 75, and triglycerides 666.  Other details as noted below.     The above portion of this note was dictated by me using voice recognition software. I personally performed the services described in the documentation. The scribe entering the documentation below was in my presence. I affirm that the information is both accurate and complete.      Objective:     Vitals:    12/30/24 1238   BP: 154/80   Pulse: 71       Wt Readings from Last 4 Encounters:   10/31/24 91.6 kg (202 lb)   10/23/24 90.3 kg (199 lb)   06/26/24 94.8 kg (209 lb)   05/24/24 97.6 kg (215 lb 3.2 oz)       Allergies:     Allergies   Allergen Reactions    Erythromycin Itching and Rash    Hydroxychloroquine Itching and Rash    Penicillins Itching and Rash    Adhesive Tape-Silicones Other     Skin red,raw with prolonged use    Amlodipine Swelling     Lower legs and feet    Atorvastatin Swelling     Lower legs and feet        Medications:     Current Outpatient Medications   Medication Instructions    albuterol 90 mcg/actuation inhaler INHALE 1 TO 2 PUFFS BY MOUTH EVERY 4 TO 6 HOURS AS NEEDED    alcohol swabs (Alcohol Pads) pads, medicated 1 Swab, topical (top), Daily    allopurinol (ZYLOPRIM) 100 mg, oral, Daily    Blood glucose monitoring meter kit (Blood Glucose Monitoring) kit 1 each, miscellaneous, Daily, Use to test blood sugar once daily and as needed    blood sugar diagnostic (Blood Glucose Test) strip 1 strip, miscellaneous, Daily    calcium carbonate-vitamin D3 (Calcium 600 + D,3,) 600 mg-10 mcg (400 unit) tablet 1 tablet, Daily    clopidogrel (PLAVIX) 75 mg, oral, Daily    clotrimazole-betamethasone (Lotrisone) cream Twice daily to area of irritation x 2  weeks, then 2-3 x per week    cyanocobalamin (Vitamin B-12) 1,000 mcg tablet 1 tablet, Daily    dapagliflozin propanediol (FARXIGA) 10 mg, oral, Daily    dicyclomine (Bentyl) 20 mg tablet 1 tablet, 3 times daily    ergocalciferol (VITAMIN D-2) 50,000 Units, oral, Every 14 days    estradiol (Estrace) 0.01 % (0.1 mg/gram) vaginal cream Apply nightly 1 gram for 2 weeks, then 3 times per week.    Farxiga 10 mg, oral, Daily before breakfast    fexofenadine (ALLEGRA) 180 mg, oral, Daily    fluticasone (Flonase) 50 mcg/actuation nasal spray 2 sprays, Each Nostril, Daily    furosemide (LASIX) 20 mg, oral, 2 times daily    gabapentin (NEURONTIN) 600 mg, oral, 3 times daily    HumuLIN R U-500 (Conc) Kwikpen 140 Units, 2 times daily    icosapent ethyL (VASCEPA) 1 g, oral, 2 times daily (morning and late afternoon)    insulin aspart (NOVOLOG FLEXPEN U-100 INSULIN) 34 Units, 3 times daily before meals    ipratropium-albuteroL (Duo-Neb) 0.5-2.5 mg/3 mL nebulizer solution Inhale. TAKE PER DIRECTED    lancets misc Use to test blood sugar once daily    Lantus Solostar U-100 Insulin 60 Units, 2 times daily    lisinopril 40 mg, oral, Daily    loperamide (Imodium A-D) 2 mg capsule 1 capsule, Daily PRN    meclizine (ANTIVERT) 25 mg, oral, 3 times daily PRN    metoprolol succinate XL (Toprol-XL) 50 mg 24 hr tablet TAKE 1 TABLET IN IN THE MORNING AND 1/2 TABLET IN IN THE EVENING FOR TOTAL OF 75MG DAILY    nitroglycerin (NITROSTAT) 0.4 mg, Every 5 min PRN    pantoprazole (PROTONIX) 40 mg, oral, 2 times daily    potassium chloride CR 10 mEq ER tablet 10 mEq, oral, Daily    propranolol LA (Inderal LA) 60 mg 24 hr capsule 1 capsule, Daily    sertraline (ZOLOFT) 150 mg, oral, Daily    Sharps Container 1 each, miscellaneous, Daily    simvastatin (ZOCOR) 20 mg, oral, Daily    Spiriva Respimat 1.25 mcg/actuation inhaler 2 puffs, Daily    Wixela Inhub 250-50 mcg/dose diskus inhaler 1 puff, inhalation, 2 times daily       Physical Examination:    GENERAL:  Well developed, well nourished, in no acute distress.  CHEST:  Symmetric and nontender.  NEURO/PSYCH:  Alert and oriented times three with approppriate behavior and responses.  NECK:  Supple, no JVD, no bruit.  LUNGS:  Clear to auscultation bilaterally, normal respiratory effort.  HEART:  Rate and rhythm regular with no evident murmur, no gallop appreciated.        There are no rubs, clicks or heaves.  EXTREMITIES:  Warm with good color, no clubbing or cyanosis.  There is no edema noted.  PERIPHERAL VASCULAR:  Pulses present and equally palpable; 2+ throughout.      Lab:     CBC:   Lab Results   Component Value Date    WBC 8.4 04/05/2024    RBC 4.06 04/05/2024    HGB 10.9 (L) 04/05/2024    HCT 34.2 (L) 04/05/2024     04/05/2024        CMP:    Lab Results   Component Value Date     (L) 04/05/2024    K 4.7 04/05/2024    CL 94 (L) 04/05/2024    CO2 28 04/05/2024    BUN 42 (H) 04/05/2024    CREATININE 2.01 (H) 04/05/2024    GLUCOSE 426 (H) 04/05/2024    CALCIUM 10.0 04/05/2024       Lipid Profile:    Lab Results   Component Value Date    TRIG 666 (H) 04/05/2024    HDL 31.2 04/05/2024    LDLCALC  04/05/2024      Comment:      The calculation of LDL and VLDL are inaccurate when the Triglycerides are greater than 400 mg/dL or when the patient is non-fasting. If LDL measurement is necessary contact the testing laboratory for an alternative LDL assay.                                  Near   Borderline      AGE      Desirable  Optimal    High     High     Very High     0-19 Y     0 - 109     ---    110-129   >/= 130     ----    20-24 Y     0 - 119     ---    120-159   >/= 160     ----      >24 Y     0 -  99   100-129  130-159   160-189     >/=190         BMP:  Lab Results   Component Value Date     (L) 04/05/2024     05/31/2022     06/13/2021     (L) 06/12/2021    K 4.7 04/05/2024    K 4.3 05/31/2022    K 3.5 06/13/2021    K 3.5 06/12/2021    CL 94 (L) 04/05/2024      05/31/2022     06/13/2021     06/12/2021    CO2 28 04/05/2024    CO2 27 05/31/2022    CO2 23 06/13/2021    CO2 21 06/12/2021    BUN 42 (H) 04/05/2024    BUN 32 (H) 05/31/2022    BUN 24 (H) 06/13/2021    BUN 28 (H) 06/12/2021    CREATININE 2.01 (H) 04/05/2024    CREATININE 1.88 (H) 05/31/2022    CREATININE 1.07 (H) 06/13/2021    CREATININE 1.22 (H) 06/12/2021       CBC:  Lab Results   Component Value Date    WBC 8.4 04/05/2024    WBC 9.4 05/31/2022    WBC 10.0 06/13/2021    WBC 11.9 (H) 06/12/2021    RBC 4.06 04/05/2024    RBC 3.20 (L) 05/31/2022    RBC 3.18 (L) 06/13/2021    RBC 3.12 (L) 06/12/2021    HGB 10.9 (L) 04/05/2024    HGB 10.7 (L) 05/31/2022    HGB 8.7 (L) 06/13/2021    HGB 8.5 (L) 06/12/2021    HCT 34.2 (L) 04/05/2024    HCT 34.4 (L) 05/31/2022    HCT 28.8 (L) 06/13/2021    HCT 27.8 (L) 06/12/2021    MCV 84 04/05/2024     (H) 05/31/2022    MCV 91 06/13/2021    MCV 89 06/12/2021    MCH 26.8 04/05/2024    MCHC 31.9 (L) 04/05/2024    MCHC 31.1 (L) 05/31/2022    MCHC 30.2 (L) 06/13/2021    MCHC 30.6 (L) 06/12/2021    RDW 16.6 (H) 04/05/2024    RDW 14.0 05/31/2022    RDW 15.5 (H) 06/13/2021    RDW 15.4 (H) 06/12/2021     04/05/2024     05/31/2022     06/13/2021     06/12/2021       Hepatic Function Panel:    Lab Results   Component Value Date    ALKPHOS 91 04/05/2024    ALT 15 04/05/2024    AST 15 04/05/2024    PROT 6.7 04/05/2024    BILITOT 0.4 04/05/2024       HgBA1c:    Lab Results   Component Value Date    HGBA1C 15.9 (H) 09/17/2024       Magnesium:    Lab Results   Component Value Date    MG 1.90 06/13/2021       TSH:    Lab Results   Component Value Date    TSH 3.22 04/20/2021       BNP:   Lab Results   Component Value Date     (H) 06/08/2021        Problem List:     Patient Active Problem List   Diagnosis    Acute sinusitis    Allergic rhinitis    [place]    Anxiety    BPPV (benign paroxysmal positional vertigo)    CAD S/P percutaneous coronary  angioplasty    Chronic back pain    Chronic diastolic heart failure, NYHA class 1    Chronic obstructive pulmonary disease (Multi)    Chronic venous insufficiency    CKD (chronic kidney disease), stage III (Multi)    Depression    Diabetic peripheral neuropathy (Multi)    DM type 2 with diabetic mixed hyperlipidemia (Multi)    Essential hypertension    GERD (gastroesophageal reflux disease)    Hyperuricemia    Hypotension    IBS (irritable bowel syndrome)    Mixed hyperlipidemia    Neck pain    Nephrolithiasis    Peripheral polyneuropathy    Pleural effusion    Renal insufficiency    Sensory disturbance    Thyroid nodule    TIA (transient ischemic attack)    Vertebral artery narrowing    Vertebrobasilar insufficiency    Vitamin D deficiency    Otitis externa of left ear    Shortness of breath    BMI 35.0-35.9,adult    Atherosclerosis of native coronary artery of native heart with angina pectoris    History of PTCA    Class 1 obesity with body mass index (BMI) of 34.0 to 34.9 in adult    Former smoker       Asessment:     Problem List Items Addressed This Visit             ICD-10-CM    Chronic diastolic heart failure, NYHA class 1 I50.32    Relevant Medications    furosemide (Lasix) 20 mg tablet    Other Relevant Orders    Follow Up In Cardiology    Essential hypertension I10    Relevant Medications    furosemide (Lasix) 20 mg tablet    Other Relevant Orders    Follow Up In Cardiology    Comprehensive Metabolic Panel    Lipid Panel    Comprehensive Metabolic Panel    Lipid Panel    Mixed hyperlipidemia E78.2    Relevant Medications    icosapent ethyL (Vascepa) 1 gram capsule    Other Relevant Orders    Follow Up In Cardiology    Lipid Panel    Lipid Panel    Shortness of breath R06.02    Relevant Medications    furosemide (Lasix) 20 mg tablet    Atherosclerosis of native coronary artery of native heart with angina pectoris I25.119    Relevant Orders    Follow Up In Cardiology

## 2024-12-30 NOTE — PATIENT INSTRUCTIONS
6 month follow up appointment   Please have Fasting Labs done soon and then again1 week before your next cardiology appointment (Please print lab slips)    DID YOU KNOW  We have a pharmacy here in the [facility].  They can fill all prescriptions, not just cardiac medications.  Prescriptions from other pharmacies can easily be transferred to the  pharmacy by the  pharmacist on site.   pharmacies offer FREE HOME DELIVERY on medications to anywhere in [place]. They can sync your medications. Typically prescriptions can be ready in 10 - 15 minutes. If pharmacy is unable to fill your  prescription or if cost is more than your paying now the Pharmacist can easily transfer back to your Pharmacy of choice. Pharmacy phone # [phone].     Please bring all medicines, vitamins, and herbal supplements with you in original bottles to every appointment  Prescriptions will not be filled unless you are compliant with your follow up appointments or have a follow up appointment scheduled as per instruction of your physician. Refills should be requested at the time of your visit.

## 2024-12-31 ENCOUNTER — OFFICE VISIT (OUTPATIENT)
Age: 69
End: 2024-12-31
Payer: MEDICARE

## 2024-12-31 VITALS
DIASTOLIC BLOOD PRESSURE: 71 MMHG | OXYGEN SATURATION: 95 % | WEIGHT: 198 LBS | TEMPERATURE: 97.2 F | HEART RATE: 71 BPM | HEIGHT: 65 IN | SYSTOLIC BLOOD PRESSURE: 126 MMHG | BODY MASS INDEX: 32.99 KG/M2

## 2024-12-31 DIAGNOSIS — M47.812 CERVICAL SPONDYLOSIS: Primary | ICD-10-CM

## 2024-12-31 PROCEDURE — 64633 DESTROY CERV/THOR FACET JNT: CPT | Performed by: PAIN MEDICINE

## 2024-12-31 PROCEDURE — 64634 DESTROY C/TH FACET JNT ADDL: CPT | Performed by: PAIN MEDICINE

## 2024-12-31 RX ORDER — DEXAMETHASONE SODIUM PHOSPHATE 10 MG/ML
10 INJECTION, SOLUTION INTRAMUSCULAR; INTRAVENOUS ONCE
Status: COMPLETED | OUTPATIENT
Start: 2024-12-31 | End: 2024-12-31

## 2024-12-31 RX ORDER — TRAMADOL HYDROCHLORIDE 50 MG/1
50 TABLET ORAL 2 TIMES DAILY
Qty: 60 TABLET | Refills: 0 | OUTPATIENT
Start: 2024-12-31 | End: 2025-01-30

## 2024-12-31 RX ORDER — LIDOCAINE HYDROCHLORIDE 10 MG/ML
3 INJECTION, SOLUTION EPIDURAL; INFILTRATION; INTRACAUDAL; PERINEURAL ONCE
Status: COMPLETED | OUTPATIENT
Start: 2024-12-31 | End: 2024-12-31

## 2024-12-31 RX ADMIN — LIDOCAINE HYDROCHLORIDE 3 ML: 10 INJECTION, SOLUTION EPIDURAL; INFILTRATION; INTRACAUDAL; PERINEURAL at 09:44

## 2024-12-31 RX ADMIN — DEXAMETHASONE SODIUM PHOSPHATE 10 MG: 10 INJECTION INTRAMUSCULAR; INTRAVENOUS at 09:43

## 2024-12-31 ASSESSMENT — PAIN DESCRIPTION - LOCATION
LOCATION: NECK
LOCATION: NECK

## 2024-12-31 ASSESSMENT — PAIN SCALES - GENERAL
PAINLEVEL_OUTOF10: 5
PAINLEVEL_OUTOF10: 10

## 2024-12-31 NOTE — PROGRESS NOTES
Procedure: Bilateral C5-6-7 cervical radiofrequency medial branch ablation using fluoroscopic needle guidance.    Timeout taken to identify correct patient, procedure and side.    Patient lying in the prone position the patient was prepped and draped in the usual sterile fashion using Betadine.  The levels were determined under fluoroscopy.  1% preservative-free lidocaine was used to numb the skin using a 27-gauge 1-1/2 inch needle.  Radiofrequency needle was introduced to the anatomic location of the medial branch at the lateral masses utilizing intermittent fluoroscopy.  Motor stimulation up to 2 V was done to confirm no ablation of the ventral ramus at each level.  Prior to lesioning at 80 °C for 90 seconds 1.5 mL of 1% preservative-free lidocaine along with 10 mg dexamethasone preservative-free was divided equally amongst the levels and injected with negative aspiration.  This was done at each level.This procedure was 30% more difficult and required 30% more work secondary to the patient's habitus. The patient has a BMI of 33 and has comorbidities of diabetes and obesity. This required increased work for safe and proper positioning upon the fluoroscopy table, increased needle passes for safe and appropriate needle placement, and increased fluoroscopy time and radiation exposure for proper visualization.        The procedure was tolerated well without complications.  The patient was monitored after procedure, had their usual motor strength.  And discharged home in stable condition.  Patient will ice the area and  take it easy.  All questions answered, chart was reviewed.

## 2025-01-08 ENCOUNTER — APPOINTMENT (OUTPATIENT)
Dept: UROLOGY | Facility: CLINIC | Age: 70
End: 2025-01-08
Payer: MEDICARE

## 2025-01-08 VITALS
HEART RATE: 73 BPM | SYSTOLIC BLOOD PRESSURE: 182 MMHG | DIASTOLIC BLOOD PRESSURE: 80 MMHG | BODY MASS INDEX: 30.49 KG/M2 | WEIGHT: 183 LBS | TEMPERATURE: 97.5 F | HEIGHT: 65 IN

## 2025-01-08 DIAGNOSIS — B37.9 YEAST INFECTION: ICD-10-CM

## 2025-01-08 DIAGNOSIS — R35.0 FREQUENCY OF URINATION: Primary | ICD-10-CM

## 2025-01-08 DIAGNOSIS — L29.2 VULVAR ITCHING: ICD-10-CM

## 2025-01-08 LAB
POC APPEARANCE, URINE: CLEAR
POC BILIRUBIN, URINE: NEGATIVE
POC BLOOD, URINE: NEGATIVE
POC COLOR, URINE: YELLOW
POC GLUCOSE, URINE: ABNORMAL MG/DL
POC KETONES, URINE: NEGATIVE MG/DL
POC LEUKOCYTES, URINE: ABNORMAL
POC NITRITE,URINE: NEGATIVE
POC PH, URINE: 5.5 PH
POC PROTEIN, URINE: ABNORMAL MG/DL
POC SPECIFIC GRAVITY, URINE: 1.02
POC UROBILINOGEN, URINE: 0.2 EU/DL

## 2025-01-08 PROCEDURE — 81003 URINALYSIS AUTO W/O SCOPE: CPT | Performed by: NURSE PRACTITIONER

## 2025-01-08 PROCEDURE — 4010F ACE/ARB THERAPY RXD/TAKEN: CPT | Performed by: NURSE PRACTITIONER

## 2025-01-08 PROCEDURE — 3079F DIAST BP 80-89 MM HG: CPT | Performed by: NURSE PRACTITIONER

## 2025-01-08 PROCEDURE — 1159F MED LIST DOCD IN RCRD: CPT | Performed by: NURSE PRACTITIONER

## 2025-01-08 PROCEDURE — 1123F ACP DISCUSS/DSCN MKR DOCD: CPT | Performed by: NURSE PRACTITIONER

## 2025-01-08 PROCEDURE — 3008F BODY MASS INDEX DOCD: CPT | Performed by: NURSE PRACTITIONER

## 2025-01-08 PROCEDURE — 3077F SYST BP >= 140 MM HG: CPT | Performed by: NURSE PRACTITIONER

## 2025-01-08 PROCEDURE — 99214 OFFICE O/P EST MOD 30 MIN: CPT | Performed by: NURSE PRACTITIONER

## 2025-01-08 PROCEDURE — 1036F TOBACCO NON-USER: CPT | Performed by: NURSE PRACTITIONER

## 2025-01-08 PROCEDURE — 51798 US URINE CAPACITY MEASURE: CPT | Performed by: NURSE PRACTITIONER

## 2025-01-08 RX ORDER — GLUCAGON INJECTION, SOLUTION 1 MG/.2ML
INJECTION, SOLUTION SUBCUTANEOUS
COMMUNITY

## 2025-01-08 RX ORDER — NYSTATIN 100000 [USP'U]/G
1 POWDER TOPICAL 2 TIMES DAILY
Qty: 60 G | Refills: 1 | Status: SHIPPED | OUTPATIENT
Start: 2025-01-08 | End: 2026-01-08

## 2025-01-08 RX ORDER — FLUTICASONE PROPIONATE AND SALMETEROL 500; 50 UG/1; UG/1
1 POWDER RESPIRATORY (INHALATION)
COMMUNITY

## 2025-01-08 RX ORDER — CLOTRIMAZOLE AND BETAMETHASONE DIPROPIONATE 10; .64 MG/G; MG/G
CREAM TOPICAL
Qty: 15 G | Refills: 0 | Status: SHIPPED | OUTPATIENT
Start: 2025-01-08

## 2025-01-08 ASSESSMENT — PATIENT HEALTH QUESTIONNAIRE - PHQ9
1. LITTLE INTEREST OR PLEASURE IN DOING THINGS: NOT AT ALL
2. FEELING DOWN, DEPRESSED OR HOPELESS: NOT AT ALL
SUM OF ALL RESPONSES TO PHQ9 QUESTIONS 1 AND 2: 0

## 2025-01-08 NOTE — PATIENT INSTRUCTIONS
Samples gemtesa given, stop oxybutynin  Has diflucan at home, will take a dose  Lotrisone cream as needed  Nystatin powder    Clinical pharmacy assistance program    Follow up Olimpia 2 mos  Nurse line [phone]

## 2025-01-08 NOTE — PROGRESS NOTES
"01/08/25   [id number]    Follow up [facility], needs refill lotrisone for vulvar itching     Subjective      HPI Catherine Patel is a 69 y.o. female who presents for follow up [facility], last seen 3/26/24; managed w Oxybutynin ER 5mg, psyllium fiber, using estrogen cream and lotrisone as needed for itching; needs refills;     Oxybutynin works 80-90% but now always dry mouth now; no confusion maybe brain fog per daughter here with patient; constipation managed w stool softener; discussed options will trial beta 3 agonist;     Creatinine 1.66, GFR 33.4 on 9/17/24  Hgb A1c 15.9-working on DM per patient  Has diflucan at home as chronic yeast    Neg CST, no prolapse on exam 2024    PVR 0 ml      CT 8/2022 no urolithiasis or obstructive uropathy; no renal masses;      The CT in Aug 2022 was done after MARKO showed possible stone;     Objective     BP (!) 182/80   Pulse 73   Temp 36.4 °C (97.5 °F)   Ht 1.651 m (5' 5\")   Wt 83 kg (183 lb)   BMI 30.45 kg/m²     exam 3/26/24:  Physical Exam  Genitourinary:     Comments: No vulvar lesions, neg Qtip tenderness, mod atrophy  Vulva c/w yeast infection with thick white discharge      General: Appears comfortable and in no apparent distress, well nourished, obese  Head: Normocephalic, atraumatic  Neck: trachea midline  Respiratory: respirations unlabored, no wheezes, and no use of accessory muscles  Cardiovascular: at rest no dyspnea, well perfused  Skin: no visible rashes or lesions  Neurologic: grossly intact, oriented to person, place, and time  Psychiatric: mood and affect appropriate  Musculoskeletal: in chair for appt. no difficulty w upper body movement      Assessment/Plan   Problem List Items Addressed This Visit    None  Visit Diagnoses       Frequency of urination    -  Primary    Relevant Orders    POCT UA Automated manually resulted (Completed)    Post-Void Residual (Completed)          Orders Placed This Encounter   Procedures    Post-Void Residual    POCT UA Automated " manually resulted     Order Specific Question:   Release result to [employer]     Answer:   Immediate [1]      Samples gemtesa given, stop oxybutynin  Has diflucan at home, will take a dose  Lotrisone cream as needed  Nystatin powder    Clinical pharmacy assistance program    Follow up Olimpia 2 mos  Nurse line [phone]     2 mos Olimpia vulva and [facility] med response  Olimpia Rosas, APRN-CNP  Lab Results   Component Value Date    GLUCOSE 426 (H) 04/05/2024    CALCIUM 10.0 04/05/2024     (L) 04/05/2024    K 4.7 04/05/2024    CO2 28 04/05/2024    CL 94 (L) 04/05/2024    BUN 42 (H) 04/05/2024    CREATININE 2.01 (H) 04/05/2024

## 2025-01-08 NOTE — PROGRESS NOTES
Chief Complaint   Patient presents with    Left Knee - Follow-up     Arthritis    Right Knee - Follow-up     Arthritis     History of Present Illness:  01/09/25: We gave her bilateral knee Durolane injections on 12/13/24. She got significant relief with injections. She is able to walk around the house without pain most of the time. She is working with SUM[facility] Shane in endocrinology on her blood sugars.     11/19/24: Catherine Patel is a 69 y.o. female presenting to clinic with complaints of bilateral knee pain. She had surgery in 2011 on her left knee for a meniscus tear. She has aching pain and swelling in her knee since. Her right knee started to bother her more in March. She's had two cortisone injections in her left knee in the last year with Dr. Reeder. She has a history of diabetes that is not well controlled her blood sugars are up into the 400 range in the afternoon. She is currently taking Plavix.     Imaging:  X-rays: Shows bilateral knee arthritis, grade 4 in left and grade 3 in right.      Assessment:   Bilateral knee arthritis    Plan:  We reviewed the role of imaging, physical therapy, injections and the time frame to healing and correlation with outcome.  NSAID: Celebrex with caution. Discussed medical risks given her Plavix. Encouraged further discussion with her cardiologist.   Ice: 60 minutes on and off  Bilateral knee brace  Exercise home program: Medically directed Shoulder therapy / Handout given.  We will pre-cert her for Durolane injections as needed, as they have worked well with her diabetes.  Follow-up with me as needed for injections.     Physical Exam:  Well-nourished, well-developed. No acute distress. Alert and oriented x3. Responds appropriately to questioning. Good mood. Normal affect.  Bilateral Knee:  ROM: Right knee flexion to 110. Left knee to 100. Trace effusion  Positive Osvaldo´s test/PositiveApley Grind  Neurovascular exam normal distally  Palpable pedal pulse and  good cap refill      Review of Systems:  GENERAL: Negative for malaise, significant weight loss, fever  MUSCULOSKELETAL: See HPI  NEURO:  Negative     Past Medical History:   Diagnosis Date    Body mass index (BMI)40.0-44.9, adult 07/29/2022    BMI 40.0-44.9, adult    Carpal tunnel syndrome, left upper limb 09/16/2021    Carpal tunnel syndrome of left wrist    Dizziness and giddiness 09/16/2021    Postural dizziness    History of falling 09/08/2022    Status post fall    Localized edema 07/29/2022    Edema leg    Morbid (severe) obesity due to excess calories (Multi) 09/29/2022    Morbid obesity with BMI of 40.0-44.9, adult    Pain in right hip 01/20/2022    Right hip pain    Personal history of other endocrine, nutritional and metabolic disease 01/20/2022    History of morbid obesity    Personal history of other specified conditions 10/23/2021    History of vertigo    Personal history of other specified conditions 07/13/2022    History of urinary frequency    Personal history of other specified conditions 08/30/2022    History of edema    Personal history of other specified conditions 08/30/2022    History of shortness of breath    Pneumonia, unspecified organism     Pneumonia of right lung due to infectious organism, unspecified part of lung    Shortness of breath 07/29/2022    Shortness of breath on exertion    Syncope and collapse 09/16/2021    Near syncope       Medication Documentation Review Audit       Reviewed by Tamy Whitaker MA (Medical Assistant) on 01/10/25 at 1102      Medication Order Taking? Sig Documenting Provider Last Dose Status   albuterol 90 mcg/actuation inhaler [id number] No INHALE 1 TO 2 PUFFS BY MOUTH EVERY 4 TO 6 HOURS AS NEEDED Henrry Gallegos MD Taking Active   alcohol swabs (Alcohol Pads) pads, medicated [id number] No Apply 1 Swab topically once daily. Henrry Gallegos MD Taking Active   allopurinol (Zyloprim) 100 mg tablet [id number]  TAKE 1 TABLET BY MOUTH EVERY DAY Henrry Gallegos  MD  Active   Blood glucose monitoring meter kit (Blood Glucose Monitoring) kit [id number] No 1 each once daily. Use to test blood sugar once daily and as needed Henrry Gallegos MD Taking Active   blood sugar diagnostic (Blood Glucose Test) strip [id number] No 1 strip once daily. Henrry Gallegos MD Taking Active   calcium carbonate-vitamin D3 (Calcium 600 + D,3,) 600 mg-10 mcg (400 unit) tablet [id number] No Take 1 tablet by mouth once daily. Historical Provider, MD Taking Active   clopidogrel (Plavix) 75 mg tablet [id number]  TAKE 1 TABLET DAILY Henrry Gallegos MD  Active   Discontinued 25 1403   clotrimazole-betamethasone (Lotrisone) cream [id number]  Twice daily to area of irritation x 2 weeks, then 2-3 x per week Olimpia Rosas APRN-CNP  Active   cyanocobalamin (Vitamin B-12) 1,000 mcg tablet [id number] No Take 1 tablet (1,000 mcg) by mouth once daily. TAKE PER DIRECTED Historical Provider, MD Taking Active    Patient not taking:   Discontinued 25 1628   dicyclomine (Bentyl) 20 mg tablet [id number] No Take 1 tablet (20 mg) by mouth 3 times a day. TAKE PER DIRECTED Historical Provider, MD Taking Active   ergocalciferol (Vitamin D-2) 1.25 MG (28649 UT) capsule [id number]  TAKE 1 CAPSULE (50,000 UNITS) BY MOUTH EVERY 14 (FOURTEEN) DAYS. Robel Velazco MD  Active   estradiol (Estrace) 0.01 % (0.1 mg/gram) vaginal cream [id number] No Apply nightly 1 gram for 2 weeks, then 3 times per week. Olimpia Rosas APRN-CNP Taking  24 7139   fexofenadine (Allegra) 180 mg tablet [id number] No Take 1 tablet (180 mg) by mouth once daily. Henrry Gallegos MD Taking Active   fluticasone (Flonase) 50 mcg/actuation nasal spray [id number]  USE 2 SPRAYS INTO EACH NOSTRIL ONCE DAILY Henrry Gallegos MD  Active   fluticasone propion-salmeteroL (Advair Diskus) 500-50 mcg/dose diskus inhaler [id number]  Inhale 1 puff 2 times a day. Rinse mouth with water after use to reduce aftertaste and incidence of candidiasis. Do not  swallow. Cyn Provider, MD  Active   furosemide (Lasix) 20 mg tablet [id number]  Take 1 tablet (20 mg) by mouth once daily. Flavio Cox MD  Active   gabapentin (Neurontin) 300 mg capsule [id number]  Take 2 capsules (600 mg) by mouth 3 times a day. Henrry Gallegos MD  Active   Gvoke HypoPen 2-Pack 1 mg/0.2 mL auto-injector [id number]  Inject 1 mg into the skin every 15 minutes as needed (Glucose less than 60 or if symptomatic) Cyn Provider, MD  Active   HumuLIN R U-500, Conc, Kwikpen 500 unit/mL (3 mL) CONCENTRATED injection [id number]  Inject 140 Units under the skin 2 times a day. Cyn Provider, MD  Active   icosapent ethyL (Vascepa) 1 gram capsule [id number]  Take 1 capsule (1 g) by mouth 2 times daily (morning and late afternoon). Flavio Cox MD  Active   ipratropium-albuteroL (Duo-Neb) 0.5-2.5 mg/3 mL nebulizer solution [id number] No Inhale. TAKE PER DIRECTED Cyn Romero MD Taking Active   lancets misc [id number] No Use to test blood sugar once daily Henrry Galleogs MD Taking Active   lisinopril 40 mg tablet [id number]  Take 1 tablet (40 mg) by mouth once daily. Robel Velazco MD  Active   loperamide (Imodium A-D) 2 mg capsule [id number] No Take 1 capsule (2 mg) by mouth once daily as needed. TAKE PER DIRECTED Cyn Romero MD Taking Active   meclizine (Antivert) 25 mg tablet [id number]  TAKE 1 TABLET BY MOUTH THREE TIMES A DAY AS NEEDED Henrry Gallegos MD  Active   metoprolol succinate XL (Toprol-XL) 50 mg 24 hr tablet [id number]  TAKE 1 TABLET IN IN THE MORNING AND 1/2 TABLET IN IN THE EVENING FOR TOTAL OF 75MG DAILY Henrry Gallegos MD  Active   nitroglycerin (Nitrostat) 0.4 mg SL tablet [id number] No Place 1 tablet (0.4 mg) under the tongue every 5 minutes if needed for chest pain. Cyn Romero MD Taking Active   nystatin (Mycostatin) 100,000 unit/gram powder [id number]  Apply 1 Application topically 2 times a day. Twice daily x 2 weeks, then 2-3x per week  as needed to genitals, between thighs PRAFUL Narayanan  Active   pantoprazole (ProtoNix) 40 mg EC tablet [id number]  Take 1 tablet (40 mg) by mouth 2 times a day. Henrry Gallegos MD  Active   potassium chloride CR 10 mEq ER tablet [id number]  TAKE 1 TABLET DAILY Henrry Gallegos MD  Active   propranolol LA (Inderal LA) 60 mg 24 hr capsule [id number] No Take 1 capsule (60 mg) by mouth once daily.   Patient not taking: Reported on 1/8/2025    Historical Provider, MD Taking Active   sertraline (Zoloft) 100 mg tablet [id number]  TAKE 1 AND 1/2 TABLETS DAILY BY MOUTH Henrry Gallegos MD  Active   Sharps Container [id number] No 1 each once daily. Henrry Gallegos MD Taking Active   simvastatin (Zocor) 20 mg tablet [id number]  TAKE 1 TABLET BY MOUTH EVERY DAY Henrry Gallegos MD  Active   Spiriva Respimat 1.25 mcg/actuation inhaler [id number] No Inhale 2 puffs once daily. Historical Provider, MD Taking Active   vibegron 75 mg tablet [id number]  Take 1 tablet (75 mg) by mouth once daily for 28 doses. PRAFUL Narayanan  Active   Wixela Inhub 250-50 mcg/dose diskus inhaler [id number] No TAKE 1 PUFF BY MOUTH TWICE A DAY   Patient not taking: Reported on 1/8/2025    Henrry Gallegos MD Taking Active                    Allergies   Allergen Reactions    Erythromycin Itching and Rash    Hydroxychloroquine Itching and Rash    Penicillins Itching and Rash    Adhesive Tape-Silicones Other     Skin red,raw with prolonged use    Amlodipine Swelling     Lower legs and feet    Atorvastatin Swelling     Lower legs and feet       Social History     Socioeconomic History    Marital status:      Spouse name: Not on file    Number of children: Not on file    Years of education: Not on file    Highest education level: Not on file   Occupational History    Not on file   Tobacco Use    Smoking status: Former     Types: Cigarettes    Smokeless tobacco: Never   Substance and Sexual Activity    Alcohol use: Not Currently    Drug  use: Never    Sexual activity: Not on file   Other Topics Concern    Not on file   Social History Narrative    Not on file     Social Drivers of Health     Financial Resource Strain: Not on file   Food Insecurity: Not on file   Transportation Needs: Not on file   Physical Activity: Not on file   Stress: Not on file   Social Connections: Not on file   Intimate Partner Violence: Not on file   Housing Stability: Not on file       Past Surgical History:   Procedure Laterality Date    MR HEAD ANGIO WO IV CONTRAST  3/7/2020    MR HEAD ANGIO WO IV CONTRAST 3/7/2020 [facility]    MR NECK ANGIO WO IV CONTRAST  3/7/2020    MR NECK ANGIO WO IV CONTRAST 3/7/2020 [facility]    OTHER SURGICAL HISTORY  2022    Eye surgery    OTHER SURGICAL HISTORY  2022    Appendectomy    OTHER SURGICAL HISTORY  2022    Cyst excision    OTHER SURGICAL HISTORY  2022    Cardiac catheterization with stent placement    OTHER SURGICAL HISTORY  2022    Tonsillectomy with adenoidectomy    OTHER SURGICAL HISTORY  2022    Esophagogastroduodenoscopy    OTHER SURGICAL HISTORY  2022    Ankle surgery    OTHER SURGICAL HISTORY  2022     section    OTHER SURGICAL HISTORY  11/10/2022    Complete colonoscopy       No results found.      Scribe Attestation  By signing my name below, I, Martita Mann   attest that this documentation has been prepared under the direction and in the presence of Patrick Pablo MD.

## 2025-01-09 ENCOUNTER — OFFICE VISIT (OUTPATIENT)
Dept: ENDOCRINOLOGY | Age: 70
End: 2025-01-09
Payer: MEDICARE

## 2025-01-09 VITALS
HEART RATE: 73 BPM | BODY MASS INDEX: 30.66 KG/M2 | DIASTOLIC BLOOD PRESSURE: 97 MMHG | HEIGHT: 65 IN | SYSTOLIC BLOOD PRESSURE: 167 MMHG | WEIGHT: 184 LBS | OXYGEN SATURATION: 97 %

## 2025-01-09 DIAGNOSIS — Z79.4 TYPE 2 DIABETES MELLITUS WITH HYPERGLYCEMIA, WITH LONG-TERM CURRENT USE OF INSULIN (HCC): Primary | ICD-10-CM

## 2025-01-09 DIAGNOSIS — E11.65 TYPE 2 DIABETES MELLITUS WITH HYPERGLYCEMIA, WITH LONG-TERM CURRENT USE OF INSULIN (HCC): Primary | ICD-10-CM

## 2025-01-09 DIAGNOSIS — E04.2 MULTINODULAR THYROID: ICD-10-CM

## 2025-01-09 LAB
CHP ED QC CHECK: NORMAL
GLUCOSE BLD-MCNC: 338 MG/DL
HBA1C MFR BLD: 13.5 %

## 2025-01-09 PROCEDURE — 3046F HEMOGLOBIN A1C LEVEL >9.0%: CPT | Performed by: PHYSICIAN ASSISTANT

## 2025-01-09 PROCEDURE — 1123F ACP DISCUSS/DSCN MKR DOCD: CPT | Performed by: PHYSICIAN ASSISTANT

## 2025-01-09 PROCEDURE — 99213 OFFICE O/P EST LOW 20 MIN: CPT | Performed by: PHYSICIAN ASSISTANT

## 2025-01-09 PROCEDURE — 82962 GLUCOSE BLOOD TEST: CPT | Performed by: PHYSICIAN ASSISTANT

## 2025-01-09 PROCEDURE — 95251 CONT GLUC MNTR ANALYSIS I&R: CPT | Performed by: PHYSICIAN ASSISTANT

## 2025-01-09 PROCEDURE — 83036 HEMOGLOBIN GLYCOSYLATED A1C: CPT | Performed by: PHYSICIAN ASSISTANT

## 2025-01-09 PROCEDURE — 1159F MED LIST DOCD IN RCRD: CPT | Performed by: PHYSICIAN ASSISTANT

## 2025-01-09 RX ORDER — INSULIN HUMAN 500 [IU]/ML
INJECTION, SOLUTION SUBCUTANEOUS
Qty: 30 ML | Refills: 5 | Status: SHIPPED | OUTPATIENT
Start: 2025-01-09

## 2025-01-09 ASSESSMENT — ENCOUNTER SYMPTOMS
BLURRED VISION: 1
WHEEZING: 0
RHINORRHEA: 0
SORE THROAT: 0
NAUSEA: 0
COUGH: 0
ABDOMINAL PAIN: 0
SINUS PRESSURE: 0
DIARRHEA: 0
VOMITING: 0
SHORTNESS OF BREATH: 0

## 2025-01-09 NOTE — PATIENT INSTRUCTIONS
Increase Humulin U- 500 Inject 160 units before breakfast and dinner, Mid day (2-3:00 ) , inject 50 units if glucose is greater than 200  Increase insulin by 5 units every 3-4 days to get glucose levels in the 100s   Hold Farxiga 10 mg by mouth daily  Diflucan 150 mg daily   Dexcom G7 Being used- [facility]   Repeat labs 1 week before your follow-up appointment  Follow-up with me in 4 weeks

## 2025-01-09 NOTE — PROGRESS NOTES
(ULTRAM) 50 MG tablet, Take 1 tablet by mouth 2 times daily for 30 days. Max Daily Amount: 100 mg, Disp: 60 tablet, Rfl: 0    Glucagon (GVOKE HYPOPEN 2-PACK) 1 MG/0.2ML SOAJ, Inject 1 mg into the skin every 15 minutes as needed (Glucose less than 60 or if symptomatic), Disp: 2 each, Rfl: 3    Insulin Pen Needle 32G X 6 MM MISC, 1 Device by Does not apply route 5 times daily, Disp: 150 each, Rfl: 3    blood glucose test strips (TRUE METRIX BLOOD GLUCOSE TEST) strip, 1 each by In Vitro route daily Test Glucose 3 times a day, Disp: 200 each, Rfl: 3    Blood Glucose Monitoring Suppl (TRUE METRIX METER) w/Device KIT, TEST ONCE DAILY AS DIRECTED OR AS NEEDED, Disp: , Rfl:     FARXIGA 10 MG tablet, Take 1 tablet by mouth every morning (Patient not taking: Reported on 1/9/2025), Disp: 30 tablet, Rfl: 5    Continuous Glucose Sensor (DEXCOM G7 SENSOR) MISC, 1 Device by Does not apply route every 10 days, Disp: 12 each, Rfl: 10    Continuous Glucose  (DEXCOM G7 ) JAELYN, 1 Device by Does not apply route daily, Disp: 1 each, Rfl: 0    blood glucose monitor kit and supplies, 1 kit by Other route 4 times daily (before meals and nightly) Pt test 4x daily Dx E11.65.  May substitute for generic or insurance covered product, Disp: 1 kit, Rfl: 0    Lidocaine 4 % OINT, Apply 1 application  topically in the morning and at bedtime, Disp: 150 g, Rfl: 0    allopurinol (ZYLOPRIM) 100 MG tablet, , Disp: , Rfl:     ALPRAZolam (XANAX) 0.5 MG tablet, Take by mouth., Disp: , Rfl:     calcium carb-cholecalciferol 600-10 MG-MCG TABS per tab, , Disp: , Rfl:     Calcium Carbonate-Vitamin D 600-5 MG-MCG TABS, Take by mouth, Disp: , Rfl:     clopidogrel (PLAVIX) 75 MG tablet, Take 1 tablet by mouth daily, Disp: , Rfl:     dicyclomine (BENTYL) 20 MG tablet, Take by mouth, Disp: , Rfl:     fluticasone-salmeterol (ADVAIR) 500-50 MCG/ACT AEPB diskus inhaler, Inhale 1 puff into the lungs 2 times daily, Disp: , Rfl:     gabapentin (NEURONTIN)

## 2025-01-10 ENCOUNTER — OFFICE VISIT (OUTPATIENT)
Dept: ORTHOPEDIC SURGERY | Facility: CLINIC | Age: 70
End: 2025-01-10
Payer: MEDICARE

## 2025-01-10 DIAGNOSIS — M17.0 PRIMARY OSTEOARTHRITIS OF BOTH KNEES: Primary | ICD-10-CM

## 2025-01-10 PROCEDURE — 1123F ACP DISCUSS/DSCN MKR DOCD: CPT | Performed by: ORTHOPAEDIC SURGERY

## 2025-01-10 PROCEDURE — 1159F MED LIST DOCD IN RCRD: CPT | Performed by: ORTHOPAEDIC SURGERY

## 2025-01-10 PROCEDURE — 4010F ACE/ARB THERAPY RXD/TAKEN: CPT | Performed by: ORTHOPAEDIC SURGERY

## 2025-01-10 PROCEDURE — 99213 OFFICE O/P EST LOW 20 MIN: CPT | Performed by: ORTHOPAEDIC SURGERY

## 2025-01-12 DIAGNOSIS — J30.9 ALLERGIC RHINITIS, UNSPECIFIED SEASONALITY, UNSPECIFIED TRIGGER: ICD-10-CM

## 2025-01-12 DIAGNOSIS — M54.9 DORSALGIA, UNSPECIFIED: ICD-10-CM

## 2025-01-12 DIAGNOSIS — G89.29 OTHER CHRONIC PAIN: ICD-10-CM

## 2025-01-16 RX ORDER — GABAPENTIN 300 MG/1
600 CAPSULE ORAL 3 TIMES DAILY
Qty: 540 CAPSULE | Refills: 0 | Status: SHIPPED | OUTPATIENT
Start: 2025-01-16

## 2025-01-16 RX ORDER — MINERAL OIL
180 ENEMA (ML) RECTAL DAILY
Qty: 90 TABLET | Refills: 1 | Status: SHIPPED | OUTPATIENT
Start: 2025-01-16

## 2025-01-24 DIAGNOSIS — M47.817 LUMBOSACRAL SPONDYLOSIS WITHOUT MYELOPATHY: ICD-10-CM

## 2025-01-24 RX ORDER — TRAMADOL HYDROCHLORIDE 50 MG/1
50 TABLET ORAL 2 TIMES DAILY
Qty: 60 TABLET | Refills: 0 | Status: SHIPPED | OUTPATIENT
Start: 2025-01-24 | End: 2025-02-23

## 2025-01-28 DIAGNOSIS — K21.9 GASTROESOPHAGEAL REFLUX DISEASE WITHOUT ESOPHAGITIS: ICD-10-CM

## 2025-01-28 RX ORDER — PANTOPRAZOLE SODIUM 40 MG/1
40 TABLET, DELAYED RELEASE ORAL 2 TIMES DAILY
Qty: 90 TABLET | Refills: 1 | Status: SHIPPED | OUTPATIENT
Start: 2025-01-28 | End: 2025-01-28 | Stop reason: SDUPTHER

## 2025-01-28 RX ORDER — PANTOPRAZOLE SODIUM 40 MG/1
40 TABLET, DELAYED RELEASE ORAL 2 TIMES DAILY
Qty: 180 TABLET | Refills: 1 | Status: SHIPPED | OUTPATIENT
Start: 2025-01-28

## 2025-01-29 RX ORDER — INSULIN HUMAN 500 [IU]/ML
INJECTION, SOLUTION SUBCUTANEOUS
Qty: 30 ML | Refills: 5 | Status: SHIPPED | OUTPATIENT
Start: 2025-01-29

## 2025-01-29 NOTE — TELEPHONE ENCOUNTER
Requested Prescriptions     Pending Prescriptions Disp Refills    insulin regular human (HUMULIN R U-500 KWIKPEN) 500 UNIT/ML SOPN concentrated injection pen 30 mL 5     Sig: Inject 150 units before breakfast and dinner

## 2025-02-05 DIAGNOSIS — I25.10 ATHEROSCLEROTIC HEART DISEASE OF NATIVE CORONARY ARTERY WITHOUT ANGINA PECTORIS: ICD-10-CM

## 2025-02-05 DIAGNOSIS — Z98.61 CORONARY ANGIOPLASTY STATUS: ICD-10-CM

## 2025-02-06 RX ORDER — SIMVASTATIN 20 MG/1
20 TABLET, FILM COATED ORAL DAILY
Qty: 90 TABLET | Refills: 1 | Status: SHIPPED | OUTPATIENT
Start: 2025-02-06

## 2025-02-08 DIAGNOSIS — G45.0 VERTEBRO-BASILAR ARTERY SYNDROME: ICD-10-CM

## 2025-02-12 ENCOUNTER — OFFICE VISIT (OUTPATIENT)
Dept: ENDOCRINOLOGY | Age: 70
End: 2025-02-12
Payer: MEDICARE

## 2025-02-12 VITALS
WEIGHT: 185 LBS | HEIGHT: 65 IN | SYSTOLIC BLOOD PRESSURE: 138 MMHG | BODY MASS INDEX: 30.82 KG/M2 | HEART RATE: 71 BPM | DIASTOLIC BLOOD PRESSURE: 78 MMHG

## 2025-02-12 DIAGNOSIS — E11.65 TYPE 2 DIABETES MELLITUS WITH HYPERGLYCEMIA, WITH LONG-TERM CURRENT USE OF INSULIN (HCC): Primary | ICD-10-CM

## 2025-02-12 DIAGNOSIS — Z79.4 TYPE 2 DIABETES MELLITUS WITH HYPERGLYCEMIA, WITH LONG-TERM CURRENT USE OF INSULIN (HCC): Primary | ICD-10-CM

## 2025-02-12 DIAGNOSIS — E04.2 MULTINODULAR THYROID: ICD-10-CM

## 2025-02-12 LAB
CHP ED QC CHECK: NORMAL
GLUCOSE BLD-MCNC: 376 MG/DL

## 2025-02-12 PROCEDURE — 3046F HEMOGLOBIN A1C LEVEL >9.0%: CPT | Performed by: PHYSICIAN ASSISTANT

## 2025-02-12 PROCEDURE — 1126F AMNT PAIN NOTED NONE PRSNT: CPT | Performed by: PHYSICIAN ASSISTANT

## 2025-02-12 PROCEDURE — 1159F MED LIST DOCD IN RCRD: CPT | Performed by: PHYSICIAN ASSISTANT

## 2025-02-12 PROCEDURE — 1123F ACP DISCUSS/DSCN MKR DOCD: CPT | Performed by: PHYSICIAN ASSISTANT

## 2025-02-12 PROCEDURE — 99214 OFFICE O/P EST MOD 30 MIN: CPT | Performed by: PHYSICIAN ASSISTANT

## 2025-02-12 PROCEDURE — 95251 CONT GLUC MNTR ANALYSIS I&R: CPT | Performed by: PHYSICIAN ASSISTANT

## 2025-02-12 PROCEDURE — 82962 GLUCOSE BLOOD TEST: CPT | Performed by: PHYSICIAN ASSISTANT

## 2025-02-12 RX ORDER — INSULIN HUMAN 500 [IU]/ML
INJECTION, SOLUTION SUBCUTANEOUS
Qty: 60 ML | Refills: 5 | Status: SHIPPED | OUTPATIENT
Start: 2025-02-12

## 2025-02-12 RX ORDER — SERTRALINE HYDROCHLORIDE 100 MG/1
150 TABLET, FILM COATED ORAL DAILY
Qty: 135 TABLET | Refills: 1 | Status: SHIPPED | OUTPATIENT
Start: 2025-02-12

## 2025-02-12 ASSESSMENT — ENCOUNTER SYMPTOMS
NAUSEA: 0
SHORTNESS OF BREATH: 0
SORE THROAT: 0
COUGH: 0
VOMITING: 0
WHEEZING: 0
SINUS PRESSURE: 0
BLURRED VISION: 1
RHINORRHEA: 0
ABDOMINAL PAIN: 0
DIARRHEA: 0

## 2025-02-12 NOTE — PROGRESS NOTES
2/12/2025    Assessment:       Diagnosis Orders   1. Type 2 diabetes mellitus with hyperglycemia, with long-term current use of insulin ([facility])  POCT Glucose    Comprehensive Metabolic Panel    Hemoglobin A1C    GLUCOSE MONITOR, 72 HOUR, PHYS INTERP    Cortisol Total      2. Multinodular thyroid  TSH    T4, Free    Thyroid Peroxidase Antibody    Thyrotropin Receptor Antibody        PLAN:     Increase Humulin U-500 Inject 200 units before breakfast, 100 units before lunch, 200 units before dinner  Increase insulin by 5 units every 3-4 days to get glucose levels in the 100s   Hold Farxiga 10 mg by mouth daily  Start Mounjaro 2.5 mg injected weekly   Diflucan 150 mg daily   Dexcom G7 Being used- [facility]   Repeat Thyroid US 6/2025  Repeat labs 1 week before your follow-up appointment  Follow-up with me in 4 weeks    Orders Placed This Encounter   Procedures    GLUCOSE MONITOR, 72 HOUR, PHYS INTERP    Comprehensive Metabolic Panel     Standing Status:   Future     Standing Expiration Date:   2/12/2026    Hemoglobin A1C     Standing Status:   Future     Standing Expiration Date:   2/12/2026    TSH     Standing Status:   Future     Standing Expiration Date:   2/12/2026    T4, Free     Standing Status:   Future     Standing Expiration Date:   2/12/2026    Thyroid Peroxidase Antibody     Standing Status:   Future     Standing Expiration Date:   2/12/2026    Thyrotropin Receptor Antibody     Standing Status:   Future     Standing Expiration Date:   2/12/2026    Cortisol Total     Standing Status:   Future     Standing Expiration Date:   2/12/2026     Order Specific Question:   8AM or 4PM?     Answer:   8 am    POCT Glucose     Orders Placed This Encounter   Medications    insulin regular human (HUMULIN R U-500 KWIKPEN) 500 UNIT/ML SOPN concentrated injection pen     Sig: Inject 300 units before breakfast, 200 units before lunch and 300 units before dinner     Dispense:  60 mL     Refill:  5     No follow-ups on

## 2025-02-13 DIAGNOSIS — Z98.61 CAD S/P PERCUTANEOUS CORONARY ANGIOPLASTY: ICD-10-CM

## 2025-02-13 DIAGNOSIS — I25.10 CAD S/P PERCUTANEOUS CORONARY ANGIOPLASTY: ICD-10-CM

## 2025-02-18 RX ORDER — CLOPIDOGREL BISULFATE 75 MG/1
75 TABLET ORAL DAILY
Qty: 90 TABLET | Refills: 1 | Status: SHIPPED | OUTPATIENT
Start: 2025-02-18

## 2025-02-27 ENCOUNTER — APPOINTMENT (OUTPATIENT)
Dept: NEPHROLOGY | Facility: CLINIC | Age: 70
End: 2025-02-27
Payer: MEDICARE

## 2025-02-27 DIAGNOSIS — G89.4 CHRONIC PAIN SYNDROME: ICD-10-CM

## 2025-02-27 RX ORDER — TRAMADOL HYDROCHLORIDE 50 MG/1
50 TABLET ORAL 2 TIMES DAILY PRN
Qty: 14 TABLET | Refills: 4 | Status: SHIPPED | OUTPATIENT
Start: 2025-02-27 | End: 2025-03-20

## 2025-02-27 NOTE — TELEPHONE ENCOUNTER
Requested Prescriptions     Pending Prescriptions Disp Refills    traMADol (ULTRAM) 50 MG tablet 14 tablet 4     Sig: Take 1 tablet by mouth 2 times daily as needed for Pain for up to 21 days. Max Daily Amount: 100 mg       Patient last seen on:  2/12/25    Date of last surgery:  n/a    Date of last refill:  1/2/25    Reason for request:  patient requesting a short supply till her appointment on 3/20/25    Request date for pharmacy pick-up:  2/27/25    Next office visit date: Visit date not found    Atorvastatin, Erythromycin, Hydroxychloroquine, Penicillins, Amlodipine besylate, and Metformin and related

## 2025-02-28 ENCOUNTER — APPOINTMENT (OUTPATIENT)
Dept: PHARMACY | Facility: HOSPITAL | Age: 70
End: 2025-02-28
Payer: MEDICARE

## 2025-02-28 ENCOUNTER — TELEPHONE (OUTPATIENT)
Dept: ENDOCRINOLOGY | Age: 70
End: 2025-02-28

## 2025-02-28 ENCOUNTER — TELEPHONE (OUTPATIENT)
Dept: UROLOGY | Facility: CLINIC | Age: 70
End: 2025-02-28

## 2025-02-28 DIAGNOSIS — L29.2 VULVAR ITCHING: ICD-10-CM

## 2025-02-28 DIAGNOSIS — R35.0 FREQUENCY OF URINATION: ICD-10-CM

## 2025-02-28 NOTE — PROGRESS NOTES
"  Patient ID: Catherine Patel is a 69 y.o. female who presents for Urinary frequency.    Referring Provider: Olimpia Rosas  Pt was referred for cost assistance for Myrbetriq    Preferred Pharmacy:    [facility]/pharmacy #[id number] - [place], [place] - [address]  [place] [address]  Phone: [phone] Fax: [phone]    [facility] - [place], [place] - [address]  [address]  [place] [address]  Phone: [phone] Fax: [phone]      Copay assistance:   Do you have copays on your medications? Yes  Do you have trouble affording your current medications? Yes     Patient Assistance Screening (VAF)    Patient verbally reports monthly or yearly income which is less than 400% federal poverty level   Application for program has been submitted for the following medications:   MYRBETRIQ  Patient has been informed that program team will be reaching out to them to discuss necessary documentation, instructed to answer phone/return voicemail.   Patient aware this process may take up to 6 weeks.   If approved medication must be filled through [facility] pharmacy and may be picked up or mailed to patient.       Subjective      Vaginal Symptoms  Vaginal Dryness: none  Dysuria (pain, burning, stinging, or itching with urination): none  Vaginal and/or vulvar irritation/itching: none  Recurrent urinary tract infections: no  No results found for: \"ESTRADIOLFRE\", \"PROGESTERONE\", \"ESTRADIOL\", \"FSH\"    Non Pharm Mgmt:   none  Previous Treatment:   none  Current Treatment:   Estradiol 0.01% cream    Urinary Symptoms  Medications that may contribute to symptoms: furosemide,   diuretics, anticholinergics, alpha blockers (doxazosin, prazosin, terazosin), tricyclic antidepressants, antipsychotics (lithium, clozapine), calcium channel blockers (causes bladder to relax and affects ability to empty properly), opioids (impair bladder contraction), antihistamines (diphenhydramine, chlorpheniramine), " pseudoephedrine, phenylephrine, SGLT2 inhibitors (increases the amount of glucose and fluid excreted through kidneys).   Any history of:   Narrow-angle glaucoma? no   Impaired gastric emptying? Pt has IBS  Urinary retention? none  If yes to any of the above use caution/avoid antimuscarinic medications    If diabetes, blood sugar controlled? No, A1c 15.9  Frequently of bowel movement? Once daily    Tobacco use? No quit in 2006   How many protective undergarments are you utilizing daily? Pads-1-4 daily   Recommend Coloplast Ro Protect moisture barrier cream for incontinence associated skin irritation/breakdown.     What medications have been tried/stopped?   Oxybutynin-helped a little  Current medication? Myrbetriq samples provided by christiano schroeder   When started? 1/8/25  Side effects? None  Improvement in symptoms? No itch, irritation       Cardiovascular Health  The 10-year ASCVD risk score (Lauren FREGOSO, et al., 2019) is: 40.6%    Values used to calculate the score:      Age: 69 years      Sex: Female      Is Non- : No      Diabetic: Yes      Tobacco smoker: No      Systolic Blood Pressure: 182 mmHg      Is BP treated: Yes      HDL Cholesterol: 31.2 mg/dL      Total Cholesterol: 221 mg/dL    Lab Results   Component Value Date    CHOL 221 (H) 04/05/2024     Lab Results   Component Value Date    HDL 31.2 04/05/2024     Lab Results   Component Value Date    LDLCALC  04/05/2024      Comment:      The calculation of LDL and VLDL are inaccurate when the Triglycerides are greater than 400 mg/dL or when the patient is non-fasting. If LDL measurement is necessary contact the testing laboratory for an alternative LDL assay.                                  Near   Borderline      AGE      Desirable  Optimal    High     High     Very High     0-19 Y     0 - 109     ---    110-129   >/= 130     ----    20-24 Y     0 - 119     ---    120-159   >/= 160     ----      >24 Y     0 -  99   100-129  130-159    "160-189     >/=190       Lab Results   Component Value Date    TRIG 666 (H) 04/05/2024     No components found for: \"CHOLHDL\"        Blood Sugar Balance  Lab Results   Component Value Date    GLUCOSE 426 (H) 04/05/2024    HGBA1C 15.9 (H) 09/17/2024    HGBA1C 14.0 (H) 04/05/2024    HGBA1C 14.8 (H) 07/27/2023     No results found for: \"LEPTIN\", \"INSULFAST\", \"GLUF\"      Thyroid  Lab Results   Component Value Date    TSH 3.22 04/20/2021       Iron Status  Lab Results   Component Value Date    IRON 12 (L) 06/09/2021    TIBC 223 (L) 06/09/2021    FERRITIN 200 (H) 06/09/2021        Kidney Function  Lab Results   Component Value Date    GFRF 29 (A) 05/31/2022    CREATININE 2.01 (H) 04/05/2024       Potassium  Lab Results   Component Value Date    K 4.7 04/05/2024        Vitamin D3  Lab Results   Component Value Date    VITD25 25 (L) 04/05/2024       Current Outpatient Medications on File Prior to Visit   Medication Sig Dispense Refill    clopidogrel (Plavix) 75 mg tablet TAKE 1 TABLET DAILY 90 tablet 1    sertraline (Zoloft) 100 mg tablet TAKE 1 AND 1/2 TABLETS BY MOUTH DAILY 135 tablet 1    albuterol 90 mcg/actuation inhaler INHALE 1 TO 2 PUFFS BY MOUTH EVERY 4 TO 6 HOURS AS NEEDED 18 g 3    alcohol swabs (Alcohol Pads) pads, medicated Apply 1 Swab topically once daily. 100 each 3    allopurinol (Zyloprim) 100 mg tablet TAKE 1 TABLET BY MOUTH EVERY DAY 90 tablet 1    Blood glucose monitoring meter kit (Blood Glucose Monitoring) kit 1 each once daily. Use to test blood sugar once daily and as needed 1 kit 1    blood sugar diagnostic (Blood Glucose Test) strip 1 strip once daily. 100 strip 3    calcium carbonate-vitamin D3 (Calcium 600 + D,3,) 600 mg-10 mcg (400 unit) tablet Take 1 tablet by mouth once daily.      clotrimazole-betamethasone (Lotrisone) cream Twice daily to area of irritation x 2 weeks, then 2-3 x per week 15 g 0    cyanocobalamin (Vitamin B-12) 1,000 mcg tablet Take 1 tablet (1,000 mcg) by mouth once daily. " TAKE PER DIRECTED      dicyclomine (Bentyl) 20 mg tablet Take 1 tablet (20 mg) by mouth 3 times a day. TAKE PER DIRECTED      ergocalciferol (Vitamin D-2) 1.25 MG (15652 UT) capsule TAKE 1 CAPSULE (50,000 UNITS) BY MOUTH EVERY 14 (FOURTEEN) DAYS. 6 capsule 3    estradiol (Estrace) 0.01 % (0.1 mg/gram) vaginal cream Apply nightly 1 gram for 2 weeks, then 3 times per week. 42.5 g 5    fexofenadine (Allegra) 180 mg tablet TAKE 1 TABLET (180 MG) BY MOUTH ONCE DAILY. 90 tablet 1    fluticasone (Flonase) 50 mcg/actuation nasal spray USE 2 SPRAYS INTO EACH NOSTRIL ONCE DAILY 48 mL 1    fluticasone propion-salmeteroL (Advair Diskus) 500-50 mcg/dose diskus inhaler Inhale 1 puff 2 times a day. Rinse mouth with water after use to reduce aftertaste and incidence of candidiasis. Do not swallow.      furosemide (Lasix) 20 mg tablet Take 1 tablet (20 mg) by mouth once daily. 90 tablet 3    gabapentin (Neurontin) 300 mg capsule TAKE 2 CAPSULES (600 MG) BY MOUTH 3 TIMES A  capsule 0    Gvoke HypoPen 2-Pack 1 mg/0.2 mL auto-injector Inject 1 mg into the skin every 15 minutes as needed (Glucose less than 60 or if symptomatic)      HumuLIN R U-500, Conc, Kwikpen 500 unit/mL (3 mL) CONCENTRATED injection Inject 140 Units under the skin 2 times a day.      icosapent ethyL (Vascepa) 1 gram capsule Take 1 capsule (1 g) by mouth 2 times daily (morning and late afternoon). 180 capsule 3    ipratropium-albuteroL (Duo-Neb) 0.5-2.5 mg/3 mL nebulizer solution Inhale. TAKE PER DIRECTED      lancets misc Use to test blood sugar once daily 100 each 3    lisinopril 40 mg tablet Take 1 tablet (40 mg) by mouth once daily. 90 tablet 3    loperamide (Imodium A-D) 2 mg capsule Take 1 capsule (2 mg) by mouth once daily as needed. TAKE PER DIRECTED      meclizine (Antivert) 25 mg tablet TAKE 1 TABLET BY MOUTH THREE TIMES A DAY AS NEEDED 270 tablet 1    metoprolol succinate XL (Toprol-XL) 50 mg 24 hr tablet TAKE 1 TABLET IN IN THE MORNING AND 1/2  TABLET IN IN THE EVENING FOR TOTAL OF 75MG DAILY 135 tablet 1    nitroglycerin (Nitrostat) 0.4 mg SL tablet Place 1 tablet (0.4 mg) under the tongue every 5 minutes if needed for chest pain.      nystatin (Mycostatin) 100,000 unit/gram powder Apply 1 Application topically 2 times a day. Twice daily x 2 weeks, then 2-3x per week as needed to genitals, between thighs 60 g 1    pantoprazole (ProtoNix) 40 mg EC tablet Take 1 tablet (40 mg) by mouth 2 times a day. 180 tablet 1    potassium chloride CR 10 mEq ER tablet TAKE 1 TABLET DAILY 90 tablet 1    propranolol LA (Inderal LA) 60 mg 24 hr capsule Take 1 capsule (60 mg) by mouth once daily. (Patient not taking: Reported on 1/8/2025)      Sharps Container 1 each once daily. 1 each 3    simvastatin (Zocor) 20 mg tablet Take 1 tablet (20 mg) by mouth once daily. 90 tablet 1    Spiriva Respimat 1.25 mcg/actuation inhaler Inhale 2 puffs once daily.      Wixela Inhub 250-50 mcg/dose diskus inhaler TAKE 1 PUFF BY MOUTH TWICE A DAY (Patient not taking: Reported on 1/8/2025) 180 each 1     No current facility-administered medications on file prior to visit.        Medication and allergy reconciliation completed     Drug Interactions   No significant drug interactions identified    Assessment/Plan     Patient is experiencing urinary frequency.   Has tried before oxybutynin  Patient plans to apply for [facility], pending 1040 via email to [email or web address]  Patient confirms their income is within the below guidelines  Patient confirms they live in [facility]  Patient confirms they have current insurance that covers medications  Patient confirms they are not part of the Medicare Prescription Payment Program (MPPP)    2025 Poverty Guidelines     Persons in family/household 400% federal Poverty Limit    1 62,600    2 84,600    3 106,600    4 128,600    5 150,600    6 172,600    7 194,600    8 216,600          Myrbetriq  Discussed MOA: activates beta-3 adrenergic  receptors in the bladder resulting in relaxation of the detrusor smooth muscle during the urine storage phase, thus increasing bladder capacity.  Provided education on administration (swallow whole with water; do not chew, divide, or crush) and potential side effects including but not limited to hypertension 8-11%, UTI 3-6%, headache 2-4%, nose or throat irritation 3%, dry mouth 3-4%, and constipation 1-3%.   Any signs or symptoms of angioedema- immediately discontinue use and seek immediate medical attention.   Monitor blood pressure and heart rate. May notice a slight increase. Please call me if blood pressure becomes >120/80 mmHg or pulse significantly elevates from baseline.   BP Readings from Last 3 Encounters:   01/08/25 (!) 182/80   12/30/24 150/90   10/31/24 159/80     Caution with other meds that prolong QT interval- many drug:drug interactions Meclizine  Patient is in agreement that they will notify me if starting any new medications  Efficacy is seen within 8 weeks; steady state achieved within 7 days.      LABS  Lab Results   Component Value Date    GFRF 29 (A) 05/31/2022     Make sure GFR >15 ml/min or dose adjust      START  Myrbetriq 25 mg once daily. May increase to 50 mg once daily after 4 to 8 weeks based on response and tolerability.  Messaged referring noting concerns for an increase in blood pressure especially with patient most recent BP. Writer did recommend possibly switching to Gemtesa.     Follow-up: 4/18/25 1140     Time spent with pt: Total length of time 30 (minutes) of the encounter and more than 50% was spent counseling the patient.      Neyda Mejía, PharmD  PGY1 Pharmacy Resident   [phone]    Continue all meds under the continuation of care with the referring provider and clinical pharmacy team.    Verbal consent to manage patient's drug therapy was obtained from the patient and/or an individual authorized to act on behalf of a patient. They were informed they may decline  to participate or withdraw from participation in pharmacy services at any time.

## 2025-02-28 NOTE — TELEPHONE ENCOUNTER
PT calling to update sugar.  Sugar has been between 300-345    Pt has raised insuling from between 100-150 to between 120-170      Please advise if any concerns

## 2025-02-28 NOTE — TELEPHONE ENCOUNTER
Incoming message from Neyda, PharmD...    Ivan Doan for this patient I just wanted to confirm you wanted to start this patient on Myrbetriq. My concern is for her last BP pressure reading which was elevated and Myrbetriq can increase blood pressure. In her case she may benefit from Gemtesa which she would still qualify for with  Patient assistance program.     My response...    Good morning, Olimpia is out of town at a conference this weekend but I will discuss this with her when she returns on Monday and will let you know. Thank you for reaching out and have a great weekend!     Olimpia, please advise on Monday when you return, thanks.

## 2025-03-03 NOTE — TELEPHONE ENCOUNTER
Okay to increase the insulin to keep the blood sugars between 100-2 50 range based on continuous glucose monitoring

## 2025-03-07 NOTE — TELEPHONE ENCOUNTER
Requested Prescriptions     Pending Prescriptions Disp Refills    traMADol (ULTRAM) 50 MG tablet 60 tablet 0     Sig: Take 1 tablet by mouth 2 times daily for 30 days. Max Daily Amount: 100 mg       Patient last seen on:  12/31/2024      Date of last refill:  12/12/2024    Reason for request:  PATIENT LVM REQUESTING REFILL FOR TRAMADOL    Request date for pharmacy pick-up:  01/24/2025    Next office visit date: Visit date not found    Atorvastatin, Erythromycin, Hydroxychloroquine, Penicillins, Amlodipine besylate, and Metformin and related     none

## 2025-03-12 ENCOUNTER — APPOINTMENT (OUTPATIENT)
Dept: UROLOGY | Facility: CLINIC | Age: 70
End: 2025-03-12
Payer: MEDICARE

## 2025-03-20 ENCOUNTER — OFFICE VISIT (OUTPATIENT)
Dept: ENDOCRINOLOGY | Age: 70
End: 2025-03-20
Payer: MEDICARE

## 2025-03-20 ENCOUNTER — TELEPHONE (OUTPATIENT)
Dept: CARDIOLOGY | Facility: CLINIC | Age: 70
End: 2025-03-20
Payer: MEDICARE

## 2025-03-20 ENCOUNTER — TELEPHONE (OUTPATIENT)
Age: 70
End: 2025-03-20

## 2025-03-20 ENCOUNTER — OFFICE VISIT (OUTPATIENT)
Age: 70
End: 2025-03-20
Payer: MEDICARE

## 2025-03-20 VITALS
SYSTOLIC BLOOD PRESSURE: 105 MMHG | WEIGHT: 187 LBS | DIASTOLIC BLOOD PRESSURE: 68 MMHG | HEIGHT: 65 IN | BODY MASS INDEX: 31.16 KG/M2 | TEMPERATURE: 97.1 F

## 2025-03-20 VITALS
OXYGEN SATURATION: 97 % | HEIGHT: 65 IN | WEIGHT: 187 LBS | DIASTOLIC BLOOD PRESSURE: 68 MMHG | BODY MASS INDEX: 31.16 KG/M2 | SYSTOLIC BLOOD PRESSURE: 105 MMHG | HEART RATE: 89 BPM

## 2025-03-20 DIAGNOSIS — F11.20 UNCOMPLICATED OPIOID DEPENDENCE (HCC): ICD-10-CM

## 2025-03-20 DIAGNOSIS — M47.812 CERVICAL SPONDYLOSIS: Primary | ICD-10-CM

## 2025-03-20 DIAGNOSIS — G89.29 CHRONIC LOW BACK PAIN, UNSPECIFIED BACK PAIN LATERALITY, UNSPECIFIED WHETHER SCIATICA PRESENT: ICD-10-CM

## 2025-03-20 DIAGNOSIS — E04.2 MULTINODULAR THYROID: ICD-10-CM

## 2025-03-20 DIAGNOSIS — G89.4 CHRONIC PAIN SYNDROME: ICD-10-CM

## 2025-03-20 DIAGNOSIS — E11.65 TYPE 2 DIABETES MELLITUS WITH HYPERGLYCEMIA, WITH LONG-TERM CURRENT USE OF INSULIN (HCC): Primary | ICD-10-CM

## 2025-03-20 DIAGNOSIS — Z79.4 TYPE 2 DIABETES MELLITUS WITH HYPERGLYCEMIA, WITH LONG-TERM CURRENT USE OF INSULIN (HCC): Primary | ICD-10-CM

## 2025-03-20 DIAGNOSIS — M47.812 CERVICAL SPONDYLOSIS WITHOUT MYELOPATHY: ICD-10-CM

## 2025-03-20 DIAGNOSIS — M54.50 CHRONIC LOW BACK PAIN, UNSPECIFIED BACK PAIN LATERALITY, UNSPECIFIED WHETHER SCIATICA PRESENT: ICD-10-CM

## 2025-03-20 DIAGNOSIS — M54.12 CERVICAL RADICULOPATHY: ICD-10-CM

## 2025-03-20 PROCEDURE — 1125F AMNT PAIN NOTED PAIN PRSNT: CPT | Performed by: PHYSICIAN ASSISTANT

## 2025-03-20 PROCEDURE — 3046F HEMOGLOBIN A1C LEVEL >9.0%: CPT | Performed by: PHYSICIAN ASSISTANT

## 2025-03-20 PROCEDURE — 1123F ACP DISCUSS/DSCN MKR DOCD: CPT | Performed by: NURSE PRACTITIONER

## 2025-03-20 PROCEDURE — 99214 OFFICE O/P EST MOD 30 MIN: CPT | Performed by: PHYSICIAN ASSISTANT

## 2025-03-20 PROCEDURE — 1159F MED LIST DOCD IN RCRD: CPT | Performed by: NURSE PRACTITIONER

## 2025-03-20 PROCEDURE — 99215 OFFICE O/P EST HI 40 MIN: CPT | Performed by: NURSE PRACTITIONER

## 2025-03-20 PROCEDURE — 1123F ACP DISCUSS/DSCN MKR DOCD: CPT | Performed by: PHYSICIAN ASSISTANT

## 2025-03-20 PROCEDURE — 82962 GLUCOSE BLOOD TEST: CPT | Performed by: PHYSICIAN ASSISTANT

## 2025-03-20 PROCEDURE — 1125F AMNT PAIN NOTED PAIN PRSNT: CPT | Performed by: NURSE PRACTITIONER

## 2025-03-20 PROCEDURE — 1159F MED LIST DOCD IN RCRD: CPT | Performed by: PHYSICIAN ASSISTANT

## 2025-03-20 RX ORDER — TRAMADOL HYDROCHLORIDE 50 MG/1
50 TABLET ORAL 2 TIMES DAILY PRN
Qty: 60 TABLET | Refills: 0 | Status: SHIPPED | OUTPATIENT
Start: 2025-03-20 | End: 2025-04-19

## 2025-03-20 RX ORDER — FAMOTIDINE 20 MG/1
20 TABLET, FILM COATED ORAL 2 TIMES DAILY
Qty: 60 TABLET | Refills: 3 | Status: SHIPPED | OUTPATIENT
Start: 2025-03-20

## 2025-03-20 RX ORDER — ONDANSETRON 4 MG/1
4 TABLET, ORALLY DISINTEGRATING ORAL 3 TIMES DAILY PRN
Qty: 21 TABLET | Refills: 0 | Status: SHIPPED | OUTPATIENT
Start: 2025-03-20

## 2025-03-20 ASSESSMENT — ENCOUNTER SYMPTOMS
NAUSEA: 0
ABDOMINAL PAIN: 0
VOMITING: 0
SORE THROAT: 0
COUGH: 0
SINUS PRESSURE: 0
SHORTNESS OF BREATH: 0
RHINORRHEA: 0
BLURRED VISION: 1
DIARRHEA: 0
WHEEZING: 0

## 2025-03-20 NOTE — PROGRESS NOTES
Graciela Reddy  (1955)    3/20/2025    Subjective:     Graciela Reddy is 69 y.o. female who complains today of:    Chief Complaint   Patient presents with    Follow-up         Allergies:  Atorvastatin, Erythromycin, Hydroxychloroquine, Penicillins, Amlodipine besylate, and Metformin and related    History reviewed. No pertinent past medical history.  History reviewed. No pertinent surgical history.  Family History   Problem Relation Age of Onset    Diabetes Father     Diabetes Maternal Grandmother      Social History     Socioeconomic History    Marital status:      Spouse name: Not on file    Number of children: Not on file    Years of education: Not on file    Highest education level: Not on file   Occupational History    Not on file   Tobacco Use    Smoking status: Never    Smokeless tobacco: Never   Substance and Sexual Activity    Alcohol use: Never    Drug use: Never    Sexual activity: Not on file   Other Topics Concern    Not on file   Social History Narrative    Not on file     Social Drivers of Health     Financial Resource Strain: Not on file   Food Insecurity: Not on file   Transportation Needs: Not on file   Physical Activity: Not on file   Stress: Not on file   Social Connections: Not on file   Intimate Partner Violence: Not on file   Housing Stability: Not on file       Current Outpatient Medications on File Prior to Visit   Medication Sig Dispense Refill    ondansetron (ZOFRAN-ODT) 4 MG disintegrating tablet Take 1 tablet by mouth 3 times daily as needed for Nausea or Vomiting 21 tablet 0    famotidine (PEPCID) 20 MG tablet Take 1 tablet by mouth 2 times daily 60 tablet 3    insulin regular human (HUMULIN R U-500 KWIKPEN) 500 UNIT/ML SOPN concentrated injection pen Inject 300 units before breakfast, 200 units before lunch and 300 units before dinner 60 mL 5    Tirzepatide 2.5 MG/0.5ML SOAJ Inject 2.5 mg into the skin every 7 days 2 mL 5    Glucagon (GVOKE HYPOPEN 2-PACK) 1

## 2025-03-20 NOTE — TELEPHONE ENCOUNTER
Rec'd fax from [facility]. Pt to be scheduled for out patient injections.    From placed in 's basket and routed as BABITA.

## 2025-03-20 NOTE — TELEPHONE ENCOUNTER
Faxed blood thinner request to Dr Horowitz at [phone] . Requesting permission for patient to hold PLAVIX (7 DAYS).    Waiting for insurance approval.

## 2025-03-20 NOTE — PROGRESS NOTES
echotexture and demonstrates   multiple spongiform nodules. The largest spongiform nodule is in the   midgland, measuring 1.7 x 1.2 x 1.3 cm, TI-RADS category 1,   previously 1.5 x 0.6 x 1.4 cm, with no suspicious features, greater   cystic component currently. Inferior pole mixed cystic/solid nodule   is smaller than previous, measuring approximately 1.5 x 1.1 x 1 cm,   slightly ill-defined borders. No echogenic calculi. Solid portions   appear hypoechoic although this may be due to presence of the   adjacent cysts. At most TIRADS category 3. This nodule was previously   larger and more cystic appearing, 3.4 x 2.3 x 2.8 cm on the prior   study.      LEFT LOBE:   The left lobe of the thyroid measures 1.6 x 2.2 x 4.1. The left lobe   of the thyroid is homogeneous in echotexture and demonstrates   multiple subcentimeter cysts and few spongiform nodules. The largest   spongiform nodule is mid to lower pole, measures 1.0 x 0.8 x 0.7 cm,   previously about 0.8 x 0.5 x 0.5 cm. It has no worrisome features,   TI-RADS category 1.       ISTHMUS:   The isthmus measures approximately 0.37 and is homogeneous in   echotexture and without any identifiable nodules.       CERVICAL LYMPH NODES:   No morphologically abnormal or pathologically enlarged lymph nodes   are seen. Lymph node identified left level 1B appears normal,   measures 0.8 cm short axis.          Review of Systems   Constitutional:  Negative for chills, fatigue and fever.   HENT:  Negative for congestion, ear pain, postnasal drip, rhinorrhea, sinus pressure and sore throat.    Eyes:  Positive for blurred vision. Negative for visual disturbance.   Respiratory:  Negative for cough, shortness of breath and wheezing.    Cardiovascular:  Negative for chest pain, palpitations and leg swelling.   Gastrointestinal:  Negative for abdominal pain, diarrhea, nausea and vomiting.   Endocrine: Negative for cold intolerance, heat intolerance, polydipsia and polyuria.

## 2025-03-20 NOTE — PATIENT INSTRUCTIONS
Increase Humulin U-500 Inject 240 units before breakfast, 200 units before lunch, 240 units before dinner  Increase insulin by 5 units every 3-4 days to get glucose levels in the 100s   Hold Farxiga 10 mg by mouth daily  Continue Mounjaro 2.5 mg injected weekly   Diflucan 150 mg daily   Dexcom G7 Being used- [facility]   Repeat Thyroid US 6/2025  Repeat labs 1 week before your follow-up appointment  Follow-up with me in 6 weeks

## 2025-03-25 DIAGNOSIS — M54.9 DORSALGIA, UNSPECIFIED: ICD-10-CM

## 2025-03-25 DIAGNOSIS — G89.29 OTHER CHRONIC PAIN: ICD-10-CM

## 2025-03-27 RX ORDER — GABAPENTIN 300 MG/1
600 CAPSULE ORAL 3 TIMES DAILY
Qty: 540 CAPSULE | Refills: 0 | Status: SHIPPED | OUTPATIENT
Start: 2025-03-27

## 2025-04-03 ENCOUNTER — TELEPHONE (OUTPATIENT)
Dept: PRIMARY CARE | Facility: CLINIC | Age: 70
End: 2025-04-03
Payer: MEDICARE

## 2025-04-03 DIAGNOSIS — J30.9 ALLERGIC RHINITIS, UNSPECIFIED SEASONALITY, UNSPECIFIED TRIGGER: ICD-10-CM

## 2025-04-03 DIAGNOSIS — J01.90 ACUTE NON-RECURRENT SINUSITIS, UNSPECIFIED LOCATION: Primary | ICD-10-CM

## 2025-04-03 RX ORDER — MINERAL OIL
180 ENEMA (ML) RECTAL DAILY
Qty: 90 TABLET | Refills: 3 | Status: SHIPPED | OUTPATIENT
Start: 2025-04-03

## 2025-04-03 RX ORDER — DOXYCYCLINE 100 MG/1
100 CAPSULE ORAL 2 TIMES DAILY
Qty: 14 CAPSULE | Refills: 0 | Status: SHIPPED | OUTPATIENT
Start: 2025-04-03 | End: 2025-04-10

## 2025-04-03 NOTE — TELEPHONE ENCOUNTER
Patient left voicemail yesterday stating she has a sinus infection ear pain and congestion, mucus goes from yellow to green for 2 weeks  Has tried Musinex and tylenol OTC with no relief    Also needs refill on Allergy med  Please review and advise

## 2025-04-10 DIAGNOSIS — E11.65 TYPE 2 DIABETES MELLITUS WITH HYPERGLYCEMIA, WITH LONG-TERM CURRENT USE OF INSULIN (HCC): ICD-10-CM

## 2025-04-10 DIAGNOSIS — Z79.4 TYPE 2 DIABETES MELLITUS WITH HYPERGLYCEMIA, WITH LONG-TERM CURRENT USE OF INSULIN (HCC): ICD-10-CM

## 2025-04-12 DIAGNOSIS — I10 ESSENTIAL HYPERTENSION: ICD-10-CM

## 2025-04-12 DIAGNOSIS — E04.2 MULTINODULAR THYROID: ICD-10-CM

## 2025-04-12 DIAGNOSIS — E11.65 TYPE 2 DIABETES MELLITUS WITH HYPERGLYCEMIA, WITH LONG-TERM CURRENT USE OF INSULIN (HCC): ICD-10-CM

## 2025-04-12 DIAGNOSIS — Z79.4 TYPE 2 DIABETES MELLITUS WITH HYPERGLYCEMIA, WITH LONG-TERM CURRENT USE OF INSULIN (HCC): ICD-10-CM

## 2025-04-12 LAB
ALBUMIN SERPL-MCNC: 4 G/DL (ref 3.5–4.6)
ALP SERPL-CCNC: 77 U/L (ref 40–130)
ALT SERPL-CCNC: 13 U/L (ref 0–33)
ANION GAP SERPL CALCULATED.3IONS-SCNC: 10 MEQ/L (ref 9–15)
AST SERPL-CCNC: 12 U/L (ref 0–35)
BILIRUB SERPL-MCNC: <0.2 MG/DL (ref 0.2–0.7)
BUN SERPL-MCNC: 28 MG/DL (ref 8–23)
CALCIUM SERPL-MCNC: 9.8 MG/DL (ref 8.5–9.9)
CHLORIDE SERPL-SCNC: 101 MEQ/L (ref 95–107)
CO2 SERPL-SCNC: 27 MEQ/L (ref 20–31)
CREAT SERPL-MCNC: 1.26 MG/DL (ref 0.5–0.9)
GLOBULIN SER CALC-MCNC: 2.8 G/DL (ref 2.3–3.5)
GLUCOSE SERPL-MCNC: 258 MG/DL (ref 70–99)
POTASSIUM SERPL-SCNC: 3.9 MEQ/L (ref 3.4–4.9)
PROT SERPL-MCNC: 6.8 G/DL (ref 6.3–8)
SODIUM SERPL-SCNC: 138 MEQ/L (ref 135–144)
T4 FREE SERPL-MCNC: 1.05 NG/DL (ref 0.84–1.68)
TSH REFLEX: 1.52 UIU/ML (ref 0.44–3.86)

## 2025-04-13 DIAGNOSIS — G45.0 VERTEBRO-BASILAR ARTERY SYNDROME: ICD-10-CM

## 2025-04-13 LAB
CORTISOL COLLECTION INFO: NORMAL
CORTISOL: 11 UG/DL (ref 2.5–19.5)
ESTIMATED AVERAGE GLUCOSE: 269 MG/DL
HBA1C MFR BLD: 11 % (ref 4–6)

## 2025-04-14 LAB — THYROPEROXIDASE IGG SERPL-ACNC: <4 IU/ML (ref 0–25)

## 2025-04-15 ENCOUNTER — RESULTS FOLLOW-UP (OUTPATIENT)
Age: 70
End: 2025-04-15

## 2025-04-15 LAB — TSH RECEP AB SER-ACNC: <1.1 IU/L

## 2025-04-15 RX ORDER — POTASSIUM CHLORIDE 750 MG/1
10 TABLET, EXTENDED RELEASE ORAL DAILY
Qty: 90 TABLET | Refills: 1 | Status: SHIPPED | OUTPATIENT
Start: 2025-04-15

## 2025-04-16 RX ORDER — MECLIZINE HYDROCHLORIDE 25 MG/1
25 TABLET ORAL 3 TIMES DAILY PRN
Qty: 270 TABLET | Refills: 1 | Status: SHIPPED | OUTPATIENT
Start: 2025-04-16

## 2025-04-18 ENCOUNTER — APPOINTMENT (OUTPATIENT)
Dept: PHARMACY | Facility: HOSPITAL | Age: 70
End: 2025-04-18
Payer: MEDICARE

## 2025-04-21 ENCOUNTER — HOSPITAL ENCOUNTER (OUTPATIENT)
Dept: MRI IMAGING | Age: 70
Discharge: HOME | End: 2025-04-23
Payer: MEDICARE

## 2025-04-21 DIAGNOSIS — M47.812 CERVICAL SPONDYLOSIS WITHOUT MYELOPATHY: ICD-10-CM

## 2025-04-21 DIAGNOSIS — M54.12 CERVICAL RADICULOPATHY: ICD-10-CM

## 2025-04-21 DIAGNOSIS — M47.812 CERVICAL SPONDYLOSIS: ICD-10-CM

## 2025-04-21 PROCEDURE — 72141 MRI NECK SPINE W/O DYE: CPT

## 2025-04-22 DIAGNOSIS — Z98.61 CORONARY ANGIOPLASTY STATUS: ICD-10-CM

## 2025-04-22 DIAGNOSIS — I25.10 ATHEROSCLEROTIC HEART DISEASE OF NATIVE CORONARY ARTERY WITHOUT ANGINA PECTORIS: ICD-10-CM

## 2025-04-23 ENCOUNTER — APPOINTMENT (OUTPATIENT)
Dept: PRIMARY CARE | Facility: CLINIC | Age: 70
End: 2025-04-23
Payer: MEDICARE

## 2025-04-23 RX ORDER — METOPROLOL SUCCINATE 50 MG/1
TABLET, EXTENDED RELEASE ORAL
Qty: 135 TABLET | Refills: 1 | Status: SHIPPED | OUTPATIENT
Start: 2025-04-23

## 2025-04-24 ENCOUNTER — RESULTS FOLLOW-UP (OUTPATIENT)
Age: 70
End: 2025-04-24

## 2025-04-28 ENCOUNTER — TELEPHONE (OUTPATIENT)
Age: 70
End: 2025-04-28

## 2025-04-28 NOTE — TELEPHONE ENCOUNTER
C7-T1 PRIYANKA  AUTH #: [id number] VALID : 04/26/2025-10/26/2025  REFERRAL# [id number]    OK to schedule procedure approved as above.   Please note sides/levels approved and date range.   (If applicable, sides/levels approved may differ from those ordered)    TO BE SCHEDULED WITH DR VALDEZ

## 2025-04-29 ENCOUNTER — OFFICE VISIT (OUTPATIENT)
Age: 70
End: 2025-04-29
Payer: MEDICARE

## 2025-04-29 VITALS
SYSTOLIC BLOOD PRESSURE: 138 MMHG | BODY MASS INDEX: 31.16 KG/M2 | TEMPERATURE: 96.9 F | HEIGHT: 65 IN | DIASTOLIC BLOOD PRESSURE: 78 MMHG | WEIGHT: 187 LBS

## 2025-04-29 DIAGNOSIS — G89.4 CHRONIC PAIN SYNDROME: ICD-10-CM

## 2025-04-29 DIAGNOSIS — G89.29 CHRONIC LOW BACK PAIN, UNSPECIFIED BACK PAIN LATERALITY, UNSPECIFIED WHETHER SCIATICA PRESENT: ICD-10-CM

## 2025-04-29 DIAGNOSIS — M47.812 CERVICAL SPONDYLOSIS: ICD-10-CM

## 2025-04-29 DIAGNOSIS — M47.812 CERVICAL SPONDYLOSIS WITHOUT MYELOPATHY: ICD-10-CM

## 2025-04-29 DIAGNOSIS — M54.12 CERVICAL RADICULOPATHY: ICD-10-CM

## 2025-04-29 DIAGNOSIS — M54.50 CHRONIC LOW BACK PAIN, UNSPECIFIED BACK PAIN LATERALITY, UNSPECIFIED WHETHER SCIATICA PRESENT: ICD-10-CM

## 2025-04-29 PROCEDURE — 99214 OFFICE O/P EST MOD 30 MIN: CPT | Performed by: NURSE PRACTITIONER

## 2025-04-29 PROCEDURE — G2211 COMPLEX E/M VISIT ADD ON: HCPCS | Performed by: NURSE PRACTITIONER

## 2025-04-29 PROCEDURE — 1159F MED LIST DOCD IN RCRD: CPT | Performed by: NURSE PRACTITIONER

## 2025-04-29 PROCEDURE — 1123F ACP DISCUSS/DSCN MKR DOCD: CPT | Performed by: NURSE PRACTITIONER

## 2025-04-29 PROCEDURE — 1125F AMNT PAIN NOTED PAIN PRSNT: CPT | Performed by: NURSE PRACTITIONER

## 2025-04-29 PROCEDURE — 1160F RVW MEDS BY RX/DR IN RCRD: CPT | Performed by: NURSE PRACTITIONER

## 2025-04-29 RX ORDER — TRAMADOL HYDROCHLORIDE 50 MG/1
50 TABLET ORAL 2 TIMES DAILY PRN
Qty: 60 TABLET | Refills: 0 | Status: SHIPPED | OUTPATIENT
Start: 2025-04-29 | End: 2025-05-29

## 2025-04-29 ASSESSMENT — ENCOUNTER SYMPTOMS
SORE THROAT: 0
SHORTNESS OF BREATH: 0
ABDOMINAL PAIN: 0

## 2025-04-29 NOTE — PROGRESS NOTES
Graciela Reddy  (1955)    4/29/2025    Subjective:     Graciela Reddy is 69 y.o. female who complains today of:    Chief Complaint   Patient presents with    Follow-up    Back Pain    Neck Pain    Headache         Allergies:  Atorvastatin, Erythromycin, Hydroxychloroquine, Penicillins, Amlodipine besylate, and Metformin and related    History reviewed. No pertinent past medical history.  History reviewed. No pertinent surgical history.  Family History   Problem Relation Age of Onset    Diabetes Father     Diabetes Maternal Grandmother      Social History     Socioeconomic History    Marital status:      Spouse name: Not on file    Number of children: Not on file    Years of education: Not on file    Highest education level: Not on file   Occupational History    Not on file   Tobacco Use    Smoking status: Never    Smokeless tobacco: Never   Substance and Sexual Activity    Alcohol use: Never    Drug use: Never    Sexual activity: Not on file   Other Topics Concern    Not on file   Social History Narrative    Not on file     Social Drivers of Health     Financial Resource Strain: Not on file   Food Insecurity: Not on file   Transportation Needs: Not on file   Physical Activity: Not on file   Stress: Not on file   Social Connections: Not on file   Intimate Partner Violence: Not on file   Housing Stability: Not on file       Current Outpatient Medications on File Prior to Visit   Medication Sig Dispense Refill    Insulin Pen Needle 32G X 6 MM MISC 1 Device by Does not apply route 5 times daily 150 each 3    ondansetron (ZOFRAN-ODT) 4 MG disintegrating tablet Take 1 tablet by mouth 3 times daily as needed for Nausea or Vomiting 21 tablet 0    famotidine (PEPCID) 20 MG tablet Take 1 tablet by mouth 2 times daily 60 tablet 3    naloxone 4 MG/0.1ML LIQD nasal spray 1 spray by Nasal route as needed for Opioid Reversal 1 each 0    insulin regular human (HUMULIN R U-500 KWIKPEN) 500 UNIT/ML SOPN

## 2025-05-13 ENCOUNTER — OFFICE VISIT (OUTPATIENT)
Age: 70
End: 2025-05-13
Payer: MEDICARE

## 2025-05-13 ENCOUNTER — TELEPHONE (OUTPATIENT)
Age: 70
End: 2025-05-13

## 2025-05-13 VITALS
DIASTOLIC BLOOD PRESSURE: 77 MMHG | BODY MASS INDEX: 31.65 KG/M2 | WEIGHT: 190 LBS | OXYGEN SATURATION: 97 % | HEART RATE: 98 BPM | SYSTOLIC BLOOD PRESSURE: 114 MMHG | HEIGHT: 65 IN

## 2025-05-13 DIAGNOSIS — E11.65 TYPE 2 DIABETES MELLITUS WITH HYPERGLYCEMIA, WITH LONG-TERM CURRENT USE OF INSULIN (HCC): Primary | ICD-10-CM

## 2025-05-13 DIAGNOSIS — Z79.4 TYPE 2 DIABETES MELLITUS WITH HYPERGLYCEMIA, WITH LONG-TERM CURRENT USE OF INSULIN (HCC): Primary | ICD-10-CM

## 2025-05-13 DIAGNOSIS — E04.2 MULTINODULAR THYROID: ICD-10-CM

## 2025-05-13 LAB
CHP ED QC CHECK: NORMAL
GLUCOSE BLD-MCNC: 349 MG/DL

## 2025-05-13 PROCEDURE — 1159F MED LIST DOCD IN RCRD: CPT | Performed by: PHYSICIAN ASSISTANT

## 2025-05-13 PROCEDURE — 3046F HEMOGLOBIN A1C LEVEL >9.0%: CPT | Performed by: PHYSICIAN ASSISTANT

## 2025-05-13 PROCEDURE — 82962 GLUCOSE BLOOD TEST: CPT | Performed by: PHYSICIAN ASSISTANT

## 2025-05-13 PROCEDURE — 1125F AMNT PAIN NOTED PAIN PRSNT: CPT | Performed by: PHYSICIAN ASSISTANT

## 2025-05-13 PROCEDURE — 99214 OFFICE O/P EST MOD 30 MIN: CPT | Performed by: PHYSICIAN ASSISTANT

## 2025-05-13 PROCEDURE — 1123F ACP DISCUSS/DSCN MKR DOCD: CPT | Performed by: PHYSICIAN ASSISTANT

## 2025-05-13 RX ORDER — INSULIN HUMAN 500 [IU]/ML
INJECTION, SOLUTION SUBCUTANEOUS
Qty: 60 ML | Refills: 5 | Status: SHIPPED | OUTPATIENT
Start: 2025-05-13

## 2025-05-13 RX ORDER — PIOGLITAZONE 45 MG/1
45 TABLET ORAL DAILY
Qty: 30 TABLET | Refills: 3 | Status: SHIPPED | OUTPATIENT
Start: 2025-05-13

## 2025-05-13 ASSESSMENT — ENCOUNTER SYMPTOMS
VOMITING: 0
SHORTNESS OF BREATH: 0
WHEEZING: 0
SINUS PRESSURE: 0
ABDOMINAL PAIN: 0
RHINORRHEA: 0
DIARRHEA: 0
NAUSEA: 0
BLURRED VISION: 1
COUGH: 0
SORE THROAT: 0

## 2025-05-13 NOTE — PROGRESS NOTES
5/13/2025    Assessment:       Diagnosis Orders   1. Type 2 diabetes mellitus with hyperglycemia, with long-term current use of insulin ([facility])  POCT Glucose    Comprehensive Metabolic Panel    Hemoglobin A1C    Tirzepatide (MOUNJARO) 2.5 MG/0.5ML SOAJ pen    Cortisol Total      2. Multinodular thyroid  Comprehensive Metabolic Panel    T4, Free    TSH reflex to FT4    US HEAD NECK SOFT TISSUE        PLAN:     Increase Humulin U-500 Inject 425 units before breakfast, 395 units before lunch, 425 units before dinner  Increase insulin by 5 units every 3-4 days to get glucose levels in the 100s   Hold Farxiga 10 mg by mouth daily  Resume Mounjaro 2.5 mg injected weekly   Diflucan 150 mg daily   Dexcom G7 Being used- [facility]   Repeat Thyroid US 6/2025  Repeat labs 1 week before your follow-up appointment  Follow-up with me in 6 weeks  Assessment & Plan  1. Type 2 diabetes mellitus: inadequately controlled  - Average glucose level remains elevated at 364, with a GMI of 12%  - Hemoglobin A1c decreased to 11% (04/12/2025)  - Reinitiated Mounjaro 2.5 mg weekly  - Introduced Pioglitazone daily; monitor for lower extremity swelling and discontinue if symptoms arise    2. Multinodular thyroid gland  - Presence of multinodular thyroid gland with cystic nodules  - Annual thyroid ultrasound ordered    Follow-up  - Scheduled in 6 weeks     Orders Placed This Encounter   Procedures    US HEAD NECK SOFT TISSUE     Standing Status:   Future     Expected Date:   5/13/2025     Expiration Date:   5/13/2026    Comprehensive Metabolic Panel     Standing Status:   Future     Expected Date:   5/13/2025     Expiration Date:   5/13/2026    Hemoglobin A1C     Standing Status:   Future     Expected Date:   5/13/2025     Expiration Date:   5/13/2026    T4, Free     Standing Status:   Future     Expected Date:   5/13/2025     Expiration Date:   5/13/2026    TSH reflex to FT4     Standing Status:   Future     Expected Date:   5/13/2025

## 2025-05-14 NOTE — TELEPHONE ENCOUNTER
Prior authorization has been started on Cover My Meds for  Mounjaro 2.5 mg clinical uploaded, authorization pending (Key: [id number])  Mounjaro 2.5MG/0.5ML auto-injectors    MOUNJARO Soln Auto-inj Type of coverage approved: Quantity Limits This approval authorizes your coverage from 01/01/2025 - 12/31/2025,

## 2025-05-15 ENCOUNTER — TELEPHONE (OUTPATIENT)
Dept: PHARMACY | Facility: HOSPITAL | Age: 70
End: 2025-05-15
Payer: MEDICARE

## 2025-05-15 NOTE — TELEPHONE ENCOUNTER
Population Health: Outreach by Ambulatory Pharmacy Team    Patient: Catherine Patel  Primary Care Provider (PCP): Henrry Gallegos MD  Payor: Jessica BUSTOS  Reason: Adherence  Medication(s): simvastatin 20mg tablet  Outcome: Patient Reached: Claims Adherence, patient reports compliance and she will  the med from the pharmacy when running out of supply at home.    Bianca Li, PharmD

## 2025-05-22 ENCOUNTER — HOSPITAL ENCOUNTER (OUTPATIENT)
Age: 70
Discharge: HOME | End: 2025-05-22
Payer: MEDICARE

## 2025-05-22 VITALS
BODY MASS INDEX: 31.65 KG/M2 | HEIGHT: 65 IN | WEIGHT: 190 LBS | SYSTOLIC BLOOD PRESSURE: 122 MMHG | DIASTOLIC BLOOD PRESSURE: 76 MMHG | TEMPERATURE: 97.3 F

## 2025-05-22 DIAGNOSIS — G89.4 CHRONIC PAIN SYNDROME: ICD-10-CM

## 2025-05-22 DIAGNOSIS — M54.12 CERVICAL RADICULOPATHY: Primary | ICD-10-CM

## 2025-05-22 PROCEDURE — 6360000002 HC RX W HCPCS: Performed by: PHYSICAL MEDICINE & REHABILITATION

## 2025-05-22 PROCEDURE — 2500000003 HC RX 250 WO HCPCS: Performed by: PHYSICAL MEDICINE & REHABILITATION

## 2025-05-22 PROCEDURE — 6360000004 HC RX CONTRAST MEDICATION: Performed by: PHYSICAL MEDICINE & REHABILITATION

## 2025-05-22 PROCEDURE — 2580000003 HC RX 258: Performed by: PHYSICAL MEDICINE & REHABILITATION

## 2025-05-22 RX ORDER — DEXAMETHASONE SODIUM PHOSPHATE 10 MG/ML
10 INJECTION, EMULSION INTRAMUSCULAR; INTRAVENOUS ONCE
Status: DISCONTINUED | OUTPATIENT
Start: 2025-05-22 | End: 2025-05-22

## 2025-05-22 RX ORDER — INDOMETHACIN 25 MG/1
0.5 CAPSULE ORAL ONCE
Status: COMPLETED | OUTPATIENT
Start: 2025-05-22 | End: 2025-05-22

## 2025-05-22 RX ORDER — DEXAMETHASONE SODIUM PHOSPHATE 10 MG/ML
10 INJECTION, SOLUTION INTRAMUSCULAR; INTRAVENOUS ONCE
Status: COMPLETED | OUTPATIENT
Start: 2025-05-22 | End: 2025-05-22

## 2025-05-22 RX ORDER — LIDOCAINE HYDROCHLORIDE 10 MG/ML
2 INJECTION, SOLUTION INFILTRATION; PERINEURAL ONCE
Status: COMPLETED | OUTPATIENT
Start: 2025-05-22 | End: 2025-05-22

## 2025-05-22 RX ORDER — SODIUM CHLORIDE 9 MG/ML
3 INJECTION, SOLUTION INTRAMUSCULAR; INTRAVENOUS; SUBCUTANEOUS ONCE
Status: COMPLETED | OUTPATIENT
Start: 2025-05-22 | End: 2025-05-22

## 2025-05-22 RX ADMIN — SODIUM CHLORIDE 3 ML: 9 INJECTION INTRAMUSCULAR; INTRAVENOUS; SUBCUTANEOUS at 13:38

## 2025-05-22 RX ADMIN — LIDOCAINE HYDROCHLORIDE 2 ML: 10 INJECTION, SOLUTION EPIDURAL; INFILTRATION; INTRACAUDAL; PERINEURAL at 13:40

## 2025-05-22 RX ADMIN — IOHEXOL 2 ML: 300 INJECTION, SOLUTION INTRAVENOUS at 13:39

## 2025-05-22 RX ADMIN — DEXAMETHASONE SODIUM PHOSPHATE 10 MG: 10 INJECTION, SOLUTION INTRAMUSCULAR; INTRAVENOUS at 14:39

## 2025-05-22 RX ADMIN — SODIUM BICARBONATE 0.5 MEQ: 84 INJECTION, SOLUTION INTRAVENOUS at 13:39

## 2025-05-22 ASSESSMENT — PAIN SCALES - GENERAL
PAINLEVEL_OUTOF10: 10
PAINLEVEL_OUTOF10: 0

## 2025-05-22 ASSESSMENT — PAIN DESCRIPTION - LOCATION
LOCATION: NECK
LOCATION: NECK

## 2025-05-22 NOTE — PROCEDURES
Cervical Interlaminar Epidural Corticosteroid Injection (ILESI) under Fluoroscopic Guidance         Patient Name: Graciela Reddy  : 1955  Date: 2025   Provider: Edna Le MD    PROCEDURE:  Cervical interlaminar epidural corticosteroid injection (ILESI) at the C7/T1 level under fluoroscopic guidance    INDICATIONS: Graciela Reddy is a 69 y.o. female who presents with symptoms and physical exam findings consistent with cervical radiculopathy. She has cervical paresthesias with a positive Spurling's maneuver on physical exam. She has had persistent pain that limits her activities of daily living such as upper body dressing. The pain is persistent despite conservative measures including home exercise program and physical therapy. She is unable to take anti-inflammatories due to anticoagulation. Given her symptoms, physical exam findings, impairment in activities of daily living, and lack of response to conservative measures, consideration for C7/T1 Cervical interlaminar epidural corticosteroid injection under fluoroscopic guidance was given. Discussed the risks including but not limited to bleeding, infection, worsened pain, damage to surrounding structures, side effects, toxicity, allergic reactions to medications used, need for surgery, headache, vision changes, difficulty with chewing and/or swallowing, pneumothorax, immune and stress-response dysfunction, fat necrosis, avascular necrosis, skin pigmentation changes, blood sugar elevation, as well as catastrophic injury such as vision loss/blindness, paralysis, spinal cord or plexus injury, cerebral brainstem or spinal cord infarction, intrathecal injection, spinal cord puncture, persistent dural leak, arachnoiditis, stroke, bowel/bladder/sexual dysfunction, ventilator dependence, loss of use of the arms and/or legs, and death. Discussed off-label use of corticosteroids and how the Food and Drug Administration (FDA) has not approved corticosteroids

## 2025-06-10 ENCOUNTER — OFFICE VISIT (OUTPATIENT)
Age: 70
End: 2025-06-10
Payer: MEDICARE

## 2025-06-10 VITALS
SYSTOLIC BLOOD PRESSURE: 122 MMHG | HEART RATE: 76 BPM | OXYGEN SATURATION: 98 % | TEMPERATURE: 96.8 F | BODY MASS INDEX: 31.65 KG/M2 | HEIGHT: 65 IN | WEIGHT: 190 LBS | DIASTOLIC BLOOD PRESSURE: 78 MMHG

## 2025-06-10 DIAGNOSIS — M54.12 CERVICAL RADICULOPATHY: ICD-10-CM

## 2025-06-10 DIAGNOSIS — M54.50 CHRONIC LOW BACK PAIN, UNSPECIFIED BACK PAIN LATERALITY, UNSPECIFIED WHETHER SCIATICA PRESENT: ICD-10-CM

## 2025-06-10 DIAGNOSIS — M47.812 CERVICAL SPONDYLOSIS: ICD-10-CM

## 2025-06-10 DIAGNOSIS — G89.4 CHRONIC PAIN SYNDROME: ICD-10-CM

## 2025-06-10 DIAGNOSIS — M54.16 LUMBAR RADICULITIS: ICD-10-CM

## 2025-06-10 DIAGNOSIS — M47.817 LUMBOSACRAL SPONDYLOSIS WITHOUT MYELOPATHY: ICD-10-CM

## 2025-06-10 DIAGNOSIS — M47.812 CERVICAL SPONDYLOSIS WITHOUT MYELOPATHY: ICD-10-CM

## 2025-06-10 DIAGNOSIS — R29.6 FALLS: Primary | ICD-10-CM

## 2025-06-10 DIAGNOSIS — G89.29 CHRONIC LOW BACK PAIN, UNSPECIFIED BACK PAIN LATERALITY, UNSPECIFIED WHETHER SCIATICA PRESENT: ICD-10-CM

## 2025-06-10 PROCEDURE — 1123F ACP DISCUSS/DSCN MKR DOCD: CPT | Performed by: NURSE PRACTITIONER

## 2025-06-10 PROCEDURE — 1125F AMNT PAIN NOTED PAIN PRSNT: CPT | Performed by: NURSE PRACTITIONER

## 2025-06-10 PROCEDURE — 1159F MED LIST DOCD IN RCRD: CPT | Performed by: NURSE PRACTITIONER

## 2025-06-10 PROCEDURE — 1160F RVW MEDS BY RX/DR IN RCRD: CPT | Performed by: NURSE PRACTITIONER

## 2025-06-10 PROCEDURE — 99214 OFFICE O/P EST MOD 30 MIN: CPT | Performed by: NURSE PRACTITIONER

## 2025-06-10 RX ORDER — TRAMADOL HYDROCHLORIDE 50 MG/1
50 TABLET ORAL 2 TIMES DAILY PRN
Qty: 60 TABLET | Refills: 0 | Status: SHIPPED | OUTPATIENT
Start: 2025-06-10 | End: 2025-07-10

## 2025-06-10 ASSESSMENT — ENCOUNTER SYMPTOMS
GASTROINTESTINAL NEGATIVE: 1
TROUBLE SWALLOWING: 0
COUGH: 0
BACK PAIN: 1
SHORTNESS OF BREATH: 0
DIARRHEA: 0
CONSTIPATION: 0
EYES NEGATIVE: 1

## 2025-06-10 NOTE — PROGRESS NOTES
needed pain that helps her function. Hold injections at this time.. All questions were answered. Discussed home exercise program and  smoking cessation.  Relevant imaging and pain generators reviewed. Pt verbalized understanding and agrees with above plan. Pt has chronic pain.     Utox reviewed and appropriate from March 2025. ORT score 7 - moderate risk. Narcan Rx sent in March 2025.    Will continue medications for chronic pain that has been previously directed as they do help pt function with ADL and improve quality of life. Pt is aware goal is to use the least amount of medication possible to allow pt to function and help with quality of life as discussed in detail.  Ideal to keep MME below 50.  Have discussed proper disposal of narcotic medication. Advised to have medications in safe place and locked up/in lock box.  Discussed possible risks of opiate medication with pt, including, but not limited to possible constipation, nausea or vomiting, sedation, urinary retention, dependence and possible addiction. Pt agrees to use medication as prescribed/as directed. Pt advised to not use opiates while driving or operating heavy equipment, or in situations where pt may harm him/herself or others.  Pt is aware that while on narcotics, pt needs to be seen to reassess pain and reassess need for continued medication at that time. The treatment plan will be reassessed each office visit to determine if continuing medications is appropriate and may be discontinued if no such improvement as noted in NDP.   NDP reviewed.  OARRS was reviewed.      Follow up:  Return in about 4 weeks (around 7/8/2025) for review meds and reassess pain.    Haritha Kunz, APRN - CNP

## 2025-06-12 DIAGNOSIS — I10 ESSENTIAL (PRIMARY) HYPERTENSION: ICD-10-CM

## 2025-06-13 DIAGNOSIS — J30.9 ALLERGIC RHINITIS, UNSPECIFIED SEASONALITY, UNSPECIFIED TRIGGER: ICD-10-CM

## 2025-06-16 RX ORDER — ALLOPURINOL 100 MG/1
100 TABLET ORAL DAILY
Qty: 90 TABLET | Refills: 1 | Status: SHIPPED | OUTPATIENT
Start: 2025-06-16

## 2025-06-16 RX ORDER — FLUTICASONE PROPIONATE 50 MCG
2 SPRAY, SUSPENSION (ML) NASAL DAILY
Qty: 48 G | Refills: 1 | Status: SHIPPED | OUTPATIENT
Start: 2025-06-16

## 2025-06-18 ENCOUNTER — TELEPHONE (OUTPATIENT)
Dept: CARDIOLOGY | Facility: CLINIC | Age: 70
End: 2025-06-18

## 2025-06-18 ENCOUNTER — APPOINTMENT (OUTPATIENT)
Dept: PRIMARY CARE | Facility: CLINIC | Age: 70
End: 2025-06-18
Payer: MEDICARE

## 2025-06-18 ENCOUNTER — TELEPHONE (OUTPATIENT)
Dept: PRIMARY CARE | Facility: CLINIC | Age: 70
End: 2025-06-18

## 2025-06-18 VITALS
HEART RATE: 65 BPM | SYSTOLIC BLOOD PRESSURE: 98 MMHG | BODY MASS INDEX: 33.02 KG/M2 | DIASTOLIC BLOOD PRESSURE: 65 MMHG | TEMPERATURE: 98.1 F | WEIGHT: 198.2 LBS | HEIGHT: 65 IN

## 2025-06-18 DIAGNOSIS — E11.42 DIABETIC PERIPHERAL NEUROPATHY (MULTI): ICD-10-CM

## 2025-06-18 DIAGNOSIS — R29.6 FALLING EPISODES: ICD-10-CM

## 2025-06-18 DIAGNOSIS — Z12.31 ENCOUNTER FOR SCREENING MAMMOGRAM FOR MALIGNANT NEOPLASM OF BREAST: ICD-10-CM

## 2025-06-18 DIAGNOSIS — N18.32 STAGE 3B CHRONIC KIDNEY DISEASE (MULTI): ICD-10-CM

## 2025-06-18 DIAGNOSIS — I10 ESSENTIAL HYPERTENSION: Primary | ICD-10-CM

## 2025-06-18 DIAGNOSIS — E11.22 TYPE 2 DIABETES MELLITUS WITH STAGE 3 CHRONIC KIDNEY DISEASE, WITH LONG-TERM CURRENT USE OF INSULIN, UNSPECIFIED WHETHER STAGE 3A OR 3B CKD (MULTI): ICD-10-CM

## 2025-06-18 DIAGNOSIS — E66.01 MORBID OBESITY (MULTI): ICD-10-CM

## 2025-06-18 DIAGNOSIS — Z00.00 ANNUAL WELLNESS VISIT: ICD-10-CM

## 2025-06-18 DIAGNOSIS — E11.21 TYPE 2 DIABETES MELLITUS WITH DIABETIC NEPHROPATHY, WITH LONG-TERM CURRENT USE OF INSULIN: ICD-10-CM

## 2025-06-18 DIAGNOSIS — Z79.4 TYPE 2 DIABETES MELLITUS WITH DIABETIC NEPHROPATHY, WITH LONG-TERM CURRENT USE OF INSULIN: ICD-10-CM

## 2025-06-18 DIAGNOSIS — N18.30 TYPE 2 DIABETES MELLITUS WITH STAGE 3 CHRONIC KIDNEY DISEASE, WITH LONG-TERM CURRENT USE OF INSULIN, UNSPECIFIED WHETHER STAGE 3A OR 3B CKD (MULTI): ICD-10-CM

## 2025-06-18 DIAGNOSIS — R26.81 UNSTEADY GAIT: ICD-10-CM

## 2025-06-18 DIAGNOSIS — Z79.4 TYPE 2 DIABETES MELLITUS WITH STAGE 3 CHRONIC KIDNEY DISEASE, WITH LONG-TERM CURRENT USE OF INSULIN, UNSPECIFIED WHETHER STAGE 3A OR 3B CKD (MULTI): ICD-10-CM

## 2025-06-18 PROCEDURE — 3008F BODY MASS INDEX DOCD: CPT | Performed by: FAMILY MEDICINE

## 2025-06-18 PROCEDURE — 1159F MED LIST DOCD IN RCRD: CPT | Performed by: FAMILY MEDICINE

## 2025-06-18 PROCEDURE — 1036F TOBACCO NON-USER: CPT | Performed by: FAMILY MEDICINE

## 2025-06-18 PROCEDURE — 1158F ADVNC CARE PLAN TLK DOCD: CPT | Performed by: FAMILY MEDICINE

## 2025-06-18 PROCEDURE — 1160F RVW MEDS BY RX/DR IN RCRD: CPT | Performed by: FAMILY MEDICINE

## 2025-06-18 PROCEDURE — 3074F SYST BP LT 130 MM HG: CPT | Performed by: FAMILY MEDICINE

## 2025-06-18 PROCEDURE — G0439 PPPS, SUBSEQ VISIT: HCPCS | Performed by: FAMILY MEDICINE

## 2025-06-18 PROCEDURE — 4010F ACE/ARB THERAPY RXD/TAKEN: CPT | Performed by: FAMILY MEDICINE

## 2025-06-18 PROCEDURE — 3078F DIAST BP <80 MM HG: CPT | Performed by: FAMILY MEDICINE

## 2025-06-18 PROCEDURE — 1170F FXNL STATUS ASSESSED: CPT | Performed by: FAMILY MEDICINE

## 2025-06-18 ASSESSMENT — ACTIVITIES OF DAILY LIVING (ADL)
TAKING_MEDICATION: INDEPENDENT
DRESSING: INDEPENDENT
MANAGING_FINANCES: INDEPENDENT
GROCERY_SHOPPING: NEEDS ASSISTANCE
DOING_HOUSEWORK: TOTAL CARE
BATHING: INDEPENDENT

## 2025-06-18 ASSESSMENT — PATIENT HEALTH QUESTIONNAIRE - PHQ9
1. LITTLE INTEREST OR PLEASURE IN DOING THINGS: SEVERAL DAYS
SUM OF ALL RESPONSES TO PHQ9 QUESTIONS 1 AND 2: 2
2. FEELING DOWN, DEPRESSED OR HOPELESS: SEVERAL DAYS

## 2025-06-18 NOTE — TELEPHONE ENCOUNTER
Spoke with patient    She said when she was in she didn't know the type of walker she would like    She said she needs a rolator type of walker with the breaks and seat     Would like order sent to [facility] on [facility] in [place]   (added to her pharmacies)

## 2025-06-18 NOTE — TELEPHONE ENCOUNTER
Patient requesting call back from clinical regarding a walker  was going to order for her during her appt this morning 6/18/25 - please contact patient at [phone]

## 2025-06-19 PROBLEM — N18.32 STAGE 3B CHRONIC KIDNEY DISEASE (MULTI): Status: ACTIVE | Noted: 2023-10-16

## 2025-06-19 ASSESSMENT — ENCOUNTER SYMPTOMS
WEAKNESS: 1
FATIGUE: 1
ARTHRALGIAS: 1
NECK PAIN: 1
BACK PAIN: 1

## 2025-06-19 NOTE — PROGRESS NOTES
Maciej Alexander is here for her annual wellness visit.  She states that her major concern at this point is chronic neck thoracic and low back pain.  She is currently seeing pain management at [facility] and has received a series of epidural injections.  This has been somewhat helpful only.  She is scheduled for an MRI of her lumbar spine in the next 1 to 2 weeks.  She continues to follow-up with [facility] endocrinology for control of her diabetes.  Recent A1c apparently improved at 11.0.  She also is requesting a walker because of frequent falling.  She denies any lightheadedness or dizziness.  Her legs seem to give out on her.  She has bruising on both knees.  She denies any head injury.  She recently saw Dr. Cox for cardiology care.  She also follows up with pulmonology and nephrology as well.  She continues on her meds noted    Patient ID: Catherine Patel is a 69 y.o. female who presents for Medicare Annual Wellness Visit Subsequent (awv):    Problem List Items Addressed This Visit       Essential hypertension - Primary    Relevant Orders    Comprehensive Metabolic Panel    Lipid Panel    CBC     Other Visit Diagnoses         Encounter for screening mammogram for malignant neoplasm of breast        Relevant Orders    BI mammo bilateral screening tomosynthesis           Medical History[1]   Surgical History[2]   Family History[3]   Social History     Socioeconomic History    Marital status:      Spouse name: Not on file    Number of children: Not on file    Years of education: Not on file    Highest education level: Not on file   Occupational History    Not on file   Tobacco Use    Smoking status: Former     Types: Cigarettes    Smokeless tobacco: Never   Substance and Sexual Activity    Alcohol use: Not Currently    Drug use: Never    Sexual activity: Not on file   Other Topics Concern    Not on file   Social History Narrative    Not on file     Social Drivers of Health     Financial Resource  Strain: Not on file   Food Insecurity: Not on file   Transportation Needs: Not on file   Physical Activity: Not on file   Stress: Not on file   Social Connections: Not on file   Intimate Partner Violence: Not on file   Housing Stability: Not on file      Erythromycin, Hydroxychloroquine, Penicillins, Adhesive tape-silicones, Amlodipine, and Atorvastatin   Current Medications[4]    Immunization History   Administered Date(s) Administered    COVID-19, mRNA, LNP-S, PF, 30 mcg/0.3 mL dose 04/17/2021, 05/08/2021, 12/23/2021    Flu vaccine (IIV4), preservative free *Check age/dose* 10/19/2016, 10/03/2017, 10/01/2019    Flu vaccine, quadrivalent, high-dose, preservative free, age 65y+ (FLUZONE) 12/23/2021, 02/02/2023    Flu vaccine, quadrivalent, no egg protein, age 6 month or greater (FLUCELVAX) 12/09/2018    Flu vaccine, quadrivalent, recombinant, preservative free, adult (FLUBLOK) 11/06/2019    Flu vaccine, trivalent, preservative free, age 6 months and greater (Fluarix/Fluzone/Flulaval) 12/02/2015    Hepatitis B vaccine, adult *Check Product/Dose* 09/13/2010, 10/18/2010, 04/08/2011    Influenza, Unspecified 09/01/2013, 10/13/2014    Influenza, injectable, quadrivalent 10/13/2020    PPD Test 05/16/2011    Pfizer COVID-19 vaccine, 12 years and older, (30mcg/0.3mL) (Comirnaty) 11/09/2024    Pneumococcal conjugate vaccine, 13-valent (PREVNAR 13) 05/31/2022    Pneumococcal polysaccharide vaccine, 23-valent, age 2 years and older (PNEUMOVAX 23) 03/11/2016    Tdap vaccine, age 7 year and older (BOOSTRIX, ADACEL) 10/17/2017    Zoster vaccine, recombinant, adult (SHINGRIX) 11/06/2019, 10/13/2020, 02/06/2021    Zoster, Unspecified 10/01/2019        Review of Systems   Constitutional:  Positive for fatigue.   Musculoskeletal:  Positive for arthralgias, back pain, gait problem and neck pain.   Neurological:  Positive for weakness.   All other systems reviewed and are negative.       Vitals:    06/18/25 1005   BP: 98/65   Pulse: 65    Temp: 36.7 °C (98.1 °F)     Vitals:    06/18/25 1005   Weight: 89.9 kg (198 lb 3.2 oz)      Physical Exam  Constitutional:       General: She is not in acute distress.     Appearance: Normal appearance.   Cardiovascular:      Rate and Rhythm: Normal rate and regular rhythm.      Pulses: Normal pulses.      Heart sounds: Normal heart sounds.   Pulmonary:      Effort: Pulmonary effort is normal. No respiratory distress.      Breath sounds: Normal breath sounds. No wheezing or rales.   Neurological:      General: No focal deficit present.      Mental Status: She is alert and oriented to person, place, and time. Mental status is at baseline.   Psychiatric:         Mood and Affect: Mood normal.         Thought Content: Thought content normal.         Judgment: Judgment normal.          ASSESSMENT/PLAN: Annual wellness visit.  Check CBC lipid profile and CMP.  Recommended screening mammogram.  Colonoscopy and eye exams are up-to-date.  Recommended RSV vaccination update.    Hypertension stable.  Continue current meds noted.    Diabetes mellitus type 2.  Recommended close follow-up with [facility] endocrinology.    Hyperlipidemia.  Check lipid profile as noted.  Continue simvastatin daily.    Chronic cervical and lumbar pain.  Proceed with MRI as ordered.  Follow-up closely with pain management and orthopedics as scheduled.    Recurrent falling episodes.  Recommended physical therapy evaluation.  Will send order for a wheeled walker to help with gait.  We discussed the importance of fall prevention.    Chronic kidney disease managed by nephrology    COPD followed by pulmonology.  Continue inhalers as noted  Obstructive sleep apnea also managed by pulmonology    Coronary artery disease followed by cardiology.    History of thyroid nodules currently being followed by endocrinology    Follow-up with various consultants as noted  Continue current meds  Follow-up 6 months and call as needed              [1]   Past Medical  History:  Diagnosis Date    Body mass index (BMI)40.0-44.9, adult 07/29/2022    BMI 40.0-44.9, adult    Carpal tunnel syndrome, left upper limb 09/16/2021    Carpal tunnel syndrome of left wrist    Dizziness and giddiness 09/16/2021    Postural dizziness    History of falling 09/08/2022    Status post fall    Localized edema 07/29/2022    Edema leg    Morbid (severe) obesity due to excess calories (Multi) 09/29/2022    Morbid obesity with BMI of 40.0-44.9, adult    Pain in right hip 01/20/2022    Right hip pain    Personal history of other endocrine, nutritional and metabolic disease 01/20/2022    History of morbid obesity    Personal history of other specified conditions 10/23/2021    History of vertigo    Personal history of other specified conditions 07/13/2022    History of urinary frequency    Personal history of other specified conditions 08/30/2022    History of edema    Personal history of other specified conditions 08/30/2022    History of shortness of breath    Pneumonia, unspecified organism     Pneumonia of right lung due to infectious organism, unspecified part of lung    Shortness of breath 07/29/2022    Shortness of breath on exertion    Syncope and collapse 09/16/2021    Near syncope   [2]   Past Surgical History:  Procedure Laterality Date    MR HEAD ANGIO WO IV CONTRAST  3/7/2020    MR HEAD ANGIO WO IV CONTRAST 3/7/2020 [facility]    MR NECK ANGIO WO IV CONTRAST  3/7/2020    MR NECK ANGIO WO IV CONTRAST 3/7/2020 [facility]    OTHER SURGICAL HISTORY  01/11/2022    Eye surgery    OTHER SURGICAL HISTORY  01/11/2022    Appendectomy    OTHER SURGICAL HISTORY  01/11/2022    Cyst excision    OTHER SURGICAL HISTORY  01/11/2022    Cardiac catheterization with stent placement    OTHER SURGICAL HISTORY  01/11/2022    Tonsillectomy with adenoidectomy    OTHER SURGICAL HISTORY  01/11/2022    Esophagogastroduodenoscopy    OTHER SURGICAL HISTORY  01/11/2022    Ankle surgery    OTHER SURGICAL  HISTORY  2022     section    OTHER SURGICAL HISTORY  11/10/2022    Complete colonoscopy   [3]   Family History  Problem Relation Name Age of Onset    Arthritis Mother      Hypertension Mother      Lung cancer Mother      COPD Father      Other (CARDIAC DISORDER) Father      Hyperlipidemia Father      Hypertension Father      Diabetes Father      Other (MESONEPHROMA, BENIGN) Father      Colonic polyp Sister      Thyroid cancer Sister      Other (HEART VALVE REPLACED) Father's Sister     [4]   Current Outpatient Medications   Medication Sig Dispense Refill    albuterol 90 mcg/actuation inhaler INHALE 1 TO 2 PUFFS BY MOUTH EVERY 4 TO 6 HOURS AS NEEDED 18 g 3    alcohol swabs (Alcohol Pads) pads, medicated Apply 1 Swab topically once daily. 100 each 3    allopurinol (Zyloprim) 100 mg tablet TAKE 1 TABLET BY MOUTH EVERY DAY 90 tablet 1    blood sugar diagnostic (Blood Glucose Test) strip 1 strip once daily. 100 strip 3    calcium carbonate-vitamin D3 (Calcium 600 + D,3,) 600 mg-10 mcg (400 unit) tablet Take 1 tablet by mouth once daily.      clopidogrel (Plavix) 75 mg tablet TAKE 1 TABLET DAILY 90 tablet 1    ergocalciferol (Vitamin D-2) 1.25 MG (44596 UT) capsule TAKE 1 CAPSULE (50,000 UNITS) BY MOUTH EVERY 14 (FOURTEEN) DAYS. 6 capsule 3    estradiol (Estrace) 0.01 % (0.1 mg/gram) vaginal cream Apply nightly 1 gram for 2 weeks, then 3 times per week. (Patient taking differently: if needed. Apply nightly 1 gram for 2 weeks, then 3 times per week.) 42.5 g 5    fexofenadine (Allegra) 180 mg tablet Take 1 tablet (180 mg) by mouth once daily. 90 tablet 3    fluticasone (Flonase) 50 mcg/actuation nasal spray USE 2 SPRAYS INTO EACH NOSTRIL ONCE DAILY 48 g 1    fluticasone propion-salmeteroL (Advair Diskus) 500-50 mcg/dose diskus inhaler Inhale 1 puff 2 times a day. Rinse mouth with water after use to reduce aftertaste and incidence of candidiasis. Do not swallow.      furosemide (Lasix) 20 mg tablet Take 1  tablet (20 mg) by mouth once daily. (Patient taking differently: Take 1 tablet (20 mg) by mouth 2 times daily (morning and late afternoon).) 90 tablet 3    gabapentin (Neurontin) 300 mg capsule TAKE 2 CAPSULES (600 MG) BY MOUTH 3 TIMES A  capsule 0    Gvoke HypoPen 2-Pack 1 mg/0.2 mL auto-injector Inject 1 mg into the skin every 15 minutes as needed (Glucose less than 60 or if symptomatic)      HumuLIN R U-500, Conc, Kwikpen 500 unit/mL (3 mL) CONCENTRATED injection Inject 140 Units under the skin 2 times a day. (Patient taking differently: Inject under the skin. 495u in the morning, 475u at lunch and 495u at dinner)      ipratropium-albuteroL (Duo-Neb) 0.5-2.5 mg/3 mL nebulizer solution Inhale. TAKE PER DIRECTED      lancets misc Use to test blood sugar once daily 100 each 3    lisinopril 40 mg tablet Take 1 tablet (40 mg) by mouth once daily. 90 tablet 3    loperamide (Imodium A-D) 2 mg capsule Take 1 capsule (2 mg) by mouth once daily as needed. TAKE PER DIRECTED      meclizine (Antivert) 25 mg tablet TAKE 1 TABLET BY MOUTH THREE TIMES A DAY AS NEEDED (Patient taking differently: Take 1 tablet (25 mg) by mouth 2 times a day.) 270 tablet 1    metoprolol succinate XL (Toprol-XL) 50 mg 24 hr tablet TAKE 1 TABLET IN IN THE MORNING AND 1/2 TABLET IN IN THE EVENING FOR TOTAL OF 75MG DAILY 135 tablet 1    nitroglycerin (Nitrostat) 0.4 mg SL tablet Place 1 tablet (0.4 mg) under the tongue every 5 minutes if needed for chest pain.      pantoprazole (ProtoNix) 40 mg EC tablet Take 1 tablet (40 mg) by mouth 2 times a day. 180 tablet 1    potassium chloride CR 10 mEq ER tablet TAKE 1 TABLET DAILY 90 tablet 1    sertraline (Zoloft) 100 mg tablet TAKE 1 AND 1/2 TABLETS BY MOUTH DAILY 135 tablet 1    simvastatin (Zocor) 20 mg tablet Take 1 tablet (20 mg) by mouth once daily. 90 tablet 1    Spiriva Respimat 1.25 mcg/actuation inhaler Inhale 2 puffs once daily.      clotrimazole-betamethasone (Lotrisone) cream Twice daily  to area of irritation x 2 weeks, then 2-3 x per week (Patient not taking: No sig reported) 15 g 0    cyanocobalamin (Vitamin B-12) 1,000 mcg tablet Take 1 tablet (1,000 mcg) by mouth once daily. TAKE PER DIRECTED (Patient not taking: No sig reported)      dicyclomine (Bentyl) 20 mg tablet Take 1 tablet (20 mg) by mouth 3 times a day. TAKE PER DIRECTED (Patient not taking: No sig reported)      icosapent ethyL (Vascepa) 1 gram capsule Take 1 capsule (1 g) by mouth 2 times daily (morning and late afternoon). (Patient not taking: Reported on 6/18/2025) 180 capsule 3    nystatin (Mycostatin) 100,000 unit/gram powder Apply 1 Application topically 2 times a day. Twice daily x 2 weeks, then 2-3x per week as needed to genitals, between thighs (Patient not taking: Reported on 6/18/2025) 60 g 1    propranolol LA (Inderal LA) 60 mg 24 hr capsule Take 1 capsule (60 mg) by mouth once daily. (Patient not taking: Reported on 1/8/2025)      Wixela Inhub 250-50 mcg/dose diskus inhaler TAKE 1 PUFF BY MOUTH TWICE A DAY (Patient not taking: Reported on 1/8/2025) 180 each 1     No current facility-administered medications for this visit.

## 2025-06-26 DIAGNOSIS — Z79.4 TYPE 2 DIABETES MELLITUS WITH HYPERGLYCEMIA, WITH LONG-TERM CURRENT USE OF INSULIN (HCC): ICD-10-CM

## 2025-06-26 DIAGNOSIS — E11.65 TYPE 2 DIABETES MELLITUS WITH HYPERGLYCEMIA, WITH LONG-TERM CURRENT USE OF INSULIN (HCC): ICD-10-CM

## 2025-06-26 DIAGNOSIS — E04.2 MULTINODULAR THYROID: ICD-10-CM

## 2025-06-26 LAB
ALBUMIN SERPL-MCNC: 3.8 G/DL (ref 3.5–4.6)
ALP SERPL-CCNC: 92 U/L (ref 40–130)
ALT SERPL-CCNC: 10 U/L (ref 0–33)
ANION GAP SERPL CALCULATED.3IONS-SCNC: 13 MEQ/L (ref 9–15)
AST SERPL-CCNC: 10 U/L (ref 0–35)
BILIRUB SERPL-MCNC: <0.2 MG/DL (ref 0.2–0.7)
BUN SERPL-MCNC: 38 MG/DL (ref 8–23)
CALCIUM SERPL-MCNC: 9.3 MG/DL (ref 8.5–9.9)
CHLORIDE SERPL-SCNC: 95 MEQ/L (ref 95–107)
CO2 SERPL-SCNC: 28 MEQ/L (ref 20–31)
CORTISOL COLLECTION INFO: NORMAL
CORTISOL: 8.4 UG/DL (ref 2.5–19.5)
CREAT SERPL-MCNC: 1.57 MG/DL (ref 0.5–0.9)
GLOBULIN SER CALC-MCNC: 3 G/DL (ref 2.3–3.5)
GLUCOSE SERPL-MCNC: 314 MG/DL (ref 70–99)
POTASSIUM SERPL-SCNC: 4.8 MEQ/L (ref 3.4–4.9)
PROT SERPL-MCNC: 6.8 G/DL (ref 6.3–8)
SODIUM SERPL-SCNC: 136 MEQ/L (ref 135–144)
T4 FREE SERPL-MCNC: 0.94 NG/DL (ref 0.84–1.68)
TSH REFLEX: 1.22 UIU/ML (ref 0.44–3.86)

## 2025-06-27 LAB
ESTIMATED AVERAGE GLUCOSE: 332 MG/DL
HBA1C MFR BLD: 13.2 % (ref 4–6)

## 2025-06-30 ENCOUNTER — APPOINTMENT (OUTPATIENT)
Dept: CARDIOLOGY | Facility: CLINIC | Age: 70
End: 2025-06-30
Payer: MEDICARE

## 2025-06-30 VITALS
HEART RATE: 64 BPM | WEIGHT: 203.2 LBS | HEIGHT: 65 IN | DIASTOLIC BLOOD PRESSURE: 62 MMHG | SYSTOLIC BLOOD PRESSURE: 106 MMHG | BODY MASS INDEX: 33.85 KG/M2

## 2025-06-30 DIAGNOSIS — I50.32 CHRONIC DIASTOLIC HEART FAILURE, NYHA CLASS 1: ICD-10-CM

## 2025-06-30 DIAGNOSIS — Z98.61 HISTORY OF PTCA: Primary | ICD-10-CM

## 2025-06-30 DIAGNOSIS — I10 ESSENTIAL HYPERTENSION: ICD-10-CM

## 2025-06-30 DIAGNOSIS — E78.2 MIXED HYPERLIPIDEMIA: ICD-10-CM

## 2025-06-30 DIAGNOSIS — I25.119 ATHEROSCLEROSIS OF NATIVE CORONARY ARTERY OF NATIVE HEART WITH ANGINA PECTORIS: ICD-10-CM

## 2025-06-30 PROCEDURE — 1036F TOBACCO NON-USER: CPT | Performed by: INTERNAL MEDICINE

## 2025-06-30 PROCEDURE — 3008F BODY MASS INDEX DOCD: CPT | Performed by: INTERNAL MEDICINE

## 2025-06-30 PROCEDURE — 1159F MED LIST DOCD IN RCRD: CPT | Performed by: INTERNAL MEDICINE

## 2025-06-30 PROCEDURE — 3078F DIAST BP <80 MM HG: CPT | Performed by: INTERNAL MEDICINE

## 2025-06-30 PROCEDURE — 4010F ACE/ARB THERAPY RXD/TAKEN: CPT | Performed by: INTERNAL MEDICINE

## 2025-06-30 PROCEDURE — 3074F SYST BP LT 130 MM HG: CPT | Performed by: INTERNAL MEDICINE

## 2025-06-30 PROCEDURE — 99214 OFFICE O/P EST MOD 30 MIN: CPT | Performed by: INTERNAL MEDICINE

## 2025-06-30 NOTE — PATIENT INSTRUCTIONS
6 month follow up appointment  Please have Fasting Labs done 1 week before     DID YOU KNOW  We have a pharmacy here in [facility].  They can fill all prescriptions, not just cardiac medications.  Prescriptions from other pharmacies can easily be transferred to the  pharmacy by the  pharmacist on site.   pharmacies offer FREE HOME DELIVERY on medications to anywhere in [place]. They can sync your medications. Typically prescriptions can be ready in 10 - 15 minutes. If pharmacy is unable to fill your  prescription or if cost is more than your paying now the Pharmacist can easily transfer back to your Pharmacy of choice. Pharmacy phone # [phone].   Please bring all medicines, vitamins, and herbal supplements with you in original bottles to every appointment  Prescriptions will not be filled unless you are compliant with your follow up appointments or have a follow up appointment scheduled as per instruction of your physician. Refills should be requested at the time of your visit.

## 2025-06-30 NOTE — PROGRESS NOTES
She has been doing well since her last visit.  Patient:  Catherine Patel  YOB: 1955  MRN: [id number]     Chief Complaint   Patient presents with    Follow-up     Patient states today is her yearly appointment       HPI:      Catherine Patel is a 69 y.o. female who returns today for cardiac follow-up.  She was previously followed on a regular basis by Dr. Peacock. She has a history of atherosclerotic heart disease with remote PCI. She has a history of COPD, hypertension, hyperlipidemia, and type 2 diabetes mellitus. She has a history of chronic venous insufficiency. She has chronic kidney disease for which she sees Dr. Velazco.  She has some chronic shortness of breath. She primarily attributes this to underlying asthma and COPD. She has chronic back pain.     At the time of her visit in May 2025 she noted gradually worsening exertional shortness of breath.  This had progressed to the point where she was getting winded after walking 10 to 20 feet.  Her symptoms improved rapidly when she stopped to rest.  She also noted a mild increase in peripheral edema.  A Lexiscan myocardial perfusion study on June 20, 2024 and was negative for ischemia or infarction.  Calculated LV ejection fraction 74%.  CT scan of the chest May 20, 2024 showed scattered pulmonary opacities similar to December 2022.  A 1.4 cm thyroid nodule was incidentally noted.      She denies any chest pain. She denies any orthopnea, PND, or increasing peripheral edema. She denies any palpitations, lightheadedness, near-syncope, or syncope. She denies any fever, chills, or cough. She denies any nausea, vomiting, or diaphoresis. She denies any hemoptysis, hematemesis, melena, or hematochezia.  Lab studies April 12, 2025 showed a normal CMP with exception of BUN 28, creatinine 1.26, and glucose 238.  TSH was normal.  Hemoglobin A1c was 11.0.  Lipid profile September 17, 2024 showed a cholesterol 160 with triglycerides 282, HDL 31, and LDL  73.   she will continue on Plavix, lisinopril, Toprol-XL, and simvastatin.   other details as noted below.      Objective:     Vitals:    06/30/25 1235   BP: 106/62   Pulse: 64         Wt Readings from Last 4 Encounters:   06/18/25 89.9 kg (198 lb 3.2 oz)   01/08/25 83 kg (183 lb)   10/31/24 91.6 kg (202 lb)   10/23/24 90.3 kg (199 lb)       Allergies:     Allergies   Allergen Reactions    Erythromycin Itching and Rash    Hydroxychloroquine Itching and Rash    Penicillins Itching and Rash    Adhesive Tape-Silicones Other     Skin red,raw with prolonged use    Amlodipine Swelling     Lower legs and feet    Atorvastatin Swelling     Lower legs and feet        Medications:     Current Outpatient Medications   Medication Instructions    albuterol 90 mcg/actuation inhaler INHALE 1 TO 2 PUFFS BY MOUTH EVERY 4 TO 6 HOURS AS NEEDED    alcohol swabs (Alcohol Pads) pads, medicated 1 Swab, topical (top), Daily    allopurinol (ZYLOPRIM) 100 mg, oral, Daily    blood sugar diagnostic (Blood Glucose Test) strip 1 strip, miscellaneous, Daily    calcium carbonate-vitamin D3 (Calcium 600 + D,3,) 600 mg-10 mcg (400 unit) tablet 1 tablet, Daily    clopidogrel (PLAVIX) 75 mg, oral, Daily    cyanocobalamin (Vitamin B-12) 1,000 mcg tablet 1 tablet, Daily    ergocalciferol (VITAMIN D-2) 50,000 Units, oral, Every 14 days    estradiol (Estrace) 0.01 % (0.1 mg/gram) vaginal cream Apply nightly 1 gram for 2 weeks, then 3 times per week.    fexofenadine (ALLEGRA) 180 mg, oral, Daily    fluticasone (Flonase) 50 mcg/actuation nasal spray 2 sprays, Each Nostril, Daily    fluticasone propion-salmeteroL (Advair Diskus) 500-50 mcg/dose diskus inhaler 1 puff, 2 times daily RT    furosemide (LASIX) 20 mg, oral, Daily    gabapentin (NEURONTIN) 600 mg, oral, 3 times daily    Gvoke HypoPen 2-Pack 1 mg/0.2 mL auto-injector Inject 1 mg into the skin every 15 minutes as needed (Glucose less than 60 or if symptomatic)    HumuLIN R U-500 (Conc) Kwikpen 140  Units, 2 times daily    icosapent ethyL (VASCEPA) 1 g, oral, 2 times daily (morning and late afternoon)    ipratropium-albuteroL (Duo-Neb) 0.5-2.5 mg/3 mL nebulizer solution Inhale. TAKE PER DIRECTED    lancets misc Use to test blood sugar once daily    lisinopril 40 mg, oral, Daily    loperamide (Imodium A-D) 2 mg capsule 1 capsule, Daily PRN    meclizine (ANTIVERT) 25 mg, oral, 3 times daily PRN    metoprolol succinate XL (Toprol-XL) 50 mg 24 hr tablet TAKE 1 TABLET IN IN THE MORNING AND 1/2 TABLET IN IN THE EVENING FOR TOTAL OF 75MG DAILY    nitroglycerin (NITROSTAT) 0.4 mg, Every 5 min PRN    pantoprazole (PROTONIX) 40 mg, oral, 2 times daily    potassium chloride CR 10 mEq ER tablet 10 mEq, oral, Daily    propranolol LA (Inderal LA) 60 mg 24 hr capsule 1 capsule, Daily    sertraline (ZOLOFT) 150 mg, oral, Daily, Take 1 and 1/2 (one and one-half) tablets by mouth daily.    simvastatin (ZOCOR) 20 mg, oral, Daily    Spiriva Respimat 1.25 mcg/actuation inhaler 2 puffs, Daily       Physical Examination:   GENERAL:  Well developed, well nourished, in no acute distress.  CHEST:  Symmetric and nontender.  NEURO/PSYCH:  Alert and oriented times three with approppriate behavior and responses.  NECK:  Supple, no JVD, no bruit.  LUNGS:  Clear to auscultation bilaterally, normal respiratory effort.  HEART:  Rate and rhythm regular with no evident murmur, no gallop appreciated.        There are no rubs, clicks or heaves.  EXTREMITIES:  Warm with good color, no clubbing or cyanosis.  There is no edema noted.  PERIPHERAL VASCULAR:  Pulses present and equally palpable; 2+ throughout.      Lab:     CBC:   Lab Results   Component Value Date    WBC 8.4 04/05/2024    RBC 4.06 04/05/2024    HGB 10.9 (L) 04/05/2024    HCT 34.2 (L) 04/05/2024     04/05/2024        CMP:    Lab Results   Component Value Date     (L) 04/05/2024    K 4.7 04/05/2024    CL 94 (L) 04/05/2024    CO2 28 04/05/2024    BUN 42 (H) 04/05/2024     CREATININE 2.01 (H) 04/05/2024    GLUCOSE 426 (H) 04/05/2024    CALCIUM 10.0 04/05/2024       Lipid Profile:    Lab Results   Component Value Date    TRIG 666 (H) 04/05/2024    HDL 31.2 04/05/2024    LDLCALC  04/05/2024      Comment:      The calculation of LDL and VLDL are inaccurate when the Triglycerides are greater than 400 mg/dL or when the patient is non-fasting. If LDL measurement is necessary contact the testing laboratory for an alternative LDL assay.                                  Near   Borderline      AGE      Desirable  Optimal    High     High     Very High     0-19 Y     0 - 109     ---    110-129   >/= 130     ----    20-24 Y     0 - 119     ---    120-159   >/= 160     ----      >24 Y     0 -  99   100-129  130-159   160-189     >/=190         BMP:  Lab Results   Component Value Date     (L) 04/05/2024     05/31/2022     06/13/2021     (L) 06/12/2021    K 4.7 04/05/2024    K 4.3 05/31/2022    K 3.5 06/13/2021    K 3.5 06/12/2021    CL 94 (L) 04/05/2024     05/31/2022     06/13/2021     06/12/2021    CO2 28 04/05/2024    CO2 27 05/31/2022    CO2 23 06/13/2021    CO2 21 06/12/2021    BUN 42 (H) 04/05/2024    BUN 32 (H) 05/31/2022    BUN 24 (H) 06/13/2021    BUN 28 (H) 06/12/2021    CREATININE 2.01 (H) 04/05/2024    CREATININE 1.88 (H) 05/31/2022    CREATININE 1.07 (H) 06/13/2021    CREATININE 1.22 (H) 06/12/2021       CBC:  Lab Results   Component Value Date    WBC 8.4 04/05/2024    WBC 9.4 05/31/2022    WBC 10.0 06/13/2021    WBC 11.9 (H) 06/12/2021    RBC 4.06 04/05/2024    RBC 3.20 (L) 05/31/2022    RBC 3.18 (L) 06/13/2021    RBC 3.12 (L) 06/12/2021    HGB 10.9 (L) 04/05/2024    HGB 10.7 (L) 05/31/2022    HGB 8.7 (L) 06/13/2021    HGB 8.5 (L) 06/12/2021    HCT 34.2 (L) 04/05/2024    HCT 34.4 (L) 05/31/2022    HCT 28.8 (L) 06/13/2021    HCT 27.8 (L) 06/12/2021    MCV 84 04/05/2024     (H) 05/31/2022    MCV 91 06/13/2021    MCV 89 06/12/2021    MCH  26.8 04/05/2024    MCHC 31.9 (L) 04/05/2024    MCHC 31.1 (L) 05/31/2022    MCHC 30.2 (L) 06/13/2021    MCHC 30.6 (L) 06/12/2021    RDW 16.6 (H) 04/05/2024    RDW 14.0 05/31/2022    RDW 15.5 (H) 06/13/2021    RDW 15.4 (H) 06/12/2021     04/05/2024     05/31/2022     06/13/2021     06/12/2021       Hepatic Function Panel:    Lab Results   Component Value Date    ALKPHOS 91 04/05/2024    ALT 15 04/05/2024    AST 15 04/05/2024    PROT 6.7 04/05/2024    BILITOT 0.4 04/05/2024       HgBA1c:    Lab Results   Component Value Date    HGBA1C 11 (H) 04/12/2025       Magnesium:    Lab Results   Component Value Date    MG 1.90 06/13/2021       TSH:    Lab Results   Component Value Date    TSH 3.22 04/20/2021       BNP:   Lab Results   Component Value Date     (H) 06/08/2021        Problem List:     Patient Active Problem List   Diagnosis    Acute sinusitis    Allergic rhinitis    Range    Anxiety    BPPV (benign paroxysmal positional vertigo)    CAD S/P percutaneous coronary angioplasty    Chronic back pain    Chronic diastolic heart failure, NYHA class 1    Chronic obstructive pulmonary disease (Multi)    Chronic venous insufficiency    Stage 3b chronic kidney disease (Multi)    Depression    Diabetic peripheral neuropathy (Multi)    DM type 2 with diabetic mixed hyperlipidemia (Multi)    Essential hypertension    GERD (gastroesophageal reflux disease)    Hyperuricemia    Hypotension    IBS (irritable bowel syndrome)    Mixed hyperlipidemia    Neck pain    Nephrolithiasis    Peripheral polyneuropathy    Pleural effusion    Renal insufficiency    Sensory disturbance    Thyroid nodule    TIA (transient ischemic attack)    Vertebral artery narrowing    Vertebrobasilar insufficiency    Vitamin D deficiency    Otitis externa of left ear    Shortness of breath    BMI 35.0-35.9,adult    Atherosclerosis of native coronary artery of native heart with angina pectoris    History of PTCA    Class 1  obesity with body mass index (BMI) of 34.0 to 34.9 in adult    Former smoker       Asessment:     Problem List Items Addressed This Visit           ICD-10-CM    Chronic diastolic heart failure, NYHA class 1 I50.32    Relevant Orders    Follow Up In Cardiology    Essential hypertension I10    Relevant Orders    Follow Up In Cardiology    CBC    Comprehensive Metabolic Panel    Mixed hyperlipidemia E78.2    Relevant Orders    Follow Up In Cardiology    CBC    Comprehensive Metabolic Panel    Lipid Panel    Atherosclerosis of native coronary artery of native heart with angina pectoris I25.119    Relevant Orders    Follow Up In Cardiology    History of PTCA - Primary Z98.61    Relevant Orders    Follow Up In Cardiology       Scribe Attestation  By signing my name below, I, Noemi Lucas RN  , Scribe   attest that this documentation has been prepared under the direction and in the presence of Flavio Cox MD.    The above portion of this note was dictated by me using voice recognition software. I personally performed the services described in the documentation. The scribe entering the documentation below was in my presence. I affirm that the information is both accurate and complete.

## 2025-07-01 DIAGNOSIS — R80.8 OTHER PROTEINURIA: ICD-10-CM

## 2025-07-01 DIAGNOSIS — N18.32 STAGE 3B CHRONIC KIDNEY DISEASE (MULTI): ICD-10-CM

## 2025-07-01 DIAGNOSIS — E08.22 DIABETES MELLITUS DUE TO UNDERLYING CONDITION WITH DIABETIC CHRONIC KIDNEY DISEASE, UNSPECIFIED CKD STAGE, UNSPECIFIED WHETHER LONG TERM INSULIN USE: ICD-10-CM

## 2025-07-01 DIAGNOSIS — I10 ESSENTIAL HYPERTENSION: ICD-10-CM

## 2025-07-01 RX ORDER — LISINOPRIL 40 MG/1
40 TABLET ORAL DAILY
Qty: 90 TABLET | Refills: 3 | Status: SHIPPED | OUTPATIENT
Start: 2025-07-01 | End: 2026-07-01

## 2025-07-01 NOTE — TELEPHONE ENCOUNTER
Pt lvm requesting refill for lisinopril 40mg to be sent to [facility] Guillen 10/31/2024  Pov none scheduled    Thank you!   none

## 2025-07-03 ENCOUNTER — TRANSCRIBE ORDERS (OUTPATIENT)
Dept: WOMENS IMAGING | Age: 70
End: 2025-07-03

## 2025-07-03 ENCOUNTER — OFFICE VISIT (OUTPATIENT)
Age: 70
End: 2025-07-03
Payer: MEDICARE

## 2025-07-03 ENCOUNTER — HOSPITAL ENCOUNTER (OUTPATIENT)
Dept: WOMENS IMAGING | Age: 70
Discharge: HOME | End: 2025-07-05
Payer: MEDICARE

## 2025-07-03 VITALS
WEIGHT: 205 LBS | BODY MASS INDEX: 34.16 KG/M2 | HEIGHT: 65 IN | SYSTOLIC BLOOD PRESSURE: 127 MMHG | DIASTOLIC BLOOD PRESSURE: 74 MMHG | HEART RATE: 71 BPM

## 2025-07-03 DIAGNOSIS — E04.2 MULTINODULAR THYROID: ICD-10-CM

## 2025-07-03 DIAGNOSIS — Z12.31 SCREENING MAMMOGRAM FOR BREAST CANCER: ICD-10-CM

## 2025-07-03 DIAGNOSIS — E11.65 TYPE 2 DIABETES MELLITUS WITH HYPERGLYCEMIA, WITH LONG-TERM CURRENT USE OF INSULIN (HCC): Primary | ICD-10-CM

## 2025-07-03 DIAGNOSIS — Z12.31 SCREENING MAMMOGRAM FOR BREAST CANCER: Primary | ICD-10-CM

## 2025-07-03 DIAGNOSIS — Z79.4 TYPE 2 DIABETES MELLITUS WITH HYPERGLYCEMIA, WITH LONG-TERM CURRENT USE OF INSULIN (HCC): Primary | ICD-10-CM

## 2025-07-03 LAB
CHP ED QC CHECK: NORMAL
GLUCOSE BLD-MCNC: 211 MG/DL

## 2025-07-03 PROCEDURE — 95251 CONT GLUC MNTR ANALYSIS I&R: CPT | Performed by: PHYSICIAN ASSISTANT

## 2025-07-03 PROCEDURE — 82962 GLUCOSE BLOOD TEST: CPT | Performed by: PHYSICIAN ASSISTANT

## 2025-07-03 PROCEDURE — 1159F MED LIST DOCD IN RCRD: CPT | Performed by: PHYSICIAN ASSISTANT

## 2025-07-03 PROCEDURE — 77063 BREAST TOMOSYNTHESIS BI: CPT

## 2025-07-03 PROCEDURE — 1126F AMNT PAIN NOTED NONE PRSNT: CPT | Performed by: PHYSICIAN ASSISTANT

## 2025-07-03 PROCEDURE — 3046F HEMOGLOBIN A1C LEVEL >9.0%: CPT | Performed by: PHYSICIAN ASSISTANT

## 2025-07-03 PROCEDURE — 99214 OFFICE O/P EST MOD 30 MIN: CPT | Performed by: PHYSICIAN ASSISTANT

## 2025-07-03 PROCEDURE — 1123F ACP DISCUSS/DSCN MKR DOCD: CPT | Performed by: PHYSICIAN ASSISTANT

## 2025-07-03 ASSESSMENT — ENCOUNTER SYMPTOMS
ABDOMINAL PAIN: 0
SORE THROAT: 0
VOMITING: 0
COUGH: 0
RHINORRHEA: 0
SINUS PRESSURE: 0
WHEEZING: 0
SHORTNESS OF BREATH: 0
NAUSEA: 0
DIARRHEA: 0
BLURRED VISION: 1

## 2025-07-03 NOTE — PROGRESS NOTES
morning and at bedtime, Disp: 150 g, Rfl: 0    allopurinol (ZYLOPRIM) 100 MG tablet, , Disp: , Rfl:     ALPRAZolam (XANAX) 0.5 MG tablet, Take by mouth., Disp: , Rfl:     calcium carb-cholecalciferol 600-10 MG-MCG TABS per tab, , Disp: , Rfl:     Calcium Carbonate-Vitamin D 600-5 MG-MCG TABS, Take by mouth, Disp: , Rfl:     clopidogrel (PLAVIX) 75 MG tablet, Take 1 tablet by mouth daily, Disp: , Rfl:     dicyclomine (BENTYL) 20 MG tablet, Take by mouth, Disp: , Rfl:     fluticasone-salmeterol (ADVAIR) 500-50 MCG/ACT AEPB diskus inhaler, Inhale 1 puff into the lungs 2 times daily, Disp: , Rfl:     gabapentin (NEURONTIN) 300 MG capsule, TAKE 2 CAPSULES BY MOUTH 3 TIMES A DAY, Disp: , Rfl:   Lab Results   Component Value Date     06/26/2025    K 4.8 06/26/2025    CL 95 06/26/2025    CO2 28 06/26/2025    BUN 38 (H) 06/26/2025    CREATININE 1.57 (H) 06/26/2025    GLUCOSE 314 (H) 06/26/2025    CALCIUM 9.3 06/26/2025    BILITOT <0.2 06/26/2025    ALKPHOS 92 06/26/2025    AST 10 06/26/2025    ALT 10 06/26/2025    LABGLOM 35.5 (L) 06/26/2025    GFRAA 37.7 (L) 06/29/2022    GLOB 3.0 06/26/2025     Lab Results   Component Value Date    WBC 8.2 11/30/2024    HGB 11.3 (L) 11/30/2024    HCT 33.4 (L) 11/30/2024    MCV 84.8 11/30/2024     11/30/2024     Lab Results   Component Value Date    LABA1C 13.2 (H) 06/26/2025    LABA1C 11.0 (H) 04/12/2025    LABA1C 13.5 01/09/2025      Latest Reference Range & Units 09/17/24 13:38   Creatinine, Ur Not Established mg/dL 91.1   Microalb/Creat Ratio POC 0.0 - 30.0 mg/G see below   Microalb, Ur Not Established mg/dL <1.20     Lab Results   Component Value Date    HDL 31 (L) 09/17/2024    HDL 31 (L) 01/06/2023    HDL 30 (L) 12/11/2021    CHOL 160 09/17/2024    CHOL 138 01/06/2023    CHOL 172 12/11/2021    TRIG 282 (H) 09/17/2024    TRIG 244 (H) 01/06/2023    TRIG 246 (H) 12/11/2021     Lab Results   Component Value Date    TESTOSTERONE 8 11/30/2024    SHBG 20 11/30/2024

## 2025-07-08 ENCOUNTER — OFFICE VISIT (OUTPATIENT)
Age: 70
End: 2025-07-08
Payer: MEDICARE

## 2025-07-08 VITALS
DIASTOLIC BLOOD PRESSURE: 60 MMHG | WEIGHT: 205 LBS | BODY MASS INDEX: 34.16 KG/M2 | TEMPERATURE: 97.3 F | SYSTOLIC BLOOD PRESSURE: 100 MMHG | HEIGHT: 65 IN

## 2025-07-08 DIAGNOSIS — M54.50 CHRONIC LOW BACK PAIN, UNSPECIFIED BACK PAIN LATERALITY, UNSPECIFIED WHETHER SCIATICA PRESENT: ICD-10-CM

## 2025-07-08 DIAGNOSIS — G89.4 CHRONIC PAIN SYNDROME: ICD-10-CM

## 2025-07-08 DIAGNOSIS — M54.12 CERVICAL RADICULOPATHY: ICD-10-CM

## 2025-07-08 DIAGNOSIS — M47.812 CERVICAL SPONDYLOSIS WITHOUT MYELOPATHY: ICD-10-CM

## 2025-07-08 DIAGNOSIS — M47.812 CERVICAL SPONDYLOSIS: ICD-10-CM

## 2025-07-08 DIAGNOSIS — G89.29 CHRONIC LOW BACK PAIN, UNSPECIFIED BACK PAIN LATERALITY, UNSPECIFIED WHETHER SCIATICA PRESENT: ICD-10-CM

## 2025-07-08 PROCEDURE — 99214 OFFICE O/P EST MOD 30 MIN: CPT | Performed by: NURSE PRACTITIONER

## 2025-07-08 PROCEDURE — 1159F MED LIST DOCD IN RCRD: CPT | Performed by: NURSE PRACTITIONER

## 2025-07-08 PROCEDURE — 1160F RVW MEDS BY RX/DR IN RCRD: CPT | Performed by: NURSE PRACTITIONER

## 2025-07-08 PROCEDURE — 1125F AMNT PAIN NOTED PAIN PRSNT: CPT | Performed by: NURSE PRACTITIONER

## 2025-07-08 PROCEDURE — G2211 COMPLEX E/M VISIT ADD ON: HCPCS | Performed by: NURSE PRACTITIONER

## 2025-07-08 PROCEDURE — 1123F ACP DISCUSS/DSCN MKR DOCD: CPT | Performed by: NURSE PRACTITIONER

## 2025-07-08 RX ORDER — TRAMADOL HYDROCHLORIDE 50 MG/1
50 TABLET ORAL 2 TIMES DAILY PRN
Qty: 60 TABLET | Refills: 0 | Status: SHIPPED | OUTPATIENT
Start: 2025-07-10 | End: 2025-08-09

## 2025-07-08 ASSESSMENT — ENCOUNTER SYMPTOMS
SORE THROAT: 0
ABDOMINAL PAIN: 0
SHORTNESS OF BREATH: 0

## 2025-07-08 NOTE — PROGRESS NOTES
Graciela Reddy  (1955)    7/8/2025    Subjective:     Graciela Reddy is 69 y.o. female who complains today of:    Chief Complaint   Patient presents with    Back Pain    Follow-up    Neck Pain         Allergies:  Atorvastatin, Erythromycin, Hydroxychloroquine, Penicillins, Amlodipine besylate, and Metformin and related    History reviewed. No pertinent past medical history.  History reviewed. No pertinent surgical history.  Family History   Problem Relation Age of Onset    Diabetes Father     Diabetes Maternal Grandmother      Social History     Socioeconomic History    Marital status:      Spouse name: Not on file    Number of children: Not on file    Years of education: Not on file    Highest education level: Not on file   Occupational History    Not on file   Tobacco Use    Smoking status: Never    Smokeless tobacco: Never   Vaping Use    Vaping status: Never Used   Substance and Sexual Activity    Alcohol use: Never    Drug use: Never    Sexual activity: Not on file   Other Topics Concern    Not on file   Social History Narrative    Not on file     Social Drivers of Health     Financial Resource Strain: Not on file   Food Insecurity: Not on file   Transportation Needs: Not on file   Physical Activity: Not on file   Stress: Not on file   Social Connections: Not on file   Intimate Partner Violence: Not on file   Housing Stability: Not on file       Current Outpatient Medications on File Prior to Visit   Medication Sig Dispense Refill    Tirzepatide (MOUNJARO) 2.5 MG/0.5ML SOAJ pen Inject 2.5 mg into the skin every 7 days 2 mL 5    pioglitazone (ACTOS) 45 MG tablet Take 1 tablet by mouth daily 30 tablet 3    insulin regular human (HUMULIN R U-500 KWIKPEN) 500 UNIT/ML SOPN concentrated injection pen Inject 450 units before breakfast, 400 units before lunch, 450 units before dinner 60 mL 5    Insulin Pen Needle 32G X 6 MM MISC 1 Device by Does not apply route 5 times daily 150 each 3

## 2025-07-11 DIAGNOSIS — M54.9 DORSALGIA, UNSPECIFIED: ICD-10-CM

## 2025-07-11 DIAGNOSIS — G89.29 OTHER CHRONIC PAIN: ICD-10-CM

## 2025-07-12 RX ORDER — GABAPENTIN 300 MG/1
600 CAPSULE ORAL 3 TIMES DAILY
Qty: 540 CAPSULE | Refills: 0 | Status: SHIPPED | OUTPATIENT
Start: 2025-07-12

## 2025-07-15 ENCOUNTER — TELEPHONE (OUTPATIENT)
Age: 70
End: 2025-07-15

## 2025-07-15 RX ORDER — INSULIN LISPRO 100 [IU]/ML
INJECTION, SOLUTION INTRAVENOUS; SUBCUTANEOUS
Qty: 15 ML | Refills: 5 | Status: SHIPPED | OUTPATIENT
Start: 2025-07-15

## 2025-07-15 RX ORDER — INSULIN GLARGINE 100 [IU]/ML
20 INJECTION, SOLUTION SUBCUTANEOUS NIGHTLY
Qty: 15 ML | Refills: 3 | Status: SHIPPED | OUTPATIENT
Start: 2025-07-15

## 2025-07-15 NOTE — PROGRESS NOTES
Patient called today stating that she had a hypoglycemic event this morning, it went down to 47.  She did not fall, lowered herself to the floor, family members or with her called 911, they did not use the G[employer] Du rescue pen as they were instructed previously.  The squad resuscitated her glucoses up in the 100s she was not taken to the hospital.  I have discontinued Humulin U-500, started her on Lantus 20 units nightly and Humalog on a sliding scale.  Will review her CGM and make adjustments at her follow-up appointment in 3 weeks

## 2025-07-15 NOTE — TELEPHONE ENCOUNTER
Per pt, she was advised to take 20 units of 500 insulin before every meal, and she is having an issue with her sugar. It's \"bottoming out!\" Paramedics had to be called today, since it went down to 47. It is currently at 107. She didn't go to the hospital since they gave her fluid IV. Please advise.     Pt: [phone]

## 2025-07-18 ENCOUNTER — TELEPHONE (OUTPATIENT)
Age: 70
End: 2025-07-18

## 2025-07-18 NOTE — TELEPHONE ENCOUNTER
I spoke to Graciela and we reviewed her sliding scale together. I advised her that if he sugars go over 301+ that she needs to take 11 units of Humalog and call Ed.

## 2025-07-18 NOTE — TELEPHONE ENCOUNTER
Graciela called in she said she has no directions for her new medication Lispro and she doesn't know how much to take before each meal    Please advise

## 2025-08-04 ENCOUNTER — HOSPITAL ENCOUNTER (OUTPATIENT)
Dept: MRI IMAGING | Age: 70
Discharge: HOME | End: 2025-08-06
Payer: MEDICARE

## 2025-08-04 DIAGNOSIS — R29.6 FALLS: ICD-10-CM

## 2025-08-04 DIAGNOSIS — M47.817 LUMBOSACRAL SPONDYLOSIS WITHOUT MYELOPATHY: ICD-10-CM

## 2025-08-04 DIAGNOSIS — M54.16 LUMBAR RADICULITIS: ICD-10-CM

## 2025-08-04 PROCEDURE — 72148 MRI LUMBAR SPINE W/O DYE: CPT

## 2025-08-07 ENCOUNTER — OFFICE VISIT (OUTPATIENT)
Age: 70
End: 2025-08-07
Payer: MEDICARE

## 2025-08-07 VITALS
HEART RATE: 89 BPM | HEIGHT: 65 IN | BODY MASS INDEX: 34.16 KG/M2 | OXYGEN SATURATION: 98 % | TEMPERATURE: 97 F | DIASTOLIC BLOOD PRESSURE: 68 MMHG | WEIGHT: 205 LBS | SYSTOLIC BLOOD PRESSURE: 118 MMHG

## 2025-08-07 DIAGNOSIS — M47.812 CERVICAL SPONDYLOSIS WITHOUT MYELOPATHY: ICD-10-CM

## 2025-08-07 DIAGNOSIS — G89.4 CHRONIC PAIN SYNDROME: ICD-10-CM

## 2025-08-07 DIAGNOSIS — M47.812 CERVICAL SPONDYLOSIS: ICD-10-CM

## 2025-08-07 DIAGNOSIS — M54.50 CHRONIC LOW BACK PAIN, UNSPECIFIED BACK PAIN LATERALITY, UNSPECIFIED WHETHER SCIATICA PRESENT: ICD-10-CM

## 2025-08-07 DIAGNOSIS — M46.1 SACROILIITIS: Primary | ICD-10-CM

## 2025-08-07 DIAGNOSIS — G89.29 CHRONIC LOW BACK PAIN, UNSPECIFIED BACK PAIN LATERALITY, UNSPECIFIED WHETHER SCIATICA PRESENT: ICD-10-CM

## 2025-08-07 DIAGNOSIS — M54.12 CERVICAL RADICULOPATHY: ICD-10-CM

## 2025-08-07 PROCEDURE — 99214 OFFICE O/P EST MOD 30 MIN: CPT | Performed by: NURSE PRACTITIONER

## 2025-08-07 PROCEDURE — G2211 COMPLEX E/M VISIT ADD ON: HCPCS | Performed by: NURSE PRACTITIONER

## 2025-08-07 PROCEDURE — 1160F RVW MEDS BY RX/DR IN RCRD: CPT | Performed by: NURSE PRACTITIONER

## 2025-08-07 PROCEDURE — 1123F ACP DISCUSS/DSCN MKR DOCD: CPT | Performed by: NURSE PRACTITIONER

## 2025-08-07 PROCEDURE — 1159F MED LIST DOCD IN RCRD: CPT | Performed by: NURSE PRACTITIONER

## 2025-08-07 PROCEDURE — 1125F AMNT PAIN NOTED PAIN PRSNT: CPT | Performed by: NURSE PRACTITIONER

## 2025-08-07 RX ORDER — TRAMADOL HYDROCHLORIDE 50 MG/1
50 TABLET ORAL 2 TIMES DAILY PRN
Qty: 60 TABLET | Refills: 0 | Status: SHIPPED | OUTPATIENT
Start: 2025-08-07 | End: 2025-09-06

## 2025-08-07 ASSESSMENT — ENCOUNTER SYMPTOMS
ABDOMINAL PAIN: 0
SORE THROAT: 0
SHORTNESS OF BREATH: 0

## 2025-08-08 ENCOUNTER — HOSPITAL ENCOUNTER (OUTPATIENT)
Dept: ULTRASOUND IMAGING | Age: 70
Discharge: HOME | End: 2025-08-10
Payer: MEDICARE

## 2025-08-08 DIAGNOSIS — E04.2 MULTINODULAR THYROID: ICD-10-CM

## 2025-08-08 PROCEDURE — 76536 US EXAM OF HEAD AND NECK: CPT

## 2025-08-12 ENCOUNTER — TELEPHONE (OUTPATIENT)
Age: 70
End: 2025-08-12

## 2025-08-17 DIAGNOSIS — Z98.61 CORONARY ANGIOPLASTY STATUS: ICD-10-CM

## 2025-08-17 DIAGNOSIS — I25.10 ATHEROSCLEROTIC HEART DISEASE OF NATIVE CORONARY ARTERY WITHOUT ANGINA PECTORIS: ICD-10-CM

## 2025-08-17 DIAGNOSIS — G45.0 VERTEBRO-BASILAR ARTERY SYNDROME: ICD-10-CM

## 2025-08-18 ENCOUNTER — HOSPITAL ENCOUNTER (OUTPATIENT)
Dept: GENERAL RADIOLOGY | Age: 70
Discharge: HOME | End: 2025-08-20
Payer: MEDICARE

## 2025-08-18 ENCOUNTER — HOSPITAL ENCOUNTER (OUTPATIENT)
Age: 70
Discharge: HOME | End: 2025-08-18
Payer: MEDICARE

## 2025-08-18 VITALS
OXYGEN SATURATION: 99 % | DIASTOLIC BLOOD PRESSURE: 70 MMHG | HEIGHT: 65 IN | BODY MASS INDEX: 34.16 KG/M2 | TEMPERATURE: 97.2 F | HEART RATE: 84 BPM | WEIGHT: 205 LBS | SYSTOLIC BLOOD PRESSURE: 124 MMHG

## 2025-08-18 DIAGNOSIS — R52 PAIN: ICD-10-CM

## 2025-08-18 DIAGNOSIS — M46.1 SACROILIITIS: ICD-10-CM

## 2025-08-18 DIAGNOSIS — J44.89 ASTHMA WITH CHRONIC OBSTRUCTIVE PULMONARY DISEASE (COPD) (HCC): ICD-10-CM

## 2025-08-18 PROCEDURE — 27096 INJECT SACROILIAC JOINT: CPT | Performed by: PAIN MEDICINE

## 2025-08-18 PROCEDURE — 6360000002 HC RX W HCPCS: Performed by: PAIN MEDICINE

## 2025-08-18 PROCEDURE — 71046 X-RAY EXAM CHEST 2 VIEWS: CPT

## 2025-08-18 PROCEDURE — 27096 INJECT SACROILIAC JOINT: CPT

## 2025-08-18 RX ORDER — BETAMETHASONE SODIUM PHOSPHATE AND BETAMETHASONE ACETATE 3; 3 MG/ML; MG/ML
6 INJECTION, SUSPENSION INTRA-ARTICULAR; INTRALESIONAL; INTRAMUSCULAR; SOFT TISSUE ONCE
Status: COMPLETED | OUTPATIENT
Start: 2025-08-18 | End: 2025-08-18

## 2025-08-18 RX ORDER — LIDOCAINE HYDROCHLORIDE 10 MG/ML
2 INJECTION, SOLUTION EPIDURAL; INFILTRATION; INTRACAUDAL; PERINEURAL ONCE
Status: COMPLETED | OUTPATIENT
Start: 2025-08-18 | End: 2025-08-18

## 2025-08-18 RX ADMIN — BETAMETHASONE SODIUM PHOSPHATE AND BETAMETHASONE ACETATE 6 MG: 3; 3 INJECTION, SUSPENSION INTRA-ARTICULAR; INTRALESIONAL; INTRAMUSCULAR at 11:54

## 2025-08-18 RX ADMIN — LIDOCAINE HYDROCHLORIDE 2 ML: 10 INJECTION, SOLUTION EPIDURAL; INFILTRATION; INTRACAUDAL; PERINEURAL at 11:53

## 2025-08-18 ASSESSMENT — PAIN SCALES - GENERAL
PAINLEVEL_OUTOF10: 5
PAINLEVEL_OUTOF10: 7

## 2025-08-18 ASSESSMENT — PAIN DESCRIPTION - LOCATION
LOCATION: BACK
LOCATION: BACK

## 2025-08-20 RX ORDER — SIMVASTATIN 20 MG/1
20 TABLET, FILM COATED ORAL DAILY
Qty: 90 TABLET | Refills: 1 | Status: SHIPPED | OUTPATIENT
Start: 2025-08-20

## 2025-08-20 RX ORDER — SERTRALINE HYDROCHLORIDE 100 MG/1
150 TABLET, FILM COATED ORAL DAILY
Qty: 135 TABLET | Refills: 1 | Status: SHIPPED | OUTPATIENT
Start: 2025-08-20

## 2025-08-21 ENCOUNTER — OFFICE VISIT (OUTPATIENT)
Age: 70
End: 2025-08-21

## 2025-08-21 VITALS
BODY MASS INDEX: 35.65 KG/M2 | SYSTOLIC BLOOD PRESSURE: 149 MMHG | OXYGEN SATURATION: 96 % | HEIGHT: 65 IN | HEART RATE: 86 BPM | WEIGHT: 214 LBS | DIASTOLIC BLOOD PRESSURE: 85 MMHG

## 2025-08-21 DIAGNOSIS — Z79.4 TYPE 2 DIABETES MELLITUS WITH HYPERGLYCEMIA, WITH LONG-TERM CURRENT USE OF INSULIN (HCC): Primary | ICD-10-CM

## 2025-08-21 DIAGNOSIS — E04.2 MULTINODULAR THYROID: ICD-10-CM

## 2025-08-21 DIAGNOSIS — Z79.4 TYPE 2 DIABETES MELLITUS WITH HYPERGLYCEMIA, WITH LONG-TERM CURRENT USE OF INSULIN (HCC): ICD-10-CM

## 2025-08-21 DIAGNOSIS — E11.65 TYPE 2 DIABETES MELLITUS WITH HYPERGLYCEMIA, WITH LONG-TERM CURRENT USE OF INSULIN (HCC): Primary | ICD-10-CM

## 2025-08-21 DIAGNOSIS — E11.65 TYPE 2 DIABETES MELLITUS WITH HYPERGLYCEMIA, WITH LONG-TERM CURRENT USE OF INSULIN (HCC): ICD-10-CM

## 2025-08-21 LAB
ALBUMIN SERPL-MCNC: 4.2 G/DL (ref 3.5–4.6)
ALP SERPL-CCNC: 80 U/L (ref 40–130)
ALT SERPL-CCNC: 15 U/L (ref 0–33)
ANION GAP SERPL CALCULATED.3IONS-SCNC: 10 MEQ/L (ref 9–15)
AST SERPL-CCNC: 15 U/L (ref 0–35)
BILIRUB SERPL-MCNC: 0.3 MG/DL (ref 0.2–0.7)
BUN SERPL-MCNC: 38 MG/DL (ref 8–23)
CALCIUM SERPL-MCNC: 9.2 MG/DL (ref 8.5–9.9)
CHLORIDE SERPL-SCNC: 101 MEQ/L (ref 95–107)
CHP ED QC CHECK: NORMAL
CO2 SERPL-SCNC: 28 MEQ/L (ref 20–31)
CREAT SERPL-MCNC: 1.81 MG/DL (ref 0.5–0.9)
GLOBULIN SER CALC-MCNC: 2.9 G/DL (ref 2.3–3.5)
GLUCOSE BLD-MCNC: 136 MG/DL
GLUCOSE SERPL-MCNC: 117 MG/DL (ref 70–99)
POTASSIUM SERPL-SCNC: 4.4 MEQ/L (ref 3.4–4.9)
PROT SERPL-MCNC: 7.1 G/DL (ref 6.3–8)
SODIUM SERPL-SCNC: 139 MEQ/L (ref 135–144)

## 2025-08-21 RX ORDER — TIRZEPATIDE 5 MG/.5ML
5 INJECTION, SOLUTION SUBCUTANEOUS WEEKLY
Qty: 2 ML | Refills: 5 | Status: SHIPPED | OUTPATIENT
Start: 2025-08-21

## 2025-08-21 ASSESSMENT — ENCOUNTER SYMPTOMS
SINUS PRESSURE: 0
SHORTNESS OF BREATH: 0
VOMITING: 0
DIARRHEA: 0
RHINORRHEA: 0
BLURRED VISION: 1
SORE THROAT: 0
NAUSEA: 0
COUGH: 0
ABDOMINAL PAIN: 0
WHEEZING: 0

## 2025-09-04 ENCOUNTER — OFFICE VISIT (OUTPATIENT)
Age: 70
End: 2025-09-04
Payer: MEDICARE

## 2025-09-04 VITALS
WEIGHT: 214 LBS | BODY MASS INDEX: 35.65 KG/M2 | HEIGHT: 65 IN | DIASTOLIC BLOOD PRESSURE: 68 MMHG | OXYGEN SATURATION: 99 % | TEMPERATURE: 97.3 F | SYSTOLIC BLOOD PRESSURE: 128 MMHG | HEART RATE: 106 BPM

## 2025-09-04 DIAGNOSIS — M54.12 CERVICAL RADICULOPATHY: ICD-10-CM

## 2025-09-04 DIAGNOSIS — M47.812 CERVICAL SPONDYLOSIS WITHOUT MYELOPATHY: ICD-10-CM

## 2025-09-04 DIAGNOSIS — G89.4 CHRONIC PAIN SYNDROME: ICD-10-CM

## 2025-09-04 DIAGNOSIS — M47.812 CERVICAL SPONDYLOSIS: ICD-10-CM

## 2025-09-04 DIAGNOSIS — G89.29 CHRONIC LOW BACK PAIN, UNSPECIFIED BACK PAIN LATERALITY, UNSPECIFIED WHETHER SCIATICA PRESENT: ICD-10-CM

## 2025-09-04 DIAGNOSIS — M54.50 CHRONIC LOW BACK PAIN, UNSPECIFIED BACK PAIN LATERALITY, UNSPECIFIED WHETHER SCIATICA PRESENT: ICD-10-CM

## 2025-09-04 PROCEDURE — 99214 OFFICE O/P EST MOD 30 MIN: CPT | Performed by: NURSE PRACTITIONER

## 2025-09-04 PROCEDURE — 1160F RVW MEDS BY RX/DR IN RCRD: CPT | Performed by: NURSE PRACTITIONER

## 2025-09-04 PROCEDURE — 1125F AMNT PAIN NOTED PAIN PRSNT: CPT | Performed by: NURSE PRACTITIONER

## 2025-09-04 PROCEDURE — G2211 COMPLEX E/M VISIT ADD ON: HCPCS | Performed by: NURSE PRACTITIONER

## 2025-09-04 PROCEDURE — 1123F ACP DISCUSS/DSCN MKR DOCD: CPT | Performed by: NURSE PRACTITIONER

## 2025-09-04 PROCEDURE — 1159F MED LIST DOCD IN RCRD: CPT | Performed by: NURSE PRACTITIONER

## 2025-09-04 RX ORDER — TRAMADOL HYDROCHLORIDE 50 MG/1
50 TABLET ORAL 2 TIMES DAILY PRN
Qty: 60 TABLET | Refills: 0 | Status: SHIPPED | OUTPATIENT
Start: 2025-09-06 | End: 2025-10-06

## 2025-09-04 ASSESSMENT — ENCOUNTER SYMPTOMS
SHORTNESS OF BREATH: 0
SORE THROAT: 0
ABDOMINAL PAIN: 0

## 2025-09-05 ENCOUNTER — OFFICE VISIT (OUTPATIENT)
Age: 70
End: 2025-09-05
Payer: MEDICARE

## 2025-09-05 VITALS
OXYGEN SATURATION: 97 % | BODY MASS INDEX: 35.65 KG/M2 | HEIGHT: 65 IN | SYSTOLIC BLOOD PRESSURE: 111 MMHG | WEIGHT: 214 LBS | DIASTOLIC BLOOD PRESSURE: 69 MMHG | HEART RATE: 90 BPM | RESPIRATION RATE: 17 BRPM

## 2025-09-05 DIAGNOSIS — Z01.812 PRE-OPERATIVE LABORATORY EXAMINATION: ICD-10-CM

## 2025-09-05 DIAGNOSIS — E04.2 MULTINODULAR THYROID: Primary | ICD-10-CM

## 2025-09-05 DIAGNOSIS — Z86.79 HISTORY OF HYPERTENSION: ICD-10-CM

## 2025-09-05 PROCEDURE — 1159F MED LIST DOCD IN RCRD: CPT

## 2025-09-05 PROCEDURE — 1123F ACP DISCUSS/DSCN MKR DOCD: CPT

## 2025-09-05 PROCEDURE — 99204 OFFICE O/P NEW MOD 45 MIN: CPT

## 2025-12-17 ENCOUNTER — APPOINTMENT (OUTPATIENT)
Dept: PRIMARY CARE | Facility: CLINIC | Age: 70
End: 2025-12-17
Payer: MEDICARE

## 2025-12-22 ENCOUNTER — APPOINTMENT (OUTPATIENT)
Dept: CARDIOLOGY | Facility: CLINIC | Age: 70
End: 2025-12-22
Payer: MEDICARE